# Patient Record
Sex: FEMALE | Race: ASIAN
[De-identification: names, ages, dates, MRNs, and addresses within clinical notes are randomized per-mention and may not be internally consistent; named-entity substitution may affect disease eponyms.]

---

## 2020-04-10 ENCOUNTER — HOSPITAL ENCOUNTER (INPATIENT)
Dept: HOSPITAL 72 - EMR | Age: 65
LOS: 13 days | Discharge: NURSING, REHAB OR LONG-TERM CARE | DRG: 871 | End: 2020-04-23
Payer: COMMERCIAL

## 2020-04-10 VITALS — DIASTOLIC BLOOD PRESSURE: 79 MMHG | SYSTOLIC BLOOD PRESSURE: 125 MMHG

## 2020-04-10 VITALS — DIASTOLIC BLOOD PRESSURE: 71 MMHG | SYSTOLIC BLOOD PRESSURE: 130 MMHG

## 2020-04-10 VITALS — SYSTOLIC BLOOD PRESSURE: 135 MMHG | DIASTOLIC BLOOD PRESSURE: 86 MMHG

## 2020-04-10 VITALS — SYSTOLIC BLOOD PRESSURE: 134 MMHG | DIASTOLIC BLOOD PRESSURE: 57 MMHG

## 2020-04-10 VITALS — WEIGHT: 131.56 LBS | BODY MASS INDEX: 22.46 KG/M2 | HEIGHT: 64 IN

## 2020-04-10 DIAGNOSIS — I62.00: ICD-10-CM

## 2020-04-10 DIAGNOSIS — A41.50: Primary | ICD-10-CM

## 2020-04-10 DIAGNOSIS — E05.80: ICD-10-CM

## 2020-04-10 DIAGNOSIS — R00.0: ICD-10-CM

## 2020-04-10 DIAGNOSIS — I21.3: ICD-10-CM

## 2020-04-10 DIAGNOSIS — I48.91: ICD-10-CM

## 2020-04-10 DIAGNOSIS — E87.6: ICD-10-CM

## 2020-04-10 DIAGNOSIS — E87.0: ICD-10-CM

## 2020-04-10 DIAGNOSIS — E11.10: ICD-10-CM

## 2020-04-10 DIAGNOSIS — E83.39: ICD-10-CM

## 2020-04-10 DIAGNOSIS — L89.156: ICD-10-CM

## 2020-04-10 DIAGNOSIS — A04.72: ICD-10-CM

## 2020-04-10 DIAGNOSIS — Z91.14: ICD-10-CM

## 2020-04-10 DIAGNOSIS — N39.0: ICD-10-CM

## 2020-04-10 DIAGNOSIS — B96.20: ICD-10-CM
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
## 2020-04-10 DIAGNOSIS — R53.1: ICD-10-CM

## 2020-04-10 DIAGNOSIS — R94.31: ICD-10-CM

## 2020-04-10 DIAGNOSIS — E03.9: ICD-10-CM

## 2020-04-10 DIAGNOSIS — I63.81: ICD-10-CM

## 2020-04-10 LAB
ADD MANUAL DIFF: NO
ALBUMIN SERPL-MCNC: 2.3 G/DL (ref 3.4–5)
ALBUMIN/GLOB SERPL: 0.5 {RATIO} (ref 1–2.7)
ALP SERPL-CCNC: 127 U/L (ref 46–116)
ALT SERPL-CCNC: 40 U/L (ref 12–78)
ANION GAP SERPL CALC-SCNC: 26 MMOL/L (ref 5–15)
APPEARANCE UR: (no result)
APTT PPP: (no result) S
AST SERPL-CCNC: 46 U/L (ref 15–37)
BASOPHILS NFR BLD AUTO: 0.8 % (ref 0–2)
BILIRUB SERPL-MCNC: 0.5 MG/DL (ref 0.2–1)
BUN SERPL-MCNC: 17 MG/DL (ref 7–18)
CALCIUM SERPL-MCNC: 9.7 MG/DL (ref 8.5–10.1)
CHLORIDE SERPL-SCNC: 117 MMOL/L (ref 98–107)
CO2 SERPL-SCNC: 11 MMOL/L (ref 21–32)
CREAT SERPL-MCNC: 0.7 MG/DL (ref 0.55–1.3)
EOSINOPHIL NFR BLD AUTO: 0 % (ref 0–3)
ERYTHROCYTE [DISTWIDTH] IN BLOOD BY AUTOMATED COUNT: 13.2 % (ref 11.6–14.8)
GLOBULIN SER-MCNC: 5.1 G/DL
GLUCOSE UR STRIP-MCNC: (no result) MG/DL
HCT VFR BLD CALC: 45.4 % (ref 37–47)
HGB BLD-MCNC: 14.9 G/DL (ref 12–16)
KETONES UR QL STRIP: (no result)
LEUKOCYTE ESTERASE UR QL STRIP: (no result)
LYMPHOCYTES NFR BLD AUTO: 7.3 % (ref 20–45)
MCV RBC AUTO: 94 FL (ref 80–99)
MONOCYTES NFR BLD AUTO: 5.3 % (ref 1–10)
NEUTROPHILS NFR BLD AUTO: 86.5 % (ref 45–75)
NITRITE UR QL STRIP: POSITIVE
PH UR STRIP: 5 [PH] (ref 4.5–8)
PLATELET # BLD: 85 K/UL (ref 150–450)
POTASSIUM SERPL-SCNC: 3.1 MMOL/L (ref 3.5–5.1)
PROT UR QL STRIP: (no result)
RBC # BLD AUTO: 4.85 M/UL (ref 4.2–5.4)
SODIUM SERPL-SCNC: 154 MMOL/L (ref 136–145)
SP GR UR STRIP: 1.01 (ref 1–1.03)
UROBILINOGEN UR-MCNC: NORMAL MG/DL (ref 0–1)
WBC # BLD AUTO: 9.5 K/UL (ref 4.8–10.8)

## 2020-04-10 PROCEDURE — 72141 MRI NECK SPINE W/O DYE: CPT

## 2020-04-10 PROCEDURE — 96366 THER/PROPH/DIAG IV INF ADDON: CPT

## 2020-04-10 PROCEDURE — 83615 LACTATE (LD) (LDH) ENZYME: CPT

## 2020-04-10 PROCEDURE — 76700 US EXAM ABDOM COMPLETE: CPT

## 2020-04-10 PROCEDURE — 83880 ASSAY OF NATRIURETIC PEPTIDE: CPT

## 2020-04-10 PROCEDURE — 83690 ASSAY OF LIPASE: CPT

## 2020-04-10 PROCEDURE — 93005 ELECTROCARDIOGRAM TRACING: CPT

## 2020-04-10 PROCEDURE — 85379 FIBRIN DEGRADATION QUANT: CPT

## 2020-04-10 PROCEDURE — 72148 MRI LUMBAR SPINE W/O DYE: CPT

## 2020-04-10 PROCEDURE — 78306 BONE IMAGING WHOLE BODY: CPT

## 2020-04-10 PROCEDURE — 87040 BLOOD CULTURE FOR BACTERIA: CPT

## 2020-04-10 PROCEDURE — 83735 ASSAY OF MAGNESIUM: CPT

## 2020-04-10 PROCEDURE — 82378 CARCINOEMBRYONIC ANTIGEN: CPT

## 2020-04-10 PROCEDURE — 82009 KETONE BODYS QUAL: CPT

## 2020-04-10 PROCEDURE — 80307 DRUG TEST PRSMV CHEM ANLYZR: CPT

## 2020-04-10 PROCEDURE — 86300 IMMUNOASSAY TUMOR CA 15-3: CPT

## 2020-04-10 PROCEDURE — 82140 ASSAY OF AMMONIA: CPT

## 2020-04-10 PROCEDURE — 96375 TX/PRO/DX INJ NEW DRUG ADDON: CPT

## 2020-04-10 PROCEDURE — 82784 ASSAY IGA/IGD/IGG/IGM EACH: CPT

## 2020-04-10 PROCEDURE — 87324 CLOSTRIDIUM AG IA: CPT

## 2020-04-10 PROCEDURE — 80048 BASIC METABOLIC PNL TOTAL CA: CPT

## 2020-04-10 PROCEDURE — 99291 CRITICAL CARE FIRST HOUR: CPT

## 2020-04-10 PROCEDURE — 36415 COLL VENOUS BLD VENIPUNCTURE: CPT

## 2020-04-10 PROCEDURE — 82607 VITAMIN B-12: CPT

## 2020-04-10 PROCEDURE — 96361 HYDRATE IV INFUSION ADD-ON: CPT

## 2020-04-10 PROCEDURE — 83036 HEMOGLOBIN GLYCOSYLATED A1C: CPT

## 2020-04-10 PROCEDURE — 82962 GLUCOSE BLOOD TEST: CPT

## 2020-04-10 PROCEDURE — 85025 COMPLETE CBC W/AUTO DIFF WBC: CPT

## 2020-04-10 PROCEDURE — 87086 URINE CULTURE/COLONY COUNT: CPT

## 2020-04-10 PROCEDURE — 84550 ASSAY OF BLOOD/URIC ACID: CPT

## 2020-04-10 PROCEDURE — 84100 ASSAY OF PHOSPHORUS: CPT

## 2020-04-10 PROCEDURE — 82977 ASSAY OF GGT: CPT

## 2020-04-10 PROCEDURE — 70553 MRI BRAIN STEM W/O & W/DYE: CPT

## 2020-04-10 PROCEDURE — 84484 ASSAY OF TROPONIN QUANT: CPT

## 2020-04-10 PROCEDURE — 71275 CT ANGIOGRAPHY CHEST: CPT

## 2020-04-10 PROCEDURE — 84165 PROTEIN E-PHORESIS SERUM: CPT

## 2020-04-10 PROCEDURE — 84300 ASSAY OF URINE SODIUM: CPT

## 2020-04-10 PROCEDURE — 84439 ASSAY OF FREE THYROXINE: CPT

## 2020-04-10 PROCEDURE — 94003 VENT MGMT INPAT SUBQ DAY: CPT

## 2020-04-10 PROCEDURE — 80202 ASSAY OF VANCOMYCIN: CPT

## 2020-04-10 PROCEDURE — 74177 CT ABD & PELVIS W/CONTRAST: CPT

## 2020-04-10 PROCEDURE — 94150 VITAL CAPACITY TEST: CPT

## 2020-04-10 PROCEDURE — 87181 SC STD AGAR DILUTION PER AGT: CPT

## 2020-04-10 PROCEDURE — 96365 THER/PROPH/DIAG IV INF INIT: CPT

## 2020-04-10 PROCEDURE — 80061 LIPID PANEL: CPT

## 2020-04-10 PROCEDURE — 72157 MRI CHEST SPINE W/O & W/DYE: CPT

## 2020-04-10 PROCEDURE — 86140 C-REACTIVE PROTEIN: CPT

## 2020-04-10 PROCEDURE — 82746 ASSAY OF FOLIC ACID SERUM: CPT

## 2020-04-10 PROCEDURE — 80299 QUANTITATIVE ASSAY DRUG: CPT

## 2020-04-10 PROCEDURE — 80053 COMPREHEN METABOLIC PANEL: CPT

## 2020-04-10 PROCEDURE — 71045 X-RAY EXAM CHEST 1 VIEW: CPT

## 2020-04-10 PROCEDURE — 71260 CT THORAX DX C+: CPT

## 2020-04-10 PROCEDURE — 81003 URINALYSIS AUTO W/O SCOPE: CPT

## 2020-04-10 PROCEDURE — 84443 ASSAY THYROID STIM HORMONE: CPT

## 2020-04-10 PROCEDURE — 85007 BL SMEAR W/DIFF WBC COUNT: CPT

## 2020-04-10 PROCEDURE — 70450 CT HEAD/BRAIN W/O DYE: CPT

## 2020-04-10 RX ADMIN — SODIUM CHLORIDE AND POTASSIUM CHLORIDE SCH MLS/HR: 4.5; 1.49 INJECTION, SOLUTION INTRAVENOUS at 23:10

## 2020-04-10 NOTE — NUR
ED Nurse Note:

IV est by SONU GARCIA aware since arrival that pt is hard stick and haven't est IV 
until 1725.

## 2020-04-10 NOTE — NUR
ED Nurse Note:



blood culture sent to lab; lab called for blood draw d/t 3 failed attempts per 
RNs.

## 2020-04-10 NOTE — EMERGENCY ROOM REPORT
History of Present Illness


General


Chief Complaint:  Abnormal Labs


Source:  Patient, Medical Record





Present Illness


HPI


Disclaimer: Please note that this report is being documented using [employer] 

technology. This can lead to erroneous entry secondary to incorrect 

interpretation by the dictating instrument.





HPI: 65-year-old female with history of diabetes on insulin presents for 

weakness and elevated blood sugar levels.  She arrives from home by EMS with 

glucose readings greater than 500 on fingerstick per EMS.  She has had 5 days 

of vomiting and diarrhea.  Unable tolerate food, water or her medications.  

Denies fever or chills.  Denies cough or chest pain.  Feels profoundly weak.  

Denies dysuria hematuria.


 


PMH: Insulin-dependent diabetes


 


PSH: Reviewed


 


Allergies: None reported


 


Social Hx: None reported


Allergies:  


Coded Allergies:  


     No Known Allergies (Unverified , 4/15/15)





COVID-19 Screening


Contact w/high risk pt:  No


Recent Travel to affected area:  No


Experienced COVID-19 symptoms?:  No





Nursing Documentation-PMH


Hx Cardiac Problems:  Yes


Hx Diabetes:  Yes





Review of Systems


All Other Systems:  negative except mentioned in HPI





Physical Exam





Vital Signs








  Date Time  Temp Pulse Resp B/P (MAP) Pulse Ox O2 Delivery O2 Flow Rate FiO2


 


4/10/20 15:28 99.1 104 20 136/78 (97) 98 Room Air  





 





General: Awake and somnolent, no acute distress, appears fatigued


HEENT: NC/AT. EOMI. dry mucous membranes


Cardiovascular: RRR.  S1 and S2 normal.  No murmur appreciated


Resp: Normal work of breathing. No cough, wheezing or crackles appreciated


Abdomen: Abdomen is soft, nondistended.  Nontender


Skin: Intact.  No abrasions, laceration or rash over the exposed skin


MSK: Normal tone and bulk. Moving all extremities.  No obvious deformity.


Neuro: Awake and alert.  Mentating appropriately.





Procedures


Critical Care Time


Critical Care Time


Total critical care time: Approximately 31 minutes





Due to a high probability of clinically significant, life threatening 

deterioration, the patient required the highest level of preparedness to 

intervene emergently and I personally spent this critical care time directly 

and personally managing the patient. This critical care time included obtaining 

a history, examining the patient, pulse oximetry, ordering and reviewing studies

, ordering treatments, evaluating response to treatment and updating management 

plan as needed, frequent reassessment and discussion with other providers as 

well as arranging for ultimate disposition.  This critical to care time was 

performed to assess and manage the high probability of life-threatening 

deterioration that could result in multiorgan failure.  This critical care time 

is separate from the separately billable procedures and treating other patients.





Medical Decision Making


Diagnostic Impression:  


 Primary Impression:  


 DKA (diabetic ketoacidoses)


 Additional Impressions:  


 Hypernatremia


 Prolonged Q-T interval on ECG


 UTI (urinary tract infection)


ER Course


65-year-old female with history of diabetes presents for evaluation of fatigue 

in the setting of recent vomiting and diarrheal illness.  Blood sugars found 

elevated prior to arrival.  Concern for DKA, HHS, electrolyte abnormality, 

dehydration, occult infection at this time.  Broad labs were sent and the 

patient was started on IV fluids and empirically treated with insulin.  She now 

shows a hypernatremia, hypokalemia and elevated anion gap acidosis with 

positive ketones consistent with diabetic ketoacidosis.  Will replete potassium 

and start IV insulin drip.  Patient require admission to intensive care.





Laboratory Tests








Test


  4/10/20


17:21 4/10/20


20:40


 


White Blood Count


  9.5 K/UL


(4.8-10.8) 


 


 


Red Blood Count


  4.85 M/UL


(4.20-5.40) 


 


 


Hemoglobin


  14.9 G/DL


(12.0-16.0) 


 


 


Hematocrit


  45.4 %


(37.0-47.0) 


 


 


Mean Corpuscular Volume 94 FL (80-99)   


 


Mean Corpuscular Hemoglobin


  30.7 PG


(27.0-31.0) 


 


 


Mean Corpuscular Hemoglobin


Concent 32.7 G/DL


(32.0-36.0) 


 


 


Red Cell Distribution Width


  13.2 %


(11.6-14.8) 


 


 


Platelet Count


  85 K/UL


(150-450)  L 


 


 


Mean Platelet Volume


  8.2 FL


(6.5-10.1) 


 


 


Neutrophils (%) (Auto)


  86.5 %


(45.0-75.0)  H 


 


 


Lymphocytes (%) (Auto)


  7.3 %


(20.0-45.0)  L 


 


 


Monocytes (%) (Auto)


  5.3 %


(1.0-10.0) 


 


 


Eosinophils (%) (Auto)


  0.0 %


(0.0-3.0) 


 


 


Basophils (%) (Auto)


  0.8 %


(0.0-2.0) 


 


 


Sodium Level


  154 MMOL/L


(136-145)  H 


 


 


Potassium Level


  3.1 MMOL/L


(3.5-5.1)  L 


 


 


Chloride Level


  117 MMOL/L


()  H 


 


 


Carbon Dioxide Level


  11 MMOL/L


(21-32)  L 


 


 


Anion Gap


  26 mmol/L


(5-15)  H 


 


 


Blood Urea Nitrogen


  17 mg/dL


(7-18) 


 


 


Creatinine


  0.7 MG/DL


(0.55-1.30) 


 


 


Estimated Glomerular


Filtration Rate > 60 mL/min


(>60) 


 


 


Glucose Level


  423 MG/DL


()  H 


 


 


Calcium Level


  9.7 MG/DL


(8.5-10.1) 


 


 


Magnesium Level


  2.5 MG/DL


(1.8-2.4)  H 


 


 


Total Bilirubin


  0.5 MG/DL


(0.2-1.0) 


 


 


Aspartate Amino Transferase


(AST) 46 U/L (15-37)


H 


 


 


Alanine Aminotransferase (ALT)


  40 U/L (12-78)


  


 


 


Alkaline Phosphatase


  127 U/L


()  H 


 


 


Total Protein


  7.4 G/DL


(6.4-8.2) 


 


 


Albumin


  2.3 G/DL


(3.4-5.0)  L 


 


 


Globulin 5.1 g/dL   


 


Albumin/Globulin Ratio


  0.5 (1.0-2.7)


L 


 


 


Lipase


  70 U/L


()  L 


 


 


Acetone Level


  Positive-moderate


(NEGATIVE) 


 


 


Urine Color  Pale yellow  


 


Urine Appearance


  


  Slightly


cloudy


 


Urine pH  5 (4.5-8.0)  


 


Urine Specific Gravity


  


  1.010


(1.005-1.035)


 


Urine Protein


  


  3+ (NEGATIVE)


H


 


Urine Glucose (UA)


  


  4+ (NEGATIVE)


H


 


Urine Ketones


  


  4+ (NEGATIVE)


H


 


Urine Blood


  


  4+ (NEGATIVE)


H


 


Urine Nitrite


  


  Positive


(NEGATIVE)  H


 


Urine Bilirubin


  


  Negative


(NEGATIVE)


 


Urine Urobilinogen


  


  Normal MG/DL


(0.0-1.0)


 


Urine Leukocyte Esterase


  


  2+ (NEGATIVE)


H


 


Urine RBC


  


  0-2 /HPF (0 -


2)


 


Urine WBC


  


  Tntc /HPF (0 -


2)  H


 


Urine Squamous Epithelial


Cells 


  None /LPF


(NONE/OCC)


 


Urine Bacteria


  


  Many /HPF


(NONE)  H








EKG Diagnostic Results


EKG Time:  16:39


Rate:  normal


Rhythm:  NSR


ST Segments:  no acute changes


Other Impression


Sinus rhythm, prolonged QTC at 679 ms, flattened T waves, anterior Q waves.  No 

ST segment changes





Rhythm Strip Diag. Results


Rhythm Strip Time:  16:39


EP Interpretation:  yes


Rate:  100


Rhythm:  NSR, no PVC's, no ectopy





CT/MRI/US Diagnostic Results


CT/MRI/US Diagnostic Results :  


   Impression


Final Report


EXAM:


CT Abdomen and Pelvis With Intravenous Contrast





CLINICAL HISTORY:


ABD DIST





TECHNIQUE:


Axial computed tomography images of the abdomen and pelvis with intravenous 

contrast. CTDI is 3.6 mGy and DLP is 193.3 mGy-cm. One or more of the following 

dose reduction techniques were used: automated exposure control, adjustment of 

the mA and/or kV according to patient size, use of iterative reconstruction 

technique.





COMPARISON:


No relevant prior studies available.





FINDINGS:


Artifacts: Motion.


Lung bases: No significant abnormality.





ABDOMEN:


Liver: No significant abnormality.


Gallbladder and bile ducts: No significant abnormality. No calcified stones.


Pancreas: No significant abnormality.


Spleen: No significant abnormality.


Adrenals: No significant abnormality.


Kidneys and ureters: Mild bilateral renal pelviectasis and prominence of 

bilateral ureters is likely related to distention of the urinary bladder. The 

lower pole of the left kidney is mildly heterogeneous.


Stomach and bowel: No significant abnormality. Bowel is nondilated.





PELVIS:


Appendix: No findings to suggest acute appendicitis.


Bladder: The distended urinary bladder appears thin-walled.


Reproductive: Unremarkable as visualized.





ABDOMEN and PELVIS:


Intraperitoneal space: No significant abnormality. No free air.


Bones/joints: No acute fracture or malalignment.


Soft tissues: No significant abnormality.


Vasculature: No significant abnormality. No abdominal aortic aneurysm.


Lymph nodes: No significant abnormality.








IMPRESSION:


1. Urinary bladder distention.


2. Mild heterogeneity of the lower pole of the left kidney may be artifactual. 

Infection is not excluded. Consider correlation with urinalysis, as clinically 

indicated. Mild bilateral renal pelviectasis and prominence of bilateral 

ureters is likely related to distention of the urinary bladder.





Radiologist: April Tony MD   Electronically Signed: 04/10/20 20:36   

Phone: [phone]


Study ready at 20:21 and initial results transmitted at 20:36


Reevaluation Time:  21:19





Last Vital Signs








  Date Time  Temp Pulse Resp B/P (MAP) Pulse Ox O2 Delivery O2 Flow Rate FiO2


 


4/10/20 15:28 99.1 104 20 136/78 (97) 98 Room Air  








Reevaluation Impression


Patient proving on insulin drip.  Urinalysis consistent with an acute urinary 

tract infection.  A CT of the abdomen was obtained showing some heterogeneity 

of the lower kidney possibly infectious etiology consistent with a 

pyelonephritis.  Will treat with ceftriaxone.  Patient will be admitted to 

panel physician group.  Admit to the stepdown unit.


Disposition:  ADMITTED AS INPATIENT


Condition:  Serious











Darryl Galloway MD Apr 10, 2020 15:39

## 2020-04-10 NOTE — DIAGNOSTIC IMAGING REPORT
EXAM:

  CT Abdomen and Pelvis With Intravenous Contrast

 

CLINICAL HISTORY:

  ABD DIST

 

TECHNIQUE:

  Axial computed tomography images of the abdomen and pelvis with 

intravenous contrast.  CTDI is 3.6 mGy and DLP is 193.3 mGy-cm.  One or 

more of the following dose reduction techniques were used: automated 

exposure control, adjustment of the mA and/or kV according to patient 

size, use of iterative reconstruction technique.

 

COMPARISON:

  No relevant prior studies available.

 

FINDINGS:

  Artifacts:  Motion.

  Lung bases:  No significant abnormality.

 

 ABDOMEN:

  Liver:  No significant abnormality.

  Gallbladder and bile ducts:  No significant abnormality.  No calcified 

stones.

  Pancreas:  No significant abnormality.

  Spleen:  No significant abnormality.

  Adrenals:  No significant abnormality.

  Kidneys and ureters:  Mild bilateral renal pelviectasis and prominence 

of bilateral ureters is likely related to distention of the urinary 

bladder. The lower pole of the left kidney is mildly heterogeneous.

  Stomach and bowel:  No significant abnormality.  Bowel is nondilated.

 

 PELVIS:

  Appendix:  No findings to suggest acute appendicitis.

  Bladder:  The distended urinary bladder appears thin-walled.

  Reproductive:  Unremarkable as visualized.

 

 ABDOMEN and PELVIS:

  Intraperitoneal space:  No significant abnormality.  No free air.

  Bones/joints:  No acute fracture or malalignment.

  Soft tissues:  No significant abnormality.

  Vasculature:  No significant abnormality.  No abdominal aortic aneurysm.

 

  Lymph nodes:  No significant abnormality.

  

 

IMPRESSION:     

1.  Urinary bladder distention.

2.  Mild heterogeneity of the lower pole of the left kidney may be 

artifactual.  Infection is not excluded.  Consider correlation with 

urinalysis, as clinically indicated.  Mild bilateral renal pelviectasis 

and prominence of bilateral ureters is likely related to distention of 

the urinary bladder.

## 2020-04-10 NOTE — NUR
ED Nurse Note:

Dr Galloway notified that KCl has to be through central line. Per MD, don't give 
KCL, but give Potassium phosp first and then novolog.

## 2020-04-10 NOTE — NUR
ED Nurse Note:



pt yelling out in pain, Pt states pain is RLQ and radiates to right flank. ERMD 
aware. ERMD at bedside

## 2020-04-11 VITALS — DIASTOLIC BLOOD PRESSURE: 62 MMHG | SYSTOLIC BLOOD PRESSURE: 104 MMHG

## 2020-04-11 VITALS — DIASTOLIC BLOOD PRESSURE: 67 MMHG | SYSTOLIC BLOOD PRESSURE: 135 MMHG

## 2020-04-11 VITALS — DIASTOLIC BLOOD PRESSURE: 65 MMHG | SYSTOLIC BLOOD PRESSURE: 134 MMHG

## 2020-04-11 LAB
ADD MANUAL DIFF: YES
ADD MANUAL DIFF: YES
ANION GAP SERPL CALC-SCNC: 17 MMOL/L (ref 5–15)
ANION GAP SERPL CALC-SCNC: 21 MMOL/L (ref 5–15)
ANION GAP SERPL CALC-SCNC: 21 MMOL/L (ref 5–15)
ANION GAP SERPL CALC-SCNC: 24 MMOL/L (ref 5–15)
BUN SERPL-MCNC: 13 MG/DL (ref 7–18)
BUN SERPL-MCNC: 14 MG/DL (ref 7–18)
CALCIUM SERPL-MCNC: 9 MG/DL (ref 8.5–10.1)
CALCIUM SERPL-MCNC: 9.1 MG/DL (ref 8.5–10.1)
CALCIUM SERPL-MCNC: 9.1 MG/DL (ref 8.5–10.1)
CALCIUM SERPL-MCNC: 9.3 MG/DL (ref 8.5–10.1)
CHLORIDE SERPL-SCNC: 121 MMOL/L (ref 98–107)
CHLORIDE SERPL-SCNC: 122 MMOL/L (ref 98–107)
CHLORIDE SERPL-SCNC: 123 MMOL/L (ref 98–107)
CHLORIDE SERPL-SCNC: 128 MMOL/L (ref 98–107)
CO2 SERPL-SCNC: 14 MMOL/L (ref 21–32)
CO2 SERPL-SCNC: 15 MMOL/L (ref 21–32)
CO2 SERPL-SCNC: 17 MMOL/L (ref 21–32)
CO2 SERPL-SCNC: 19 MMOL/L (ref 21–32)
CREAT SERPL-MCNC: 0.5 MG/DL (ref 0.55–1.3)
CREAT SERPL-MCNC: 0.6 MG/DL (ref 0.55–1.3)
CREAT SERPL-MCNC: 0.7 MG/DL (ref 0.55–1.3)
CREAT SERPL-MCNC: 0.7 MG/DL (ref 0.55–1.3)
ERYTHROCYTE [DISTWIDTH] IN BLOOD BY AUTOMATED COUNT: 11.7 % (ref 11.6–14.8)
ERYTHROCYTE [DISTWIDTH] IN BLOOD BY AUTOMATED COUNT: 12.1 % (ref 11.6–14.8)
HCT VFR BLD CALC: 42.2 % (ref 37–47)
HCT VFR BLD CALC: 46.6 % (ref 37–47)
HGB BLD-MCNC: 15 G/DL (ref 12–16)
HGB BLD-MCNC: 16.3 G/DL (ref 12–16)
MCV RBC AUTO: 88 FL (ref 80–99)
MCV RBC AUTO: 89 FL (ref 80–99)
PLATELET # BLD: 78 K/UL (ref 150–450)
PLATELET # BLD: 82 K/UL (ref 150–450)
POTASSIUM SERPL-SCNC: 2.1 MMOL/L (ref 3.5–5.1)
POTASSIUM SERPL-SCNC: 2.6 MMOL/L (ref 3.5–5.1)
POTASSIUM SERPL-SCNC: 3.1 MMOL/L (ref 3.5–5.1)
POTASSIUM SERPL-SCNC: 3.9 MMOL/L (ref 3.5–5.1)
RBC # BLD AUTO: 4.82 M/UL (ref 4.2–5.4)
RBC # BLD AUTO: 5.24 M/UL (ref 4.2–5.4)
SODIUM SERPL-SCNC: 160 MMOL/L (ref 136–145)
SODIUM SERPL-SCNC: 160 MMOL/L (ref 136–145)
SODIUM SERPL-SCNC: 161 MMOL/L (ref 136–145)
SODIUM SERPL-SCNC: 162 MMOL/L (ref 136–145)
WBC # BLD AUTO: 10.2 K/UL (ref 4.8–10.8)
WBC # BLD AUTO: 8.4 K/UL (ref 4.8–10.8)

## 2020-04-11 RX ADMIN — SODIUM CHLORIDE SCH MLS/HR: 900 INJECTION, SOLUTION INTRAVENOUS at 18:32

## 2020-04-11 RX ADMIN — INSULIN ASPART SCH UNITS: 100 INJECTION, SOLUTION INTRAVENOUS; SUBCUTANEOUS at 12:00

## 2020-04-11 RX ADMIN — PANTOPRAZOLE SODIUM SCH MG: 40 INJECTION, POWDER, FOR SOLUTION INTRAVENOUS at 12:52

## 2020-04-11 RX ADMIN — SODIUM CHLORIDE AND POTASSIUM CHLORIDE SCH MLS/HR: 4.5; 1.49 INJECTION, SOLUTION INTRAVENOUS at 05:15

## 2020-04-11 RX ADMIN — DIBASIC SODIUM PHOSPHATE, MONOBASIC POTASSIUM PHOSPHATE AND MONOBASIC SODIUM PHOSPHATE SCH MG: 852; 155; 130 TABLET ORAL at 12:51

## 2020-04-11 RX ADMIN — PANTOPRAZOLE SODIUM SCH MG: 40 INJECTION, POWDER, FOR SOLUTION INTRAVENOUS at 22:17

## 2020-04-11 RX ADMIN — SODIUM CITRATE AND CITRIC ACID MONOHYDRATE SCH ML: 500; 334 SOLUTION ORAL at 17:27

## 2020-04-11 RX ADMIN — DIBASIC SODIUM PHOSPHATE, MONOBASIC POTASSIUM PHOSPHATE AND MONOBASIC SODIUM PHOSPHATE SCH MG: 852; 155; 130 TABLET ORAL at 17:27

## 2020-04-11 RX ADMIN — METOPROLOL TARTRATE SCH MG: 25 TABLET, FILM COATED ORAL at 21:00

## 2020-04-11 RX ADMIN — LEVETIRACETAM SCH MLS/HR: 5 INJECTION INTRAVENOUS at 12:28

## 2020-04-11 RX ADMIN — INSULIN ASPART SCH UNITS: 100 INJECTION, SOLUTION INTRAVENOUS; SUBCUTANEOUS at 17:37

## 2020-04-11 RX ADMIN — LEVETIRACETAM SCH MLS/HR: 5 INJECTION INTRAVENOUS at 22:17

## 2020-04-11 RX ADMIN — INSULIN ASPART SCH UNITS: 100 INJECTION, SOLUTION INTRAVENOUS; SUBCUTANEOUS at 09:49

## 2020-04-11 RX ADMIN — SODIUM CITRATE AND CITRIC ACID MONOHYDRATE SCH ML: 500; 334 SOLUTION ORAL at 12:51

## 2020-04-11 RX ADMIN — SODIUM CHLORIDE SCH MLS/HR: 900 INJECTION, SOLUTION INTRAVENOUS at 11:44

## 2020-04-11 NOTE — DIAGNOSTIC IMAGING REPORT
EXAM:

  CT Head Without Intravenous Contrast

 

CLINICAL HISTORY:

  SUBDURAL

 

TECHNIQUE:

  Axial computed tomography images of the head/brain without intravenous 

contrast.  CTDI is 53.4 mGy and DLP is 992.1 mGy-cm.  One or more of the 

following dose reduction techniques were used: automated exposure control,

 adjustment of the mA and/or kV according to patient size, use of 

iterative reconstruction technique.

 

COMPARISON:

  CT head, 4/11/20 850

 

FINDINGS:

  Brain:  Stable small subdural hemorrhage along the left falx and left 

tentorium.  No new bleed.  Stable bilateral frontal extra-axial CSF 

density likely due to atrophy.  No edema.  No mass effect or midline 

shift.

  Ventricles:  Unremarkable.  No ventriculomegaly.

  Bones/joints:  Unremarkable.  No acute fracture.

  Soft tissues:  Unremarkable.

  Sinuses:  Unremarkable as visualized.  No acute sinusitis.

  Mastoid air cells:  Unremarkable as visualized.  No mastoid effusion.

 

IMPRESSION:     

  Stable small subdural hemorrhage along the left falx and left tentorium.

  No new bleed.  No significant interval change.

## 2020-04-11 NOTE — HISTORY AND PHYSICAL
History of Present Illness


General


Reason for Hospitalization:  Abnormal Labs





Present Illness


HPI


65-year-old female with PMH of medical noncompliance, DM type II who presents 

with b/l weakness, N/V, GLF, on admission pt found to be in DKA with 's.  

Patient is Nepali speaking, history obtained via  at bedside.  

Patient noted 5 days ago b/l LE weakness, stated she fell 3 days ago and hit 

her head. She did not go to the ER at the time and attempted to treat her HA 

with acupuncture.  Patient noted throughout this time she had multiple episodes 

of N/V x4 days, denies any F/C, changes in dietary habit, no constipation/

diarrhea. Patient states she was diagnosed with diabetes by her PCP however 

refused to take any medications as she did not believe the diagnosis was 

correct.  Patient is admitted for further treatment and evaluation.





PMH: Hypothyroidism, DMT2


FH: reviewed and not pertinent


SH: lives w/a roomate, no ETOH/tobacco


Sx: denies


Allergies:  


Coded Allergies:  


     No Known Allergies (Unverified , 4/15/15)





COVID-19 Screening


Contact w/high risk pt:  No


Recent Travel to affected area:  No


Experienced COVID-19 symptoms?:  No





Medication History


Scheduled


No Known Medications* (NKM - No Known Medications*), 0 ., (Reported)





Patient History


Healthcare decision maker





Resuscitation status


Full Code


Advanced Directive on File


No





Review of Systems


Constitutional:  Denies: no symptoms, see HPI, chills, sweats, fever, malaise, 

weakness, other


Eye:  Denies: no symptoms, see HPI, eye pain, blurred vision, tearing, double 

vision, nose pain, nose congestion, acuity changes, discharge, other


ENT:  Denies: no symptoms, see HPI, ear pain, ear discharge, nose pain, nose 

congestion, throat pain, throat swelling, mouth pain, hearing loss, nasal 

discharge, other


Respiratory:  Denies: no symptoms, see HPI, cough, orthopnea, shortness of 

breath, stridor, wheezing, HOFFMAN, sputum, other


Cardiovascular:  Denies: no symptoms, see HPI, chest pain, edema, palpitations, 

syncope, PND, other


Gastrointestinal:  Denies: no symptoms, see HPI, abdominal pain, constipation, 

diarrhea, nausea, vomiting, melena, hematemesis, other


Genitourinary:  Denies: no symptoms, see HPI, discharge, dysuria, frequency, 

hematuria, pain, retention, incontinence, urgency, vag bleed/dc, other


Musculoskeletal:  Denies: no symptoms, see HPI, back pain, gout, joint pain, 

joint swelling, muscle pain, muscle stiffness, other


Skin:  Denies: no symptoms, see HPI, rash, change in color, change in hair/nails

, dryness, lesions, other


Psychiatric:  Denies: no symptoms, see HPI, prior hx, anxiety, depressed 

feelings, emotional problems, SI, HI, hallucinations, other


Neurological:  Reports: focal weakness


Endocrine:  Denies: no symptoms, see HPI, excessive sweating, flushing, 

intolerance to temperature, increased thirst, increased urine, unexplained 

weight loss, other





Physical Exam


Physical Exam Narrative


General: NAD, A&O x 3


HEENT: NCAT, EOMi, PEERLA, mucous membranes moist


CV: RRR, no murmurs, rubs, or gallops


Pulm: CTAB, No wheezes, rhonchi, or rales, no accessory muscle usage or 

conversational dyspnea


GI: Soft, nontender, nondistended, bowel sounds present


Neuro: CN 2-12 grossly intact bilaterally, no focal signs. Sensation in tact b/l


Ext: No lower extremity edema bilaterally


Skin: no rashes lesions or ulcers


Msk: Joints symmetrical in upper extremity and lower extremity bilaterally, no 

joint swelling. MS 5/5 in UE b/l, 3/5 in LE b/l





Last 24 Hour Vital Signs








  Date Time  Temp Pulse Resp B/P (MAP) Pulse Ox O2 Delivery O2 Flow Rate FiO2


 


4/11/20 04:15  100      


 


4/11/20 04:00      Room Air  


 


4/11/20 03:40 98.6 102 26 134/65 97 Room Air  


 


4/11/20 03:20 98.6 102 26 134/65 97 Room Air  


 


4/11/20 01:30 98.6 108 26 135/67 97 Room Air  


 


4/10/20 23:57 98.6 102 16 134/57 97 Room Air  


 


4/10/20 23:06 99.1       


 


4/10/20 21:35 98.7 97 18 135/86 99 Room Air  


 


4/10/20 20:47 99.1       


 


4/10/20 19:30 99.1 85 18 125/79 99 Room Air  


 


4/10/20 17:50 99.1 90 18 130/71 99 Room Air  


 


4/10/20 15:28 99.1 104 20 136/78 (97) 98 Room Air  

















Intake and Output  


 


 4/10/20 4/11/20





 19:00 07:00


 


Intake Total 0 ml 100 ml


 


Output Total  550 ml


 


Balance 0 ml -450 ml


 


  


 


Intake Oral 0 ml 100 ml


 


Output Urine Total  550 ml











Laboratory Tests








Test


  4/10/20


17:21 4/10/20


20:40 4/11/20


01:45 4/11/20


06:10


 


White Blood Count


  9.5 K/UL


(4.8-10.8) 


  8.4 K/UL


(4.8-10.8) 


 


 


Red Blood Count


  4.85 M/UL


(4.20-5.40) 


  4.82 M/UL


(4.20-5.40) 


 


 


Hemoglobin


  14.9 G/DL


(12.0-16.0) 


  15.0 G/DL


(12.0-16.0) 


 


 


Hematocrit


  45.4 %


(37.0-47.0) 


  42.2 %


(37.0-47.0) 


 


 


Mean Corpuscular Volume 94 FL (80-99)    88 FL (80-99)   


 


Mean Corpuscular Hemoglobin


  30.7 PG


(27.0-31.0) 


  31.1 PG


(27.0-31.0)  H 


 


 


Mean Corpuscular Hemoglobin


Concent 32.7 G/DL


(32.0-36.0) 


  35.6 G/DL


(32.0-36.0) 


 


 


Red Cell Distribution Width


  13.2 %


(11.6-14.8) 


  11.7 %


(11.6-14.8) 


 


 


Platelet Count


  85 K/UL


(150-450)  L 


  82 K/UL


(150-450)  L 


 


 


Mean Platelet Volume


  8.2 FL


(6.5-10.1) 


  8.2 FL


(6.5-10.1) 


 


 


Neutrophils (%) (Auto)


  86.5 %


(45.0-75.0)  H 


  % (45.0-75.0)


  


 


 


Lymphocytes (%) (Auto)


  7.3 %


(20.0-45.0)  L 


  % (20.0-45.0)


  


 


 


Monocytes (%) (Auto)


  5.3 %


(1.0-10.0) 


   % (1.0-10.0)  


  


 


 


Eosinophils (%) (Auto)


  0.0 %


(0.0-3.0) 


   % (0.0-3.0)  


  


 


 


Basophils (%) (Auto)


  0.8 %


(0.0-2.0) 


   % (0.0-2.0)  


  


 


 


Sodium Level


  154 MMOL/L


(136-145)  H 


  160 MMOL/L


(136-145)  H 160 MMOL/L


(136-145)  H


 


Potassium Level


  3.1 MMOL/L


(3.5-5.1)  L 


  2.1 MMOL/L


(3.5-5.1)  *L 2.6 MMOL/L


(3.5-5.1)  *L


 


Chloride Level


  117 MMOL/L


()  H 


  121 MMOL/L


()  H 123 MMOL/L


()  H


 


Carbon Dioxide Level


  11 MMOL/L


(21-32)  L 


  19 MMOL/L


(21-32)  L 15 MMOL/L


(21-32)  L


 


Anion Gap


  26 mmol/L


(5-15)  H 


  21 mmol/L


(5-15)  H 21 mmol/L


(5-15)  H


 


Blood Urea Nitrogen


  17 mg/dL


(7-18) 


  14 mg/dL


(7-18) 14 mg/dL


(7-18)


 


Creatinine


  0.7 MG/DL


(0.55-1.30) 


  0.7 MG/DL


(0.55-1.30) 0.6 MG/DL


(0.55-1.30)


 


Estimat Glomerular Filtration


Rate > 60 mL/min


(>60) 


  > 60 mL/min


(>60) > 60 mL/min


(>60)


 


Glucose Level


  423 MG/DL


()  H 


  270 MG/DL


()  #H 315 MG/DL


()  H


 


Calcium Level


  9.7 MG/DL


(8.5-10.1) 


  9.1 MG/DL


(8.5-10.1) 9.0 MG/DL


(8.5-10.1)


 


Magnesium Level


  2.5 MG/DL


(1.8-2.4)  H 


  


  


 


 


Total Bilirubin


  0.5 MG/DL


(0.2-1.0) 


  


  


 


 


Aspartate Amino Transf


(AST/SGOT) 46 U/L (15-37)


H 


  


  


 


 


Alanine Aminotransferase


(ALT/SGPT) 40 U/L (12-78)


  


  


  


 


 


Alkaline Phosphatase


  127 U/L


()  H 


  


  


 


 


Total Protein


  7.4 G/DL


(6.4-8.2) 


  


  


 


 


Albumin


  2.3 G/DL


(3.4-5.0)  L 


  


  


 


 


Globulin 5.1 g/dL     


 


Albumin/Globulin Ratio


  0.5 (1.0-2.7)


L 


  


  


 


 


Lipase


  70 U/L


()  L 


  


  


 


 


Acetone Level


  Positive-moderate


(NEGATIVE) 


  


  


 


 


Urine Color  Pale yellow    


 


Urine Appearance


  


  Slightly


cloudy 


  


 


 


Urine pH  5 (4.5-8.0)    


 


Urine Specific Gravity


  


  1.010


(1.005-1.035) 


  


 


 


Urine Protein


  


  3+ (NEGATIVE)


H 


  


 


 


Urine Glucose (UA)


  


  4+ (NEGATIVE)


H 


  


 


 


Urine Ketones


  


  4+ (NEGATIVE)


H 


  


 


 


Urine Blood


  


  4+ (NEGATIVE)


H 


  


 


 


Urine Nitrite


  


  Positive


(NEGATIVE)  H 


  


 


 


Urine Bilirubin


  


  Negative


(NEGATIVE) 


  


 


 


Urine Urobilinogen


  


  Normal MG/DL


(0.0-1.0) 


  


 


 


Urine Leukocyte Esterase


  


  2+ (NEGATIVE)


H 


  


 


 


Urine RBC


  


  0-2 /HPF (0 -


2) 


  


 


 


Urine WBC


  


  Tntc /HPF (0 -


2)  H 


  


 


 


Urine Squamous Epithelial


Cells 


  None /LPF


(NONE/OCC) 


  


 


 


Urine Bacteria


  


  Many /HPF


(NONE)  H 


  


 


 


Differential Total Cells


Counted 


  


  100  


  


 


 


Neutrophils % (Manual)   82 % (45-75)  H 


 


Lymphocytes % (Manual)   5 % (20-45)  L 


 


Monocytes % (Manual)   3 % (1-10)   


 


Eosinophils % (Manual)   0 % (0-3)   


 


Basophils % (Manual)   0 % (0-2)   


 


Band Neutrophils   10 % (0-8)  H 


 


Platelet Estimate   Decreased  L 


 


Platelet Morphology   Normal   


 


Hemoglobin A1c


  


  


  10.4 %


(4.3-6.0)  H 


 


 


D-Dimer


  


  


  


  6.44 mg/L FEU


(0.00-0.49)  H


 


Phosphorus Level


  


  


  


  1.4 MG/DL


(2.5-4.9)  L








Height (Feet):  5


Height (Inches):  4.00


Weight (Pounds):  110


Medications





Current Medications








 Medications


  (Trade)  Dose


 Ordered  Sig/Meghan


 Route


 PRN Reason  Start Time


 Stop Time Status Last Admin


Dose Admin


 


 Ceftriaxone


 Sodium 1 gm/


 Sodium Chloride  55 ml @ 


 110 mls/hr  QHS


 IVPB


   4/11/20 21:00


 4/18/20 20:59   


 


 


 Dextrose


  (Dextrose 50%)  25 ml  Q30M  PRN


 IV


 Hypoglycemia  4/10/20 21:45


 7/9/20 21:44   


 


 


 Dextrose


  (Dextrose 50%)  50 ml  Q30M  PRN


 IV


 Hypoglycemia  4/10/20 21:45


 7/9/20 21:44   


 


 


 Insulin Aspart


  (NovoLOG)    Q6HR


 SUBQ


   4/11/20 09:30


 7/10/20 09:29   


 


 


 Iohexol


  (OMNIPAQUE-300


 100ml)  100 ml  NOW  PRN


 INJ


 Radiology Procedure  4/10/20 20:00


 4/12/20 19:54   


 


 


 Potassium Chloride


  (K-Dur)  40 meq  ONCE


 ORAL


   4/11/20 08:30


 4/11/20 10:00  4/11/20 09:07


 


 


 Sodium  1,000 ml @ 


 150 mls/hr  Q6H40M


 IV


   4/11/20 08:30


 5/11/20 08:29 UNV  


 











Assessment/Plan


Assessment/Plan:


65-year-old female with PMH of medical noncompliance, DM type II who presents 

with b/l weakness, N/V, GLF, on admission pt found to be in DKA with 's.





#DKA


#UTI


#Uncontrolled DM, Hgb A1C 10.4


-admit to SDU


-AG 21


-initiate Insulin ggt 


-check BMP q4 hrs


-accuchecks, ISS q6 hr


-cont. IVF hydration


-CTX given in ED


-CT abd reviewed


-BCx and UCx pending


-educated pt on diabetic compliance


-ID, Dr. Looney: CTX





#Right sided weakness


#SDH 3mm no midline shift


-found on CT head


-pt AOx3, pt noted fall 3 days ago


-keppra BID


-fall precautions


-PT treat and eval


-Neurology consulted, Dr. Cheney, recs aprpeciated





#Hypokalemia


#Hypophosphatemia


#Hypernatremia


-Hypernatremia likely 2/2 free water deficit


-replace electrolytes, cont. to monitor and replace PRN


-Nephro consulted, IVF per nephro





DVT - SCDs for now given SDH





I spent 70 minutes on this patient's case, and 50% was dedicated to counseling 

and/or care coordination witch included communication with RN, consulting MDs, 

case management 





I spent an additional 25 minutes on review of medical records including prior 

hospital records, consult notes, progress notes, procedures, imaging, labs, 

hemodynamics, and other clinical documentation.











Anitha Ochoa M.D. Apr 11, 2020 09:53

## 2020-04-11 NOTE — NUR
NURSE NOTES:

Dr. Ochoa(on call) paged for critical results of K 2.6 and some missing admission orders. New 
orders received.

## 2020-04-11 NOTE — NUR
NURSE NOTES:

Received patient from ED by alyssa . patient is awake oriented x3. No SOB with RA. Denies 
any pain or discomfort at this time. VSS. Afebrile. SR on cardiac monitor.skin body 
assessment done. intact. no open wound.given hospital orientation. call light in reach. bed 
in low position, locked and  bed alarm on. NPO now.safety measures initiated.will continue 
to monitor.

## 2020-04-11 NOTE — NEUROLOGY PROGRESS NOTE
Interim History


Repeat ct brain this am stable from prior.


cont keppra ppx


SBP < 150





Objective


Physical Exam





Last Vital Signs








  Date Time  Temp Pulse Resp B/P (MAP) Pulse Ox O2 Delivery O2 Flow Rate FiO2


 


4/11/20 16:00      Room Air  


 


4/11/20 14:00  104  113/54    


 


4/11/20 03:40 98.6  26  97   











Laboratory Tests








Test


  4/10/20


17:21 4/10/20


20:40 4/11/20


01:45 4/11/20


06:10


 


White Blood Count


  9.5 K/UL


(4.8-10.8) 


  8.4 K/UL


(4.8-10.8) 


 


 


Red Blood Count


  4.85 M/UL


(4.20-5.40) 


  4.82 M/UL


(4.20-5.40) 


 


 


Hemoglobin


  14.9 G/DL


(12.0-16.0) 


  15.0 G/DL


(12.0-16.0) 


 


 


Hematocrit


  45.4 %


(37.0-47.0) 


  42.2 %


(37.0-47.0) 


 


 


Mean Corpuscular Volume 94 FL (80-99)    88 FL (80-99)   


 


Mean Corpuscular Hemoglobin


  30.7 PG


(27.0-31.0) 


  31.1 PG


(27.0-31.0)  H 


 


 


Mean Corpuscular Hemoglobin


Concent 32.7 G/DL


(32.0-36.0) 


  35.6 G/DL


(32.0-36.0) 


 


 


Red Cell Distribution Width


  13.2 %


(11.6-14.8) 


  11.7 %


(11.6-14.8) 


 


 


Platelet Count


  85 K/UL


(150-450)  L 


  82 K/UL


(150-450)  L 


 


 


Mean Platelet Volume


  8.2 FL


(6.5-10.1) 


  8.2 FL


(6.5-10.1) 


 


 


Neutrophils (%) (Auto)


  86.5 %


(45.0-75.0)  H 


  % (45.0-75.0)


  


 


 


Lymphocytes (%) (Auto)


  7.3 %


(20.0-45.0)  L 


  % (20.0-45.0)


  


 


 


Monocytes (%) (Auto)


  5.3 %


(1.0-10.0) 


   % (1.0-10.0)  


  


 


 


Eosinophils (%) (Auto)


  0.0 %


(0.0-3.0) 


   % (0.0-3.0)  


  


 


 


Basophils (%) (Auto)


  0.8 %


(0.0-2.0) 


   % (0.0-2.0)  


  


 


 


Sodium Level


  154 MMOL/L


(136-145)  H 


  160 MMOL/L


(136-145)  H 160 MMOL/L


(136-145)  H


 


Potassium Level


  3.1 MMOL/L


(3.5-5.1)  L 


  2.1 MMOL/L


(3.5-5.1)  *L 2.6 MMOL/L


(3.5-5.1)  *L


 


Chloride Level


  117 MMOL/L


()  H 


  121 MMOL/L


()  H 123 MMOL/L


()  H


 


Carbon Dioxide Level


  11 MMOL/L


(21-32)  L 


  19 MMOL/L


(21-32)  L 15 MMOL/L


(21-32)  L


 


Anion Gap


  26 mmol/L


(5-15)  H 


  21 mmol/L


(5-15)  H 21 mmol/L


(5-15)  H


 


Blood Urea Nitrogen


  17 mg/dL


(7-18) 


  14 mg/dL


(7-18) 14 mg/dL


(7-18)


 


Creatinine


  0.7 MG/DL


(0.55-1.30) 


  0.7 MG/DL


(0.55-1.30) 0.6 MG/DL


(0.55-1.30)


 


Estimat Glomerular Filtration


Rate > 60 mL/min


(>60) 


  > 60 mL/min


(>60) > 60 mL/min


(>60)


 


Glucose Level


  423 MG/DL


()  H 


  270 MG/DL


()  #H 315 MG/DL


()  H


 


Calcium Level


  9.7 MG/DL


(8.5-10.1) 


  9.1 MG/DL


(8.5-10.1) 9.0 MG/DL


(8.5-10.1)


 


Magnesium Level


  2.5 MG/DL


(1.8-2.4)  H 


  


  


 


 


Total Bilirubin


  0.5 MG/DL


(0.2-1.0) 


  


  


 


 


Aspartate Amino Transf


(AST/SGOT) 46 U/L (15-37)


H 


  


  


 


 


Alanine Aminotransferase


(ALT/SGPT) 40 U/L (12-78)


  


  


  


 


 


Alkaline Phosphatase


  127 U/L


()  H 


  


  


 


 


Total Protein


  7.4 G/DL


(6.4-8.2) 


  


  


 


 


Albumin


  2.3 G/DL


(3.4-5.0)  L 


  


  


 


 


Globulin 5.1 g/dL     


 


Albumin/Globulin Ratio


  0.5 (1.0-2.7)


L 


  


  


 


 


Lipase


  70 U/L


()  L 


  


  


 


 


Acetone Level


  Positive-moderate


(NEGATIVE) 


  


  


 


 


Urine Color  Pale yellow    


 


Urine Appearance


  


  Slightly


cloudy 


  


 


 


Urine pH  5 (4.5-8.0)    


 


Urine Specific Gravity


  


  1.010


(1.005-1.035) 


  


 


 


Urine Protein


  


  3+ (NEGATIVE)


H 


  


 


 


Urine Glucose (UA)


  


  4+ (NEGATIVE)


H 


  


 


 


Urine Ketones


  


  4+ (NEGATIVE)


H 


  


 


 


Urine Blood


  


  4+ (NEGATIVE)


H 


  


 


 


Urine Nitrite


  


  Positive


(NEGATIVE)  H 


  


 


 


Urine Bilirubin


  


  Negative


(NEGATIVE) 


  


 


 


Urine Urobilinogen


  


  Normal MG/DL


(0.0-1.0) 


  


 


 


Urine Leukocyte Esterase


  


  2+ (NEGATIVE)


H 


  


 


 


Urine RBC


  


  0-2 /HPF (0 -


2) 


  


 


 


Urine WBC


  


  Tntc /HPF (0 -


2)  H 


  


 


 


Urine Squamous Epithelial


Cells 


  None /LPF


(NONE/OCC) 


  


 


 


Urine Bacteria


  


  Many /HPF


(NONE)  H 


  


 


 


Differential Total Cells


Counted 


  


  100  


  


 


 


Neutrophils % (Manual)   82 % (45-75)  H 


 


Lymphocytes % (Manual)   5 % (20-45)  L 


 


Monocytes % (Manual)   3 % (1-10)   


 


Eosinophils % (Manual)   0 % (0-3)   


 


Basophils % (Manual)   0 % (0-2)   


 


Band Neutrophils   10 % (0-8)  H 


 


Platelet Estimate   Decreased  L 


 


Platelet Morphology   Normal   


 


Hemoglobin A1c


  


  


  10.4 %


(4.3-6.0)  H 


 


 


D-Dimer


  


  


  


  6.44 mg/L FEU


(0.00-0.49)  H


 


Phosphorus Level


  


  


  


  1.4 MG/DL


(2.5-4.9)  L


 


Test


  4/11/20


10:26 4/11/20


13:10 4/11/20


15:30 


 


 


White Blood Count


  10.2 K/UL


(4.8-10.8) 


  


  


 


 


Red Blood Count


  5.24 M/UL


(4.20-5.40) 


  


  


 


 


Hemoglobin


  16.3 G/DL


(12.0-16.0)  H 


  


  


 


 


Hematocrit


  46.6 %


(37.0-47.0) 


  


  


 


 


Mean Corpuscular Volume 89 FL (80-99)     


 


Mean Corpuscular Hemoglobin


  31.2 PG


(27.0-31.0)  H 


  


  


 


 


Mean Corpuscular Hemoglobin


Concent 35.0 G/DL


(32.0-36.0) 


  


  


 


 


Red Cell Distribution Width


  12.1 %


(11.6-14.8) 


  


  


 


 


Platelet Count


  78 K/UL


(150-450)  L 


  


  


 


 


Mean Platelet Volume


  8.1 FL


(6.5-10.1) 


  


  


 


 


Neutrophils (%) (Auto)


  % (45.0-75.0)


  


  


  


 


 


Lymphocytes (%) (Auto)


  % (20.0-45.0)


  


  


  


 


 


Monocytes (%) (Auto)  % (1.0-10.0)     


 


Eosinophils (%) (Auto)  % (0.0-3.0)     


 


Basophils (%) (Auto)  % (0.0-2.0)     


 


Differential Total Cells


Counted 100  


  


  


  


 


 


Neutrophils % (Manual) 88 % (45-75)  H   


 


Lymphocytes % (Manual) 5 % (20-45)  L   


 


Monocytes % (Manual) 5 % (1-10)     


 


Eosinophils % (Manual) 1 % (0-3)     


 


Basophils % (Manual) 1 % (0-2)     


 


Band Neutrophils 0 % (0-8)     


 


Platelet Estimate Decreased  L   


 


Platelet Morphology Normal     


 


Red Blood Cell Morphology Normal     


 


Sodium Level


  161 MMOL/L


(136-145)  *H 


  162 MMOL/L


(136-145)  *H 


 


 


Potassium Level


  3.1 MMOL/L


(3.5-5.1)  L 


  3.9 MMOL/L


(3.5-5.1) 


 


 


Chloride Level


  122 MMOL/L


()  H 


  128 MMOL/L


()  H 


 


 


Carbon Dioxide Level


  14 MMOL/L


(21-32)  L 


  17 MMOL/L


(21-32)  L 


 


 


Anion Gap


  24 mmol/L


(5-15)  H 


  17 mmol/L


(5-15)  H 


 


 


Blood Urea Nitrogen


  14 mg/dL


(7-18) 


  13 mg/dL


(7-18) 


 


 


Creatinine


  0.7 MG/DL


(0.55-1.30) 


  0.5 MG/DL


(0.55-1.30)  L 


 


 


Estimat Glomerular Filtration


Rate > 60 mL/min


(>60) 


  > 60 mL/min


(>60) 


 


 


Glucose Level


  345 MG/DL


()  H 


  281 MG/DL


()  H 


 


 


Calcium Level


  9.3 MG/DL


(8.5-10.1) 


  9.1 MG/DL


(8.5-10.1) 


 


 


Phosphorus Level


  1.7 MG/DL


(2.5-4.9)  L 


  


  


 


 


Urine Random Sodium


  


  61 mmol/L


() 


  


 


 


Urine Opiates Screen


  


  Negative


(NEGATIVE) 


  


 


 


Urine Barbiturates Screen


  


  Negative


(NEGATIVE) 


  


 


 


Phencyclidine (PCP) Screen


  


  Negative


(NEGATIVE) 


  


 


 


Urine Amphetamines Screen


  


  Negative


(NEGATIVE) 


  


 


 


Urine Benzodiazepines Screen


  


  Negative


(NEGATIVE) 


  


 


 


Urine Cocaine Screen


  


  Negative


(NEGATIVE) 


  


 


 


Urine Marijuana (THC) Screen


  


  Negative


(NEGATIVE) 


  


 








Head:  normocophalic


Neck:  no rigidity


EENT:  benign





Neurologic Exam


Mental Status:  awake


Speech:  normal speech


Language:  normal language


Cranial Nerve VII:  no facial asymmetry


Objective


somnolent, wakes up.


Oriented  x 2


Non focal


cc 35 min





Impression/Recommendations


Problems:  


(1) STEMI (ST elevation myocardial infarction)


(2) Atrial fibrillation with rapid ventricular response


(3) Prolonged Q-T interval on ECG


(4) DKA (diabetic ketoacidoses)


(5) Hypernatremia


(6) UTI (urinary tract infection)


(7) Subdural hematoma


Diagnostic Impression


Repeat ct brain this am stable from prior.


cont keppra ppx 500 mg bid - if too sedating will lower to 250


SBP < 150


Speech eval pending


PT Osmin Stephens MD Apr 11, 2020 17:03

## 2020-04-11 NOTE — CONSULTATION
Consult Note


Consult Note


I was asked to evaluate the patient for hypernatremia


Patient in [place], Amharic speaker


History states that the patient has been noncompliant,


She has been utilizing alternative medicine to treat her diabetes as an 

outpatient





Emergency room note:





HPI: 65-year-old female with history of diabetes on insulin presents for 

weakness and elevated blood sugar levels.  She arrives from home by EMS with 

glucose readings greater than 500 on fingerstick per EMS.  She has had 5 days 

of vomiting and diarrhea.  Unable tolerate food, water or her medications.  

Denies fever or chills.  Denies cough or chest pain.  Feels profoundly weak.  

Denies dysuria hematuria.


PMH: Insulin-dependent diabetes


 


Allergies: None reported


 


Social Hx: None reported





COVID-19 Screening


Contact w/high risk pt:  No


Recent Travel to affected area:  No


Experienced COVID-19 symptoms?:  No





Hx Cardiac Problems:  Yes


Hx Diabetes:  Yes





Patient seen and examined


Has leg weakness


Has a Vincent catheter


Assessment/Plan





Hypernatremia most likely due to free water deficit


Hypokalemia, hypophosphatemia


Hyperglycemia and DKA


Evidence of UTI


Right-sided weakness with 3 mm subdural hematoma found in CT scan








Suggestion


Hypotonic IV solution


Potassium and phosphorus and magnesium supplement as needed


N.p.o. except medication


Low dose of Lopressor


Monitor renal parameters and electrolytes


Neuro eval


Keep the blood pressure and blood sugar in check


Urine studies


Per orders











Jonathan Jacobson MD Apr 11, 2020 12:16

## 2020-04-11 NOTE — NUR
ER DISCHARGE NOTE:



Patient is cleared to be discharged to SDU per ERMD, pt is aox3, on room air, 
with stable vital signs. pt was able to verbalize understanding. pt took all 
belongings. Report given to DEREK Jimenez. PT transported to unit on monitor with 
2 RNs.

## 2020-04-11 NOTE — NUR
NURSE NOTES:

Dr. Ochoa paged for change of condition with slurred speech and c/o pain in bilateral legs. 
Tylenol 650mg Q4 prn for pain/temp >100.5, Tramadol 25mg Q6 PRN for severe pain, 
Atorvastatin 40mg qhs ordered and orders read back

## 2020-04-11 NOTE — CONSULTATION
History of Present Illness


General


Date patient seen:  Apr 10, 2020


Chief Complaint:  Abnormal Labs


Reason for Consultation:  ams





Present Illness


HPI


65-year-old female with history of diabetes on insulin presents for weakness 

and elevated blood sugar levels.  She arrives from home by EMS with glucose 

readings greater than 500 on fingerstick per EMS.  She has had 5 days of 

vomiting and diarrhea.  Unable tolerate food, water or her medications.  Denies 

fever or chills.  Denies cough or chest pain.  Feels profoundly weak.  Denies 

dysuria hematuria.


 


ct brain with small SDH, incidental small subdural hemorrhage along the left 

falx and left tentorium.





PMH: Insulin-dependent diabetes


 


PSH: Reviewed


Allergies:  


Coded Allergies:  


     No Known Allergies (Unverified , 4/15/15)





Medication History


Scheduled


No Known Medications* (NKM - No Known Medications*), 0 ., (Reported)





Patient History


Healthcare decision maker





Resuscitation status


Full Code


Advanced Directive on File


No





Physical Exam


General Appearance:  lethargic


Lines, tubes and drains:  peripheral


HEENT:  normocephalic, atraumatic


Physical Exam Narrative


wakes up and follows


moves all 4


cc 35 min





Last 24 Hour Vital Signs








  Date Time  Temp Pulse Resp B/P (MAP) Pulse Ox O2 Delivery O2 Flow Rate FiO2


 


4/11/20 16:00      Room Air  


 


4/11/20 14:00  104  113/54    


 


4/11/20 12:00      Room Air  


 


4/11/20 12:00  95      


 


4/11/20 08:00      Room Air  


 


4/11/20 08:00  94      


 


4/11/20 04:15  100      


 


4/11/20 04:00      Room Air  


 


4/11/20 03:40 98.6 102 26 134/65 97 Room Air  


 


4/11/20 03:20 98.6 102 26 134/65 97 Room Air  


 


4/11/20 01:30 98.6 108 26 135/67 97 Room Air  


 


4/10/20 23:57 98.6 102 16 134/57 97 Room Air  


 


4/10/20 23:06 99.1       


 


4/10/20 21:35 98.7 97 18 135/86 99 Room Air  


 


4/10/20 20:47 99.1       


 


4/10/20 19:30 99.1 85 18 125/79 99 Room Air  


 


4/10/20 17:50 99.1 90 18 130/71 99 Room Air  

















Intake and Output  


 


 4/10/20 4/11/20





 19:00 07:00


 


Intake Total 0 ml 100 ml


 


Output Total  550 ml


 


Balance 0 ml -450 ml


 


  


 


Intake Oral 0 ml 100 ml


 


Output Urine Total  550 ml











Laboratory Tests








Test


  4/10/20


17:21 4/10/20


20:40 4/11/20


01:45 4/11/20


06:10


 


White Blood Count


  9.5 K/UL


(4.8-10.8) 


  8.4 K/UL


(4.8-10.8) 


 


 


Red Blood Count


  4.85 M/UL


(4.20-5.40) 


  4.82 M/UL


(4.20-5.40) 


 


 


Hemoglobin


  14.9 G/DL


(12.0-16.0) 


  15.0 G/DL


(12.0-16.0) 


 


 


Hematocrit


  45.4 %


(37.0-47.0) 


  42.2 %


(37.0-47.0) 


 


 


Mean Corpuscular Volume 94 FL (80-99)    88 FL (80-99)   


 


Mean Corpuscular Hemoglobin


  30.7 PG


(27.0-31.0) 


  31.1 PG


(27.0-31.0)  H 


 


 


Mean Corpuscular Hemoglobin


Concent 32.7 G/DL


(32.0-36.0) 


  35.6 G/DL


(32.0-36.0) 


 


 


Red Cell Distribution Width


  13.2 %


(11.6-14.8) 


  11.7 %


(11.6-14.8) 


 


 


Platelet Count


  85 K/UL


(150-450)  L 


  82 K/UL


(150-450)  L 


 


 


Mean Platelet Volume


  8.2 FL


(6.5-10.1) 


  8.2 FL


(6.5-10.1) 


 


 


Neutrophils (%) (Auto)


  86.5 %


(45.0-75.0)  H 


  % (45.0-75.0)


  


 


 


Lymphocytes (%) (Auto)


  7.3 %


(20.0-45.0)  L 


  % (20.0-45.0)


  


 


 


Monocytes (%) (Auto)


  5.3 %


(1.0-10.0) 


   % (1.0-10.0)  


  


 


 


Eosinophils (%) (Auto)


  0.0 %


(0.0-3.0) 


   % (0.0-3.0)  


  


 


 


Basophils (%) (Auto)


  0.8 %


(0.0-2.0) 


   % (0.0-2.0)  


  


 


 


Sodium Level


  154 MMOL/L


(136-145)  H 


  160 MMOL/L


(136-145)  H 160 MMOL/L


(136-145)  H


 


Potassium Level


  3.1 MMOL/L


(3.5-5.1)  L 


  2.1 MMOL/L


(3.5-5.1)  *L 2.6 MMOL/L


(3.5-5.1)  *L


 


Chloride Level


  117 MMOL/L


()  H 


  121 MMOL/L


()  H 123 MMOL/L


()  H


 


Carbon Dioxide Level


  11 MMOL/L


(21-32)  L 


  19 MMOL/L


(21-32)  L 15 MMOL/L


(21-32)  L


 


Anion Gap


  26 mmol/L


(5-15)  H 


  21 mmol/L


(5-15)  H 21 mmol/L


(5-15)  H


 


Blood Urea Nitrogen


  17 mg/dL


(7-18) 


  14 mg/dL


(7-18) 14 mg/dL


(7-18)


 


Creatinine


  0.7 MG/DL


(0.55-1.30) 


  0.7 MG/DL


(0.55-1.30) 0.6 MG/DL


(0.55-1.30)


 


Estimat Glomerular Filtration


Rate > 60 mL/min


(>60) 


  > 60 mL/min


(>60) > 60 mL/min


(>60)


 


Glucose Level


  423 MG/DL


()  H 


  270 MG/DL


()  #H 315 MG/DL


()  H


 


Calcium Level


  9.7 MG/DL


(8.5-10.1) 


  9.1 MG/DL


(8.5-10.1) 9.0 MG/DL


(8.5-10.1)


 


Magnesium Level


  2.5 MG/DL


(1.8-2.4)  H 


  


  


 


 


Total Bilirubin


  0.5 MG/DL


(0.2-1.0) 


  


  


 


 


Aspartate Amino Transf


(AST/SGOT) 46 U/L (15-37)


H 


  


  


 


 


Alanine Aminotransferase


(ALT/SGPT) 40 U/L (12-78)


  


  


  


 


 


Alkaline Phosphatase


  127 U/L


()  H 


  


  


 


 


Total Protein


  7.4 G/DL


(6.4-8.2) 


  


  


 


 


Albumin


  2.3 G/DL


(3.4-5.0)  L 


  


  


 


 


Globulin 5.1 g/dL     


 


Albumin/Globulin Ratio


  0.5 (1.0-2.7)


L 


  


  


 


 


Lipase


  70 U/L


()  L 


  


  


 


 


Acetone Level


  Positive-moderate


(NEGATIVE) 


  


  


 


 


Urine Color  Pale yellow    


 


Urine Appearance


  


  Slightly


cloudy 


  


 


 


Urine pH  5 (4.5-8.0)    


 


Urine Specific Gravity


  


  1.010


(1.005-1.035) 


  


 


 


Urine Protein


  


  3+ (NEGATIVE)


H 


  


 


 


Urine Glucose (UA)


  


  4+ (NEGATIVE)


H 


  


 


 


Urine Ketones


  


  4+ (NEGATIVE)


H 


  


 


 


Urine Blood


  


  4+ (NEGATIVE)


H 


  


 


 


Urine Nitrite


  


  Positive


(NEGATIVE)  H 


  


 


 


Urine Bilirubin


  


  Negative


(NEGATIVE) 


  


 


 


Urine Urobilinogen


  


  Normal MG/DL


(0.0-1.0) 


  


 


 


Urine Leukocyte Esterase


  


  2+ (NEGATIVE)


H 


  


 


 


Urine RBC


  


  0-2 /HPF (0 -


2) 


  


 


 


Urine WBC


  


  Tntc /HPF (0 -


2)  H 


  


 


 


Urine Squamous Epithelial


Cells 


  None /LPF


(NONE/OCC) 


  


 


 


Urine Bacteria


  


  Many /HPF


(NONE)  H 


  


 


 


Differential Total Cells


Counted 


  


  100  


  


 


 


Neutrophils % (Manual)   82 % (45-75)  H 


 


Lymphocytes % (Manual)   5 % (20-45)  L 


 


Monocytes % (Manual)   3 % (1-10)   


 


Eosinophils % (Manual)   0 % (0-3)   


 


Basophils % (Manual)   0 % (0-2)   


 


Band Neutrophils   10 % (0-8)  H 


 


Platelet Estimate   Decreased  L 


 


Platelet Morphology   Normal   


 


Hemoglobin A1c


  


  


  10.4 %


(4.3-6.0)  H 


 


 


D-Dimer


  


  


  


  6.44 mg/L FEU


(0.00-0.49)  H


 


Phosphorus Level


  


  


  


  1.4 MG/DL


(2.5-4.9)  L


 


Test


  4/11/20


10:26 4/11/20


13:10 4/11/20


15:30 


 


 


White Blood Count


  10.2 K/UL


(4.8-10.8) 


  


  


 


 


Red Blood Count


  5.24 M/UL


(4.20-5.40) 


  


  


 


 


Hemoglobin


  16.3 G/DL


(12.0-16.0)  H 


  


  


 


 


Hematocrit


  46.6 %


(37.0-47.0) 


  


  


 


 


Mean Corpuscular Volume 89 FL (80-99)     


 


Mean Corpuscular Hemoglobin


  31.2 PG


(27.0-31.0)  H 


  


  


 


 


Mean Corpuscular Hemoglobin


Concent 35.0 G/DL


(32.0-36.0) 


  


  


 


 


Red Cell Distribution Width


  12.1 %


(11.6-14.8) 


  


  


 


 


Platelet Count


  78 K/UL


(150-450)  L 


  


  


 


 


Mean Platelet Volume


  8.1 FL


(6.5-10.1) 


  


  


 


 


Neutrophils (%) (Auto)


  % (45.0-75.0)


  


  


  


 


 


Lymphocytes (%) (Auto)


  % (20.0-45.0)


  


  


  


 


 


Monocytes (%) (Auto)  % (1.0-10.0)     


 


Eosinophils (%) (Auto)  % (0.0-3.0)     


 


Basophils (%) (Auto)  % (0.0-2.0)     


 


Differential Total Cells


Counted 100  


  


  


  


 


 


Neutrophils % (Manual) 88 % (45-75)  H   


 


Lymphocytes % (Manual) 5 % (20-45)  L   


 


Monocytes % (Manual) 5 % (1-10)     


 


Eosinophils % (Manual) 1 % (0-3)     


 


Basophils % (Manual) 1 % (0-2)     


 


Band Neutrophils 0 % (0-8)     


 


Platelet Estimate Decreased  L   


 


Platelet Morphology Normal     


 


Red Blood Cell Morphology Normal     


 


Sodium Level


  161 MMOL/L


(136-145)  *H 


  162 MMOL/L


(136-145)  *H 


 


 


Potassium Level


  3.1 MMOL/L


(3.5-5.1)  L 


  3.9 MMOL/L


(3.5-5.1) 


 


 


Chloride Level


  122 MMOL/L


()  H 


  128 MMOL/L


()  H 


 


 


Carbon Dioxide Level


  14 MMOL/L


(21-32)  L 


  17 MMOL/L


(21-32)  L 


 


 


Anion Gap


  24 mmol/L


(5-15)  H 


  17 mmol/L


(5-15)  H 


 


 


Blood Urea Nitrogen


  14 mg/dL


(7-18) 


  13 mg/dL


(7-18) 


 


 


Creatinine


  0.7 MG/DL


(0.55-1.30) 


  0.5 MG/DL


(0.55-1.30)  L 


 


 


Estimat Glomerular Filtration


Rate > 60 mL/min


(>60) 


  > 60 mL/min


(>60) 


 


 


Glucose Level


  345 MG/DL


()  H 


  281 MG/DL


()  H 


 


 


Calcium Level


  9.3 MG/DL


(8.5-10.1) 


  9.1 MG/DL


(8.5-10.1) 


 


 


Phosphorus Level


  1.7 MG/DL


(2.5-4.9)  L 


  


  


 


 


Urine Random Sodium


  


  61 mmol/L


() 


  


 


 


Urine Opiates Screen


  


  Negative


(NEGATIVE) 


  


 


 


Urine Barbiturates Screen


  


  Negative


(NEGATIVE) 


  


 


 


Phencyclidine (PCP) Screen


  


  Negative


(NEGATIVE) 


  


 


 


Urine Amphetamines Screen


  


  Negative


(NEGATIVE) 


  


 


 


Urine Benzodiazepines Screen


  


  Negative


(NEGATIVE) 


  


 


 


Urine Cocaine Screen


  


  Negative


(NEGATIVE) 


  


 


 


Urine Marijuana (THC) Screen


  


  Negative


(NEGATIVE) 


  


 








Height (Feet):  5


Height (Inches):  4.00


Weight (Pounds):  110


Medications





Current Medications








 Medications


  (Trade)  Dose


 Ordered  Sig/Meghan


 Route


 PRN Reason  Start Time


 Stop Time Status Last Admin


Dose Admin


 


 Acetaminophen


  (Tylenol)  650 mg  Q4H  PRN


 ORAL


 For Pain  4/11/20 16:30


 5/11/20 16:29   


 


 


 Acetaminophen


  (Tylenol)  650 mg  Q4H  PRN


 ORAL


 Temp >100.5  4/11/20 16:30


 5/11/20 16:29   


 


 


 Atorvastatin


 Calcium


  (Lipitor)  40 mg  BEDTIME


 ORAL


   4/11/20 21:00


 7/10/20 20:59   


 


 


 Ceftriaxone


 Sodium 1 gm/


 Sodium Chloride  55 ml @ 


 110 mls/hr  QHS


 IVPB


   4/11/20 21:00


 4/18/20 20:59   


 


 


 Dextrose


  (Dextrose 50%)  25 ml  Q30M  PRN


 IV


 Hypoglycemia  4/10/20 21:45


 7/9/20 21:44   


 


 


 Dextrose


  (Dextrose 50%)  50 ml  Q30M  PRN


 IV


 Hypoglycemia  4/10/20 21:45


 7/9/20 21:44   


 


 


 Insulin Aspart


  (NovoLOG)    Q6HR


 SUBQ


   4/11/20 09:30


 7/10/20 09:29  4/11/20 12:00


 


 


 Iohexol


  (OMNIPAQUE-300


 100ml)  100 ml  NOW  PRN


 INJ


 Radiology Procedure  4/10/20 20:00


 4/12/20 19:54   


 


 


 Levetiracetam  100 ml @ 


 400 mls/hr  Q12HR


 IVPB


   4/11/20 11:00


 7/10/20 10:59  4/11/20 12:28


 


 


 Metoprolol


 Tartrate


  (Lopressor)  12.5 mg  Q12HR


 ORAL


   4/11/20 21:00


 7/10/20 20:59   


 


 


 Pantoprazole


  (Protonix)  40 mg  EVERY 12  HOURS


 IVP


   4/11/20 13:00


 5/11/20 12:59  4/11/20 12:52


 


 


 Phosphorus


  (Phospha 250


 Neutral)  250 mg  THREE TIMES A  DAY


 ORAL


   4/11/20 13:00


 5/11/20 12:59  4/11/20 12:51


 


 


 Potassium


 Chloride 40 meq/


 Sodium Chloride  1,020 ml @ 


 150 mls/hr  Q6H48M


 IV


   4/11/20 11:00


 5/11/20 10:59  4/11/20 11:44


 


 


 Potassium


 Phosphate 20 mm/


 Sodium Chloride  281.6667


 ml @ 


 46.944 m...  ONCE  ONCE


 IV


   4/11/20 11:00


 4/11/20 16:59  4/11/20 11:44


 


 


 Sodium Citrate


  (Bicitra)  30 ml  EVERY 6  HOURS


 ORAL


   4/11/20 12:30


 5/11/20 12:29  4/11/20 12:51


 


 


 Tramadol HCl


  (Ultram)  25 mg  Q6H  PRN


 ORAL


 Severe Breakthru Pain (>7)  4/11/20 16:30


 4/18/20 16:29   


 











Assessment/Plan


Problem List:  


(1) STEMI (ST elevation myocardial infarction)


ICD Codes:  I21.3 - ST elevation (STEMI) myocardial infarction of unspecified 

site


SNOMED:  630743521


(2) Atrial fibrillation with rapid ventricular response


ICD Codes:  I48.91 - Unspecified atrial fibrillation


SNOMED:  394049503474377


(3) Prolonged Q-T interval on ECG


ICD Codes:  R94.31 - Abnormal electrocardiogram [ECG] [EKG]


SNOMED:  855591196


(4) DKA (diabetic ketoacidoses)


ICD Codes:  E11.10 - Type 2 diabetes mellitus with ketoacidosis without coma


SNOMED:  944686456, 09783763


(5) Hypernatremia


ICD Codes:  E87.0 - Hyperosmolality and hypernatremia


SNOMED:  456325016, 44635635


(6) UTI (urinary tract infection)


ICD Codes:  N39.0 - Urinary tract infection, site not specified


SNOMED:  01416070


(7) Subdural hematoma


ICD Codes:  S06.5X9A - Traumatic subdural hemorrhage with loss of consciousness 

of unspecified duration, initial encounter


SNOMED:  745718277


Assessment/Plan:


Acute sdh, 





monitor q2h neuro checks


repeat ct brain in 6-8 hours


keppra 500 mg bid


no antiplatelets


SBP < 150











Osmin Cheney MD Apr 11, 2020 16:57

## 2020-04-11 NOTE — INFECTIOUS DISEASES PROG NOTE
Assessment/Plan


Assessment/Plan








Full consult dictated:





A)





1) uti


2) dka


3) sdh, right sided weakness





P)





1) ceftriaxone


2) check urine culture


3) f/u labs


4) thank you





Subjective


Allergies:  


Coded Allergies:  


     No Known Allergies (Unverified , 4/15/15)





Objective


Vital Signs





Last 24 Hour Vital Signs








  Date Time  Temp Pulse Resp B/P (MAP) Pulse Ox O2 Delivery O2 Flow Rate FiO2


 


4/11/20 12:00      Room Air  


 


4/11/20 08:00      Room Air  


 


4/11/20 08:00  94      


 


4/11/20 04:15  100      


 


4/11/20 04:00      Room Air  


 


4/11/20 03:40 98.6 102 26 134/65 97 Room Air  


 


4/11/20 03:20 98.6 102 26 134/65 97 Room Air  


 


4/11/20 01:30 98.6 108 26 135/67 97 Room Air  


 


4/10/20 23:57 98.6 102 16 134/57 97 Room Air  


 


4/10/20 23:06 99.1       


 


4/10/20 21:35 98.7 97 18 135/86 99 Room Air  


 


4/10/20 20:47 99.1       


 


4/10/20 19:30 99.1 85 18 125/79 99 Room Air  


 


4/10/20 17:50 99.1 90 18 130/71 99 Room Air  


 


4/10/20 15:28 99.1 104 20 136/78 (97) 98 Room Air  








Height (Feet):  5


Height (Inches):  4.00


Weight (Pounds):  110





Laboratory Tests








Test


  4/10/20


17:21 4/10/20


20:40 4/11/20


01:45 4/11/20


06:10


 


White Blood Count


  9.5 K/UL


(4.8-10.8) 


  8.4 K/UL


(4.8-10.8) 


 


 


Red Blood Count


  4.85 M/UL


(4.20-5.40) 


  4.82 M/UL


(4.20-5.40) 


 


 


Hemoglobin


  14.9 G/DL


(12.0-16.0) 


  15.0 G/DL


(12.0-16.0) 


 


 


Hematocrit


  45.4 %


(37.0-47.0) 


  42.2 %


(37.0-47.0) 


 


 


Mean Corpuscular Volume 94 FL (80-99)    88 FL (80-99)   


 


Mean Corpuscular Hemoglobin


  30.7 PG


(27.0-31.0) 


  31.1 PG


(27.0-31.0)  H 


 


 


Mean Corpuscular Hemoglobin


Concent 32.7 G/DL


(32.0-36.0) 


  35.6 G/DL


(32.0-36.0) 


 


 


Red Cell Distribution Width


  13.2 %


(11.6-14.8) 


  11.7 %


(11.6-14.8) 


 


 


Platelet Count


  85 K/UL


(150-450)  L 


  82 K/UL


(150-450)  L 


 


 


Mean Platelet Volume


  8.2 FL


(6.5-10.1) 


  8.2 FL


(6.5-10.1) 


 


 


Neutrophils (%) (Auto)


  86.5 %


(45.0-75.0)  H 


  % (45.0-75.0)


  


 


 


Lymphocytes (%) (Auto)


  7.3 %


(20.0-45.0)  L 


  % (20.0-45.0)


  


 


 


Monocytes (%) (Auto)


  5.3 %


(1.0-10.0) 


   % (1.0-10.0)  


  


 


 


Eosinophils (%) (Auto)


  0.0 %


(0.0-3.0) 


   % (0.0-3.0)  


  


 


 


Basophils (%) (Auto)


  0.8 %


(0.0-2.0) 


   % (0.0-2.0)  


  


 


 


Sodium Level


  154 MMOL/L


(136-145)  H 


  160 MMOL/L


(136-145)  H 160 MMOL/L


(136-145)  H


 


Potassium Level


  3.1 MMOL/L


(3.5-5.1)  L 


  2.1 MMOL/L


(3.5-5.1)  *L 2.6 MMOL/L


(3.5-5.1)  *L


 


Chloride Level


  117 MMOL/L


()  H 


  121 MMOL/L


()  H 123 MMOL/L


()  H


 


Carbon Dioxide Level


  11 MMOL/L


(21-32)  L 


  19 MMOL/L


(21-32)  L 15 MMOL/L


(21-32)  L


 


Anion Gap


  26 mmol/L


(5-15)  H 


  21 mmol/L


(5-15)  H 21 mmol/L


(5-15)  H


 


Blood Urea Nitrogen


  17 mg/dL


(7-18) 


  14 mg/dL


(7-18) 14 mg/dL


(7-18)


 


Creatinine


  0.7 MG/DL


(0.55-1.30) 


  0.7 MG/DL


(0.55-1.30) 0.6 MG/DL


(0.55-1.30)


 


Estimat Glomerular Filtration


Rate > 60 mL/min


(>60) 


  > 60 mL/min


(>60) > 60 mL/min


(>60)


 


Glucose Level


  423 MG/DL


()  H 


  270 MG/DL


()  #H 315 MG/DL


()  H


 


Calcium Level


  9.7 MG/DL


(8.5-10.1) 


  9.1 MG/DL


(8.5-10.1) 9.0 MG/DL


(8.5-10.1)


 


Magnesium Level


  2.5 MG/DL


(1.8-2.4)  H 


  


  


 


 


Total Bilirubin


  0.5 MG/DL


(0.2-1.0) 


  


  


 


 


Aspartate Amino Transf


(AST/SGOT) 46 U/L (15-37)


H 


  


  


 


 


Alanine Aminotransferase


(ALT/SGPT) 40 U/L (12-78)


  


  


  


 


 


Alkaline Phosphatase


  127 U/L


()  H 


  


  


 


 


Total Protein


  7.4 G/DL


(6.4-8.2) 


  


  


 


 


Albumin


  2.3 G/DL


(3.4-5.0)  L 


  


  


 


 


Globulin 5.1 g/dL     


 


Albumin/Globulin Ratio


  0.5 (1.0-2.7)


L 


  


  


 


 


Lipase


  70 U/L


()  L 


  


  


 


 


Acetone Level


  Positive-moderate


(NEGATIVE) 


  


  


 


 


Urine Color  Pale yellow    


 


Urine Appearance


  


  Slightly


cloudy 


  


 


 


Urine pH  5 (4.5-8.0)    


 


Urine Specific Gravity


  


  1.010


(1.005-1.035) 


  


 


 


Urine Protein


  


  3+ (NEGATIVE)


H 


  


 


 


Urine Glucose (UA)


  


  4+ (NEGATIVE)


H 


  


 


 


Urine Ketones


  


  4+ (NEGATIVE)


H 


  


 


 


Urine Blood


  


  4+ (NEGATIVE)


H 


  


 


 


Urine Nitrite


  


  Positive


(NEGATIVE)  H 


  


 


 


Urine Bilirubin


  


  Negative


(NEGATIVE) 


  


 


 


Urine Urobilinogen


  


  Normal MG/DL


(0.0-1.0) 


  


 


 


Urine Leukocyte Esterase


  


  2+ (NEGATIVE)


H 


  


 


 


Urine RBC


  


  0-2 /HPF (0 -


2) 


  


 


 


Urine WBC


  


  Tntc /HPF (0 -


2)  H 


  


 


 


Urine Squamous Epithelial


Cells 


  None /LPF


(NONE/OCC) 


  


 


 


Urine Bacteria


  


  Many /HPF


(NONE)  H 


  


 


 


Differential Total Cells


Counted 


  


  100  


  


 


 


Neutrophils % (Manual)   82 % (45-75)  H 


 


Lymphocytes % (Manual)   5 % (20-45)  L 


 


Monocytes % (Manual)   3 % (1-10)   


 


Eosinophils % (Manual)   0 % (0-3)   


 


Basophils % (Manual)   0 % (0-2)   


 


Band Neutrophils   10 % (0-8)  H 


 


Platelet Estimate   Decreased  L 


 


Platelet Morphology   Normal   


 


Hemoglobin A1c


  


  


  10.4 %


(4.3-6.0)  H 


 


 


D-Dimer


  


  


  


  6.44 mg/L FEU


(0.00-0.49)  H


 


Phosphorus Level


  


  


  


  1.4 MG/DL


(2.5-4.9)  L


 


Test


  4/11/20


10:26 


  


  


 


 


White Blood Count


  10.2 K/UL


(4.8-10.8) 


  


  


 


 


Red Blood Count


  5.24 M/UL


(4.20-5.40) 


  


  


 


 


Hemoglobin


  16.3 G/DL


(12.0-16.0)  H 


  


  


 


 


Hematocrit


  46.6 %


(37.0-47.0) 


  


  


 


 


Mean Corpuscular Volume 89 FL (80-99)     


 


Mean Corpuscular Hemoglobin


  31.2 PG


(27.0-31.0)  H 


  


  


 


 


Mean Corpuscular Hemoglobin


Concent 35.0 G/DL


(32.0-36.0) 


  


  


 


 


Red Cell Distribution Width


  12.1 %


(11.6-14.8) 


  


  


 


 


Platelet Count


  78 K/UL


(150-450)  L 


  


  


 


 


Mean Platelet Volume


  8.1 FL


(6.5-10.1) 


  


  


 


 


Neutrophils (%) (Auto)


  % (45.0-75.0)


  


  


  


 


 


Lymphocytes (%) (Auto)


  % (20.0-45.0)


  


  


  


 


 


Monocytes (%) (Auto)  % (1.0-10.0)     


 


Eosinophils (%) (Auto)  % (0.0-3.0)     


 


Basophils (%) (Auto)  % (0.0-2.0)     


 


Neutrophils % (Manual) Pending     


 


Lymphocytes % (Manual) Pending     


 


Platelet Estimate Pending     


 


Platelet Morphology Pending     


 


Sodium Level


  161 MMOL/L


(136-145)  *H 


  


  


 


 


Potassium Level


  3.1 MMOL/L


(3.5-5.1)  L 


  


  


 


 


Chloride Level


  122 MMOL/L


()  H 


  


  


 


 


Carbon Dioxide Level


  14 MMOL/L


(21-32)  L 


  


  


 


 


Anion Gap


  24 mmol/L


(5-15)  H 


  


  


 


 


Blood Urea Nitrogen


  14 mg/dL


(7-18) 


  


  


 


 


Creatinine


  0.7 MG/DL


(0.55-1.30) 


  


  


 


 


Estimat Glomerular Filtration


Rate > 60 mL/min


(>60) 


  


  


 


 


Glucose Level


  345 MG/DL


()  H 


  


  


 


 


Calcium Level


  9.3 MG/DL


(8.5-10.1) 


  


  


 


 


Phosphorus Level


  1.7 MG/DL


(2.5-4.9)  L 


  


  


 











Current Medications








 Medications


  (Trade)  Dose


 Ordered  Sig/Meghan


 Route


 PRN Reason  Start Time


 Stop Time Status Last Admin


Dose Admin


 


 Ceftriaxone


 Sodium 1 gm/


 Sodium Chloride  55 ml @ 


 110 mls/hr  QHS


 IVPB


   4/11/20 21:00


 4/18/20 20:59   


 


 


 Dextrose


  (Dextrose 50%)  25 ml  Q30M  PRN


 IV


 Hypoglycemia  4/10/20 21:45


 7/9/20 21:44   


 


 


 Dextrose


  (Dextrose 50%)  50 ml  Q30M  PRN


 IV


 Hypoglycemia  4/10/20 21:45


 7/9/20 21:44   


 


 


 Insulin Aspart


  (NovoLOG)    Q6HR


 SUBQ


   4/11/20 09:30


 7/10/20 09:29  4/11/20 09:49


 


 


 Iohexol


  (OMNIPAQUE-300


 100ml)  100 ml  NOW  PRN


 INJ


 Radiology Procedure  4/10/20 20:00


 4/12/20 19:54   


 


 


 Levetiracetam  100 ml @ 


 400 mls/hr  Q12HR


 IVPB


   4/11/20 11:00


 7/10/20 10:59  4/11/20 12:28


 


 


 Metoprolol


 Tartrate


  (Lopressor)  12.5 mg  ONCE


 ORAL


   4/11/20 13:00


 4/11/20 15:00   


 


 


 Metoprolol


 Tartrate


  (Lopressor)  12.5 mg  Q12HR


 ORAL


   4/11/20 21:00


 7/10/20 20:59   


 


 


 Pantoprazole


  (Protonix)  40 mg  EVERY 12  HOURS


 IVP


   4/11/20 13:00


 5/11/20 12:59   


 


 


 Phosphorus


  (Phospha 250


 Neutral)  250 mg  THREE TIMES A  DAY


 ORAL


   4/11/20 13:00


 5/11/20 12:59   


 


 


 Potassium


 Chloride 40 meq/


 Sodium Chloride  1,020 ml @ 


 150 mls/hr  Q6H48M


 IV


   4/11/20 11:00


 5/11/20 10:59  4/11/20 11:44


 


 


 Potassium


 Phosphate 20 mm/


 Sodium Chloride  281.6667


 ml @ 


 46.944 m...  ONCE  ONCE


 IV


   4/11/20 11:00


 4/11/20 16:59  4/11/20 11:44


 


 


 Sodium Citrate


  (Bicitra)  30 ml  EVERY 6  HOURS


 ORAL


   4/11/20 12:30


 5/11/20 12:29   


 

















Brijesh Looney MD Apr 11, 2020 12:32

## 2020-04-11 NOTE — NUR
NURSE NOTES:

Receive report and patient from DEREK ELVIN.Awake, oriented x3. no acute distress noted with 
RA. ST with HR of 102 VSS. Afebrile. Denies headache or N/V at this time. call light in 
reach. bed locked in low position. bed alarm on.safety measures continued.No neurological 
changes. will continue monitor patient.

## 2020-04-11 NOTE — NUR
ED Nurse Note:



Called [facility]; room still unavailable 

-------------------------------------------------------------------------------

Addendum: 04/11/20 at 0241 by STEVEN

-------------------------------------------------------------------------------

ED Nurse Note:



Called [facility]; room still unavailable

## 2020-04-11 NOTE — DIAGNOSTIC IMAGING REPORT
EXAM:

  CT Head Without Intravenous Contrast

 

CLINICAL HISTORY:

  WEAK

 

TECHNIQUE:

  Axial computed tomography images of the head/brain without intravenous 

contrast.  CTDI is 53 mGy and DLP is 1072 mGy-cm.  One or more of the 

following dose reduction techniques were used: automated exposure control,

 adjustment of the mA and/or kV according to patient size, use of 

iterative reconstruction technique.

 

COMPARISON:

  No relevant prior studies available.

 

FINDINGS:

  Brain:  There is an acute subdural hematoma along the left posterior 

falx measuring 3 mm in thickness which extends along the left tentorium 

measuring 2 mm in thickness.  No significant white matter disease.

  Ventricles:  Unremarkable.  No ventriculomegaly.

  Bones/joints:  Unremarkable.  No acute fracture.

  Soft tissues:  Unremarkable.

  Sinuses:  Unremarkable as visualized.  No acute sinusitis.

  Mastoid air cells:  Unremarkable as visualized.  No mastoid effusion.

 

IMPRESSION:     

  There is an acute subdural hematoma along the left posterior falx 

measuring 3 mm in thickness which extends along the left tentorium 

measuring 2 mm in thickness.  No mass-effect or midline shift is 

identified.

 

<MYCVCSECTION>

 

Communications:

 

04/11/20 09:52 Call Doctor Regarding Intracranial Hemorrhage, called RN 

Min on 04/11 09:52 (-07:00)

## 2020-04-11 NOTE — NUR
NURSE NOTES:

Received report from Barbara REED. Pt in bed awake and orientedx3, [place] speaking. Denied 
pain. On room air. Bed in lowest position and locked. Side railsx3 up for safety. IV site in 
right hand 22G TKO, RAC 22G running with 1/2NS W/KCL 20mEq @150ml/hr patent and 
asymptomatic. Call light within easy reach.  Will continue to plan of care.

## 2020-04-12 VITALS — SYSTOLIC BLOOD PRESSURE: 120 MMHG | DIASTOLIC BLOOD PRESSURE: 65 MMHG

## 2020-04-12 VITALS — DIASTOLIC BLOOD PRESSURE: 63 MMHG | SYSTOLIC BLOOD PRESSURE: 121 MMHG

## 2020-04-12 VITALS — DIASTOLIC BLOOD PRESSURE: 70 MMHG | SYSTOLIC BLOOD PRESSURE: 131 MMHG

## 2020-04-12 VITALS — DIASTOLIC BLOOD PRESSURE: 76 MMHG | SYSTOLIC BLOOD PRESSURE: 117 MMHG

## 2020-04-12 VITALS — DIASTOLIC BLOOD PRESSURE: 59 MMHG | SYSTOLIC BLOOD PRESSURE: 118 MMHG

## 2020-04-12 VITALS — DIASTOLIC BLOOD PRESSURE: 57 MMHG | SYSTOLIC BLOOD PRESSURE: 116 MMHG

## 2020-04-12 LAB
ADD MANUAL DIFF: YES
ALBUMIN SERPL-MCNC: 1.8 G/DL (ref 3.4–5)
ALBUMIN/GLOB SERPL: 0.4 {RATIO} (ref 1–2.7)
ALP SERPL-CCNC: 117 U/L (ref 46–116)
ALT SERPL-CCNC: 32 U/L (ref 12–78)
AMMONIA PLAS-SCNC: 33 UMOL/L (ref 11–32)
ANION GAP SERPL CALC-SCNC: 15 MMOL/L (ref 5–15)
AST SERPL-CCNC: 38 U/L (ref 15–37)
BILIRUB SERPL-MCNC: 0.4 MG/DL (ref 0.2–1)
BUN SERPL-MCNC: 14 MG/DL (ref 7–18)
CALCIUM SERPL-MCNC: 8.7 MG/DL (ref 8.5–10.1)
CHLORIDE SERPL-SCNC: 128 MMOL/L (ref 98–107)
CHOLEST SERPL-MCNC: 94 MG/DL (ref ?–200)
CO2 SERPL-SCNC: 23 MMOL/L (ref 21–32)
CREAT SERPL-MCNC: 0.5 MG/DL (ref 0.55–1.3)
ERYTHROCYTE [DISTWIDTH] IN BLOOD BY AUTOMATED COUNT: 11.9 % (ref 11.6–14.8)
GLOBULIN SER-MCNC: 4.5 G/DL
HCT VFR BLD CALC: 37.9 % (ref 37–47)
HDLC SERPL-MCNC: 19 MG/DL (ref 40–60)
HGB BLD-MCNC: 13.4 G/DL (ref 12–16)
MCV RBC AUTO: 88 FL (ref 80–99)
PHOSPHATE SERPL-MCNC: 1.5 MG/DL (ref 2.5–4.9)
PLATELET # BLD: 60 K/UL (ref 150–450)
POTASSIUM SERPL-SCNC: 3.7 MMOL/L (ref 3.5–5.1)
RBC # BLD AUTO: 4.33 M/UL (ref 4.2–5.4)
SODIUM SERPL-SCNC: 168 MMOL/L (ref 136–145)
TRIGL SERPL-MCNC: 122 MG/DL (ref 30–150)
WBC # BLD AUTO: 10.5 K/UL (ref 4.8–10.8)

## 2020-04-12 RX ADMIN — SODIUM CITRATE AND CITRIC ACID MONOHYDRATE SCH ML: 500; 334 SOLUTION ORAL at 00:37

## 2020-04-12 RX ADMIN — ATORVASTATIN CALCIUM SCH MG: 20 TABLET, FILM COATED ORAL at 20:56

## 2020-04-12 RX ADMIN — METOPROLOL TARTRATE SCH MG: 25 TABLET, FILM COATED ORAL at 08:22

## 2020-04-12 RX ADMIN — SODIUM CHLORIDE SCH MLS/HR: 900 INJECTION, SOLUTION INTRAVENOUS at 01:56

## 2020-04-12 RX ADMIN — DIBASIC SODIUM PHOSPHATE, MONOBASIC POTASSIUM PHOSPHATE AND MONOBASIC SODIUM PHOSPHATE SCH MG: 852; 155; 130 TABLET ORAL at 08:22

## 2020-04-12 RX ADMIN — INSULIN ASPART SCH UNITS: 100 INJECTION, SOLUTION INTRAVENOUS; SUBCUTANEOUS at 22:18

## 2020-04-12 RX ADMIN — PANTOPRAZOLE SODIUM SCH MG: 40 INJECTION, POWDER, FOR SOLUTION INTRAVENOUS at 08:22

## 2020-04-12 RX ADMIN — SODIUM CITRATE AND CITRIC ACID MONOHYDRATE SCH ML: 500; 334 SOLUTION ORAL at 17:19

## 2020-04-12 RX ADMIN — SODIUM CHLORIDE SCH MLS/HR: 900 INJECTION, SOLUTION INTRAVENOUS at 08:16

## 2020-04-12 RX ADMIN — DIBASIC SODIUM PHOSPHATE, MONOBASIC POTASSIUM PHOSPHATE AND MONOBASIC SODIUM PHOSPHATE SCH MG: 852; 155; 130 TABLET ORAL at 12:51

## 2020-04-12 RX ADMIN — INSULIN ASPART SCH UNITS: 100 INJECTION, SOLUTION INTRAVENOUS; SUBCUTANEOUS at 12:49

## 2020-04-12 RX ADMIN — INSULIN ASPART SCH UNITS: 100 INJECTION, SOLUTION INTRAVENOUS; SUBCUTANEOUS at 17:21

## 2020-04-12 RX ADMIN — DIBASIC SODIUM PHOSPHATE, MONOBASIC POTASSIUM PHOSPHATE AND MONOBASIC SODIUM PHOSPHATE SCH MG: 852; 155; 130 TABLET ORAL at 17:19

## 2020-04-12 RX ADMIN — SODIUM CITRATE AND CITRIC ACID MONOHYDRATE SCH ML: 500; 334 SOLUTION ORAL at 06:17

## 2020-04-12 RX ADMIN — LEVETIRACETAM SCH MLS/HR: 5 INJECTION INTRAVENOUS at 08:22

## 2020-04-12 RX ADMIN — SODIUM CITRATE AND CITRIC ACID MONOHYDRATE SCH ML: 500; 334 SOLUTION ORAL at 23:21

## 2020-04-12 RX ADMIN — LEVETIRACETAM SCH MLS/HR: 5 INJECTION INTRAVENOUS at 22:16

## 2020-04-12 RX ADMIN — PANTOPRAZOLE SODIUM SCH MG: 40 INJECTION, POWDER, FOR SOLUTION INTRAVENOUS at 20:55

## 2020-04-12 RX ADMIN — SODIUM CITRATE AND CITRIC ACID MONOHYDRATE SCH ML: 500; 334 SOLUTION ORAL at 12:51

## 2020-04-12 RX ADMIN — INSULIN ASPART SCH UNITS: 100 INJECTION, SOLUTION INTRAVENOUS; SUBCUTANEOUS at 00:39

## 2020-04-12 RX ADMIN — INSULIN ASPART SCH UNITS: 100 INJECTION, SOLUTION INTRAVENOUS; SUBCUTANEOUS at 06:17

## 2020-04-12 RX ADMIN — INSULIN ASPART SCH UNITS: 100 INJECTION, SOLUTION INTRAVENOUS; SUBCUTANEOUS at 17:22

## 2020-04-12 NOTE — CONSULTATION
DATE OF CONSULTATION:  04/12/2020

ENDOCRINOLOGY CONSULTATION



CONSULTING PHYSICIAN:  Kevin Mohr MD.



REFERRING PHYSICIAN:  Geoffrey Trevizo MD.



REASON FOR CONSULTATION:  Diabetes management, DKA.



HISTORY OF PRESENT ILLNESS:  The patient is a 65-year-old female with

history of noncompliance with medications.  She is diabetic, who presented

to the hospital with bilateral weakness.  The patient had glucose of over

500.  The patient was noted to be in DKA and was treated with IV fluid, IV

insulin, and admitted for further observation.  She has history of

hypothyroidism and type 2 diabetes.  The patient stated that she was

diagnosed with diabetes by primary care doctor, however, refused to take

any medication and did not believe the diagnosis was correct.  Also, she

has been on thyroid hormone, I am not sure what dose.  TSH is

suppressed.



PAST MEDICAL HISTORY:

1. Hypothyroidism.

2. Diabetes.



FAMILY HISTORY:  Noncontributory.



SOCIAL HISTORY:  Lives with a roommate.  No smoking, alcohol, or drug

use.



PAST SURGICAL HISTORY:  None.



ALLERGIES TO MEDICATIONS:  None.



REVIEW OF SYSTEMS:  A 12-point review of systems was performed.  Pertinent

positives and negatives are mentioned in history of present illness.



LABORATORY DATA:  Sodium 140, potassium 3.7, chloride 103, bicarb 22, BUN

14, creatinine 0.5, glucose of 241.  Low phosphorus of 1.5.  Vitamin B12

was more than 2000.  TSH of 0.02.



PHYSICAL EXAMINATION:

GENERAL:  The patient is awake.

VITAL SIGNS:  Blood pressure is 131/80, pulse of 108, temperature of 98.2,

respiratory rate of 12.

HEENT:  Pupils are equal and reactive to light.  Sclerae are anicteric.

NECK:  No JVD.

HEART:  Regular.

LUNGS:  Clear.

ABDOMEN:  Positive bowel sounds.  Soft.

EXTREMITIES:  No clubbing, cyanosis, or edema.



DIAGNOSES:

1. DKA.

2. Noncompliance with diabetic regimen.

3. Hypothyroidism.

4. Iatrogenic hyperthyroidism.



DISCUSSION:

1. Continue with Levemir 12 units nightly.

2. Start NovoLog 4 units before each meal.

3. NovoLog sliding scale before meals and at bedtime.

4. Repeat thyroid function, TSH and free T4.

5. Hold off on thyroid hormone replacement for now.



I will follow the patient closely during the hospital stay.



Thank you, Dr. Trevizo, for the courtesy of this consultation.









  ______________________________________________

  Kevin Mohr M.D.





DR:  RN/PSM

D:  04/12/2020 09:58

T:  04/12/2020 18:48

JOB#:  [id number]

CC:



VU

## 2020-04-12 NOTE — INFECTIOUS DISEASES PROG NOTE
Assessment/Plan


Assessment/Plan








Full consult dictated:





A)





1) gram neg uti/pyelonephritis, sepsis, gram neg bacteremia, 


2) dka


3) sdh, right sided weakness





P)





1) meropenem - cover esbl until ID/sensitivities back


2) check urine culture and blood cultures 


3) f/u labs


4) will f/u





Subjective


Allergies:  


Coded Allergies:  


     No Known Allergies (Unverified , 4/15/15)





Objective


Vital Signs





Last 24 Hour Vital Signs








  Date Time  Temp Pulse Resp B/P (MAP) Pulse Ox O2 Delivery O2 Flow Rate FiO2


 


4/12/20 16:00  105      


 


4/12/20 16:00      Room Air  


 


4/12/20 16:00 98.0 103 20 118/59 (78) 97   


 


4/12/20 12:24  98      


 


4/12/20 12:00      Room Air  


 


4/12/20 12:00 98.1 97 19 117/76 (90) 97   


 


4/12/20 08:22  108  131/70    


 


4/12/20 08:00  108      


 


4/12/20 08:00      Room Air  


 


4/12/20 08:00 98.2 108 19 131/70 (90) 96   


 


4/12/20 04:00 97.7 109 20 120/65 (83) 96   


 


4/12/20 04:00  110      


 


4/12/20 04:00      Room Air  


 


4/12/20 00:00      Room Air  


 


4/12/20 00:00  105      


 


4/12/20 00:00 98.4 106 20 121/63 (82) 96   


 


4/11/20 21:00  62  104/62    








Height (Feet):  5


Height (Inches):  4.00


Weight (Pounds):  110





Microbiology








 Date/Time


Source Procedure


Growth Status


 


 


 4/10/20 22:19


Blood Blood Culture - Preliminary Resulted


 


 4/10/20 22:00


Blood Blood Culture - Preliminary Resulted


 


 4/10/20 20:40


Urine,Clean Catch Urine Culture - Preliminary


Gram Negative Jaylen Resulted











Laboratory Tests








Test


  4/12/20


06:24 4/12/20


14:00


 


White Blood Count


  10.5 K/UL


(4.8-10.8) 


 


 


Red Blood Count


  4.33 M/UL


(4.20-5.40) 


 


 


Hemoglobin


  13.4 G/DL


(12.0-16.0) 


 


 


Hematocrit


  37.9 %


(37.0-47.0) 


 


 


Mean Corpuscular Volume 88 FL (80-99)   


 


Mean Corpuscular Hemoglobin


  31.0 PG


(27.0-31.0) 


 


 


Mean Corpuscular Hemoglobin


Concent 35.4 G/DL


(32.0-36.0) 


 


 


Red Cell Distribution Width


  11.9 %


(11.6-14.8) 


 


 


Platelet Count


  60 K/UL


(150-450)  L 


 


 


Mean Platelet Volume


  7.9 FL


(6.5-10.1) 


 


 


Neutrophils (%) (Auto)


  % (45.0-75.0)


  


 


 


Lymphocytes (%) (Auto)


  % (20.0-45.0)


  


 


 


Monocytes (%) (Auto)  % (1.0-10.0)   


 


Eosinophils (%) (Auto)  % (0.0-3.0)   


 


Basophils (%) (Auto)  % (0.0-2.0)   


 


Differential Total Cells


Counted 100  


  


 


 


Neutrophils % (Manual) 64 % (45-75)   


 


Lymphocytes % (Manual) 18 % (20-45)  L 


 


Monocytes % (Manual) 6 % (1-10)   


 


Eosinophils % (Manual) 0 % (0-3)   


 


Basophils % (Manual) 0 % (0-2)   


 


Band Neutrophils 12 % (0-8)  H 


 


Platelet Estimate Decreased  L 


 


Platelet Morphology Normal   


 


Red Blood Cell Morphology Normal   


 


Sodium Level


  168 MMOL/L


(136-145)  *H 


 


 


Potassium Level


  3.7 MMOL/L


(3.5-5.1) 


 


 


Chloride Level


  128 MMOL/L


()  H 


 


 


Carbon Dioxide Level


  23 MMOL/L


(21-32) 


 


 


Anion Gap


  15 mmol/L


(5-15) 


 


 


Blood Urea Nitrogen


  14 mg/dL


(7-18) 


 


 


Creatinine


  0.5 MG/DL


(0.55-1.30)  L 


 


 


Estimat Glomerular Filtration


Rate > 60 mL/min


(>60) 


 


 


Glucose Level


  241 MG/DL


()  H 


 


 


Uric Acid


  3.3 MG/DL


(2.6-7.2) 


 


 


Calcium Level


  8.7 MG/DL


(8.5-10.1) 


 


 


Phosphorus Level


  1.5 MG/DL


(2.5-4.9)  L 


 


 


Magnesium Level


  2.5 MG/DL


(1.8-2.4)  H 


 


 


Total Bilirubin


  0.4 MG/DL


(0.2-1.0) 


 


 


Aspartate Amino Transf


(AST/SGOT) 38 U/L (15-37)


H 


 


 


Alanine Aminotransferase


(ALT/SGPT) 32 U/L (12-78)


  


 


 


Alkaline Phosphatase


  117 U/L


()  H 


 


 


Ammonia


  33 umol/L


(11-32)  H 


 


 


Troponin I


  0.013 ng/mL


(0.000-0.056) 


 


 


C-Reactive Protein,


Quantitative 36.2 mg/dL


(0.00-0.90)  H 


 


 


Pro-B-Type Natriuretic Peptide


  210 pg/mL


(0-125)  H 


 


 


Total Protein


  6.3 G/DL


(6.4-8.2)  L 


 


 


Albumin


  1.8 G/DL


(3.4-5.0)  L 


 


 


Globulin 4.5 g/dL   


 


Albumin/Globulin Ratio


  0.4 (1.0-2.7)


L 


 


 


Triglycerides Level


  122 MG/DL


() 


 


 


Cholesterol Level


  94 MG/DL (<


200) 


 


 


LDL Cholesterol


  48 mg/dL


(<100) 


 


 


HDL Cholesterol


  19 MG/DL


(40-60)  L 


 


 


Cholesterol/HDL Ratio


  4.9 (3.3-4.4)


H 


 


 


Vitamin B12 Level


  > 2000 PG/ML


(193-986)  H 


 


 


Folate


  17.8 NG/ML


(8.6-58.9) 


 


 


Thyroid Stimulating Hormone


(TSH) 0.019 uiU/mL


(0.358-3.740) 


 


 


Free Thyroxine


  1.75 NG/DL


(0.76-1.46)  H 


 


 


Acetone, Qualitative  Positive  











Current Medications








 Medications


  (Trade)  Dose


 Ordered  Sig/Meghan


 Route


 PRN Reason  Start Time


 Stop Time Status Last Admin


Dose Admin


 


 Acetaminophen


  (Tylenol)  650 mg  Q4H  PRN


 ORAL


 Temp >100.5  4/12/20 13:15


 5/11/20 13:14   


 


 


 Acetaminophen


  (Tylenol)  650 mg  Q4H  PRN


 ORAL


 For Pain  4/12/20 14:00


 5/11/20 13:59   


 


 


 Atorvastatin


 Calcium


  (Lipitor)  40 mg  BEDTIME


 ORAL


   4/12/20 21:00


 7/10/20 20:59   


 


 


 Dextrose  1,000 ml @ 


 100 mls/hr  Q10H


 IV


   4/12/20 13:15


 5/12/20 09:59  4/12/20 13:46


 


 


 Dextrose


  (Dextrose 50%)  25 ml  Q30M  PRN


 IV


 Hypoglycemia  4/12/20 13:15


 7/9/20 21:44   


 


 


 Dextrose


  (Dextrose 50%)  50 ml  Q30M  PRN


 IV


 Hypoglycemia  4/12/20 13:15


 7/9/20 21:44   


 


 


 Insulin Aspart


  (NovoLOG)    AC+HS


 SUBQ


   4/12/20 16:30


 7/10/20 09:29  4/12/20 17:21


 


 


 Insulin Aspart


  (NovoLOG)  4 units  NOVOTIAC


 SUBQ


   4/12/20 16:50


 7/11/20 11:49  4/12/20 17:22


 


 


 Insulin Detemir


  (Levemir)  12 units  QHS


 SUBQ


   4/12/20 21:00


 7/11/20 20:59   


 


 


 Levetiracetam  100 ml @ 


 400 mls/hr  Q12HR


 IVPB


   4/12/20 21:00


 7/10/20 10:59   


 


 


 Meropenem 1 gm/


 Sodium Chloride  100 ml @ 


 200 mls/hr  Q12HR


 IVPB


   4/12/20 21:00


 4/17/20 20:59   


 


 


 Metoprolol


 Tartrate


  (Lopressor)  25 mg  Q12HR


 ORAL


   4/12/20 21:00


 7/10/20 20:59   


 


 


 Pantoprazole


  (Protonix)  40 mg  EVERY 12  HOURS


 IVP


   4/12/20 21:00


 5/11/20 12:59   


 


 


 Phosphorus


  (Phospha 250


 Neutral)  250 mg  THREE TIMES A  DAY


 ORAL


   4/12/20 18:00


 5/11/20 12:59  4/12/20 17:19


 


 


 Sodium Citrate


  (Bicitra)  30 ml  EVERY 6  HOURS


 ORAL


   4/12/20 18:00


 5/11/20 12:29  4/12/20 17:19


 


 


 Tramadol HCl


  (Ultram)  25 mg  Q6H  PRN


 ORAL


 Severe Breakthru Pain (>7)  4/12/20 14:00


 4/18/20 13:59   


 

















Brijesh Looney MD Apr 12, 2020 20:30

## 2020-04-12 NOTE — NUR
NURSE NOTES:

Patient received from Min RN from [facility]. Patient stable with RR even and unlabored on RA. No 
complaints and no s/sx of distress. IV fluids and potassium phospate infusing from separate 
peripheral IV lines. Vincent catheter draining well to gravity. Soft BM at this time, brown in 
color. Skin intact. Side rails up x2, call light within reach, bed low and locked. Will 
continue to monitor.

## 2020-04-12 NOTE — NUR
NURSE NOTES:

Received pt and report from DEREK López. Observed pt resting in bed with both eyes open. Pt is 
A/Ox2. Cardiac monitor is in placed; pt is ST ( bpm). IV site intact, asymptomatic, 
and patent; running D5W @100cc/hr. Bed is in the lowest position and locked. Call light and 
bedside table is within reach. No signs/symptoms of acute distress noted at this time. Will 
continue plan of care.

## 2020-04-12 NOTE — NEUROLOGY PROGRESS NOTE
Interim History


Interim History


ROS Limited/Unobtainable:  No


Interim History


still confused, NA lower





Objective


Physical Exam





Last Vital Signs








  Date Time  Temp Pulse Resp B/P (MAP) Pulse Ox O2 Delivery O2 Flow Rate FiO2


 


4/12/20 16:00  105      


 


4/12/20 16:00      Room Air  


 


4/12/20 16:00 98.0  20 118/59 (78) 97   











Laboratory Tests








Test


  4/12/20


06:24 4/12/20


14:00


 


White Blood Count


  10.5 K/UL


(4.8-10.8) 


 


 


Red Blood Count


  4.33 M/UL


(4.20-5.40) 


 


 


Hemoglobin


  13.4 G/DL


(12.0-16.0) 


 


 


Hematocrit


  37.9 %


(37.0-47.0) 


 


 


Mean Corpuscular Volume 88 FL (80-99)   


 


Mean Corpuscular Hemoglobin


  31.0 PG


(27.0-31.0) 


 


 


Mean Corpuscular Hemoglobin


Concent 35.4 G/DL


(32.0-36.0) 


 


 


Red Cell Distribution Width


  11.9 %


(11.6-14.8) 


 


 


Platelet Count


  60 K/UL


(150-450)  L 


 


 


Mean Platelet Volume


  7.9 FL


(6.5-10.1) 


 


 


Neutrophils (%) (Auto)


  % (45.0-75.0)


  


 


 


Lymphocytes (%) (Auto)


  % (20.0-45.0)


  


 


 


Monocytes (%) (Auto)  % (1.0-10.0)   


 


Eosinophils (%) (Auto)  % (0.0-3.0)   


 


Basophils (%) (Auto)  % (0.0-2.0)   


 


Differential Total Cells


Counted 100  


  


 


 


Neutrophils % (Manual) 64 % (45-75)   


 


Lymphocytes % (Manual) 18 % (20-45)  L 


 


Monocytes % (Manual) 6 % (1-10)   


 


Eosinophils % (Manual) 0 % (0-3)   


 


Basophils % (Manual) 0 % (0-2)   


 


Band Neutrophils 12 % (0-8)  H 


 


Platelet Estimate Decreased  L 


 


Platelet Morphology Normal   


 


Red Blood Cell Morphology Normal   


 


Sodium Level


  168 MMOL/L


(136-145)  *H 


 


 


Potassium Level


  3.7 MMOL/L


(3.5-5.1) 


 


 


Chloride Level


  128 MMOL/L


()  H 


 


 


Carbon Dioxide Level


  23 MMOL/L


(21-32) 


 


 


Anion Gap


  15 mmol/L


(5-15) 


 


 


Blood Urea Nitrogen


  14 mg/dL


(7-18) 


 


 


Creatinine


  0.5 MG/DL


(0.55-1.30)  L 


 


 


Estimat Glomerular Filtration


Rate > 60 mL/min


(>60) 


 


 


Glucose Level


  241 MG/DL


()  H 


 


 


Uric Acid


  3.3 MG/DL


(2.6-7.2) 


 


 


Calcium Level


  8.7 MG/DL


(8.5-10.1) 


 


 


Phosphorus Level


  1.5 MG/DL


(2.5-4.9)  L 


 


 


Magnesium Level


  2.5 MG/DL


(1.8-2.4)  H 


 


 


Total Bilirubin


  0.4 MG/DL


(0.2-1.0) 


 


 


Aspartate Amino Transf


(AST/SGOT) 38 U/L (15-37)


H 


 


 


Alanine Aminotransferase


(ALT/SGPT) 32 U/L (12-78)


  


 


 


Alkaline Phosphatase


  117 U/L


()  H 


 


 


Ammonia


  33 umol/L


(11-32)  H 


 


 


Troponin I


  0.013 ng/mL


(0.000-0.056) 


 


 


C-Reactive Protein,


Quantitative 36.2 mg/dL


(0.00-0.90)  H 


 


 


Pro-B-Type Natriuretic Peptide


  210 pg/mL


(0-125)  H 


 


 


Total Protein


  6.3 G/DL


(6.4-8.2)  L 


 


 


Albumin


  1.8 G/DL


(3.4-5.0)  L 


 


 


Globulin 4.5 g/dL   


 


Albumin/Globulin Ratio


  0.4 (1.0-2.7)


L 


 


 


Triglycerides Level


  122 MG/DL


() 


 


 


Cholesterol Level


  94 MG/DL (<


200) 


 


 


LDL Cholesterol


  48 mg/dL


(<100) 


 


 


HDL Cholesterol


  19 MG/DL


(40-60)  L 


 


 


Cholesterol/HDL Ratio


  4.9 (3.3-4.4)


H 


 


 


Vitamin B12 Level


  > 2000 PG/ML


(193-986)  H 


 


 


Folate


  17.8 NG/ML


(8.6-58.9) 


 


 


Thyroid Stimulating Hormone


(TSH) 0.019 uiU/mL


(0.358-3.740) 


 


 


Free Thyroxine


  1.75 NG/DL


(0.76-1.46)  H 


 


 


Acetone, Qualitative  Positive  








Head:  normocophalic


Neck:  no rigidity


EENT:  benign





Neurologic Exam


Mental Status:  awake


Speech:  normal speech


Language:  normal language


Cranial Nerve VII:  no facial asymmetry


Objective


somnolent, wakes up.


Oriented  x 2


Non focal


cc 35 min





Impression/Recommendations


Problems:  


(1) STEMI (ST elevation myocardial infarction)


(2) Atrial fibrillation with rapid ventricular response


(3) Prolonged Q-T interval on ECG


(4) DKA (diabetic ketoacidoses)


(5) Hypernatremia


(6) UTI (urinary tract infection)


(7) Subdural hematoma


Diagnostic Impression


Repeat ct brain this am stable from prior.


cont keppra ppx 500 mg bid - if too sedating will lower to 250


SBP < 150


Speech eval pending


PT Osmin Stephens MD Apr 12, 2020 20:36

## 2020-04-12 NOTE — NUR
NURSE NOTES:

Asleep but easily awakened to touch. has diarrhea x4. sponge bath provided. no N/V or 
headache. keep pt comfortable.

## 2020-04-12 NOTE — GENERAL PROGRESS NOTE
Assessment/Plan


Assessment/Plan:


65-year-old female with PMH of medical noncompliance, DM type II who presents 

with b/l weakness, N/V, GLF, on admission pt found to be in DKA with 's.





#DKA - improved


#UTI


#Uncontrolled DM, Hgb A1C 10.4


#GN bo bacteremia 2/2


-continue in-patient medical care


-AG 21-> 14


-CT abd reviewed


-BCx w/GNR 2/2


-UCx GNR


-accuchecks, ISS qAC/HS


-start levemir 12 U qHS


-educated pt on diabetic compliance today


-repeat BCx ordered


-ID, Dr. Looney: CTX


-Endo consulted





#Right sided weakness


#SDH 3mm no midline shift


-pt AOx3, pt noted fall 3 days ago


-found on CT head


-repeat CT head stable


-keppra BID


-fall precautions


-PT treat and eval


-Neurology consulted, Dr. Cheney, recs aprpeciated





#Elevated d-dimer


#Tachycardia


-can be multifactorial given infection, however concern for PE, PE ruled out


-EKG w/NSR


-CTA thorax negative for PE


-likely 2/2 dehydration, encourage PO intake





#Hypernatremia


#Hypokalemia


#Hypophosphatemia


-Hypernatremia likely 2/2 free water deficit


-replace electrolytes, cont. to monitor and replace PRN


-Nephro consulted, IVF per nephro





DVT - SCDs for now given SDH





I spent 35 minutes on this patient's case, and 50% was dedicated to counseling 

and/or care coordination witch included communication with RN, consulting MDs.





Subjective


Allergies:  


Coded Allergies:  


     No Known Allergies (Unverified , 4/15/15)


Subjective


F/u DKA, UTI, hypernatremia, SDH.


Na worsening, d-dimer elevated. Pt w/no SOB at this time.





Objective





Last 24 Hour Vital Signs








  Date Time  Temp Pulse Resp B/P (MAP) Pulse Ox O2 Delivery O2 Flow Rate FiO2


 


4/12/20 08:22  108  131/70    


 


4/12/20 08:00  108      


 


4/12/20 08:00 98.2 108 19 131/70 (90) 96   


 


4/12/20 04:00 97.7 109 20 120/65 (83) 96   


 


4/12/20 04:00  110      


 


4/12/20 04:00      Room Air  


 


4/12/20 00:00      Room Air  


 


4/12/20 00:00  105      


 


4/12/20 00:00 98.4 106 20 121/63 (82) 96   


 


4/11/20 21:00  62  104/62    


 


4/11/20 20:00 98.6 102 20 104/62 (76) 96   


 


4/11/20 20:00  101      


 


4/11/20 20:00      Room Air  


 


4/11/20 16:00  107      


 


4/11/20 16:00      Room Air  


 


4/11/20 14:00  104  113/54    


 


4/11/20 12:00      Room Air  


 


4/11/20 12:00  95      

















Intake and Output  


 


 4/11/20 4/12/20





 19:00 07:00


 


Intake Total 2101.664 ml 20 ml


 


Output Total 1700 ml 130 ml


 


Balance 401.664 ml -110 ml


 


  


 


Intake Oral 100 ml 20 ml


 


IV Total 2001.664 ml 


 


Output Urine Total 1700 ml 130 ml


 


# Bowel Movements  5








Laboratory Tests


4/11/20 10:26: 


White Blood Count 10.2, Red Blood Count 5.24, Hemoglobin 16.3H, Hematocrit 46.6

, Mean Corpuscular Volume 89, Mean Corpuscular Hemoglobin 31.2H, Mean 

Corpuscular Hemoglobin Concent 35.0, Red Cell Distribution Width 12.1, Platelet 

Count 78L, Mean Platelet Volume 8.1, Neutrophils (%) (Auto) , Lymphocytes (%) (

Auto) , Monocytes (%) (Auto) , Eosinophils (%) (Auto) , Basophils (%) (Auto) , 

Differential Total Cells Counted 100, Neutrophils % (Manual) 88H, Lymphocytes % 

(Manual) 5L, Monocytes % (Manual) 5, Eosinophils % (Manual) 1, Basophils % (

Manual) 1, Band Neutrophils 0, Platelet Estimate DecreasedL, Platelet 

Morphology Normal, Red Blood Cell Morphology Normal, Sodium Level 161*H, 

Potassium Level 3.1L, Chloride Level 122H, Carbon Dioxide Level 14L, Anion Gap 

24H, Blood Urea Nitrogen 14, Creatinine 0.7, Estimat Glomerular Filtration Rate 

> 60, Glucose Level 345H, Calcium Level 9.3, Phosphorus Level 1.7L


4/11/20 13:10: 


Urine Random Sodium 61, Urine Opiates Screen Negative, Urine Barbiturates 

Screen Negative, Phencyclidine (PCP) Screen Negative, Urine Amphetamines Screen 

Negative, Urine Benzodiazepines Screen Negative, Urine Cocaine Screen Negative, 

Urine Marijuana (THC) Screen Negative


4/11/20 15:30: 


Sodium Level 162*H, Potassium Level 3.9, Chloride Level 128H, Carbon Dioxide 

Level 17L, Anion Gap 17H, Blood Urea Nitrogen 13, Creatinine 0.5L, Estimat 

Glomerular Filtration Rate > 60, Glucose Level 281H, Calcium Level 9.1


4/12/20 06:24: 


White Blood Count 10.5, Red Blood Count 4.33, Hemoglobin 13.4, Hematocrit 37.9, 

Mean Corpuscular Volume 88, Mean Corpuscular Hemoglobin 31.0, Mean Corpuscular 

Hemoglobin Concent 35.4, Red Cell Distribution Width 11.9, Platelet Count 60L, 

Mean Platelet Volume 7.9, Neutrophils (%) (Auto) , Lymphocytes (%) (Auto) , 

Monocytes (%) (Auto) , Eosinophils (%) (Auto) , Basophils (%) (Auto) , 

Neutrophils % (Manual) [Pending], Lymphocytes % (Manual) [Pending], Platelet 

Estimate [Pending], Platelet Morphology [Pending], Sodium Level 168*H, 

Potassium Level 3.7, Chloride Level 128H, Carbon Dioxide Level 23, Anion Gap 15

, Blood Urea Nitrogen 14, Creatinine 0.5L, Estimat Glomerular Filtration Rate > 

60, Glucose Level 241H, Calcium Level 8.7, Phosphorus Level 1.5L, Uric Acid 3.3

, Magnesium Level 2.5H, Total Bilirubin 0.4, Aspartate Amino Transf (AST/SGOT) 

38H, Alanine Aminotransferase (ALT/SGPT) 32, Alkaline Phosphatase 117H, Ammonia 

33H, Troponin I 0.013, C-Reactive Protein, Quantitative 36.2H, Pro-B-Type 

Natriuretic Peptide 210H, Total Protein 6.3L, Albumin 1.8L, Globulin 4.5, 

Albumin/Globulin Ratio 0.4L, Triglycerides Level 122, Cholesterol Level 94, LDL 

Cholesterol 48, HDL Cholesterol 19L, Cholesterol/HDL Ratio 4.9H, Vitamin B12 

Level > 2000H, Folate 17.8, Thyroid Stimulating Hormone (TSH) 0.020L


Height (Feet):  5


Height (Inches):  4.00


Weight (Pounds):  110


Objective


General: NAD, laying in bed comfortably


HEENT: NCAT, EOMi, mucous membranes moist


CV: RRR, no murmurs, rubs, or gallops


Pulm: CTAB, No wheezes, rhonchi, or rales, no accessory muscle usage


GI: Soft, nontender, nondistended, bowel sounds present


Ext: No lower extremity edema bilaterally











Anitha Ochoa M.D. Apr 12, 2020 09:28

## 2020-04-12 NOTE — NEPHROLOGY PROGRESS NOTE
Assessment/Plan


Problem List:  


(1) Hypernatremia


(2) UTI (urinary tract infection)


(3) Subdural hematoma


(4) DKA (diabetic ketoacidoses)


Assessment





Hypernatremia most likely due to free water deficit


Hypokalemia, hypophosphatemia


Hyperglycemia and DKA


Evidence of UTI


Right-sided weakness with 3 mm subdural hematoma found in CT scan


Plan





Hypotonic IV solution, IV was changed to D5W


Potassium and phosphorus and magnesium supplement as needed


Diet started as the patient is more alert


Low dose of Lopressor


Monitor renal parameters and electrolytes


Neuro eval noted


Keep the blood pressure and blood sugar in check


Urine studies


Per orders





Subjective


ROS Limited/Unobtainable:  No


Constitutional:  Reports: malaise





Objective


Objective





Last 24 Hour Vital Signs








  Date Time  Temp Pulse Resp B/P (MAP) Pulse Ox O2 Delivery O2 Flow Rate FiO2


 


4/12/20 12:24  98      


 


4/12/20 08:22  108  131/70    


 


4/12/20 08:00  108      


 


4/12/20 08:00      Room Air  


 


4/12/20 08:00 98.2 108 19 131/70 (90) 96   


 


4/12/20 04:00 97.7 109 20 120/65 (83) 96   


 


4/12/20 04:00  110      


 


4/12/20 04:00      Room Air  


 


4/12/20 00:00      Room Air  


 


4/12/20 00:00  105      


 


4/12/20 00:00 98.4 106 20 121/63 (82) 96   


 


4/11/20 21:00  62  104/62    


 


4/11/20 20:00 98.6 102 20 104/62 (76) 96   


 


4/11/20 20:00  101      


 


4/11/20 20:00      Room Air  


 


4/11/20 16:00  107      


 


4/11/20 16:00      Room Air  


 


4/11/20 14:00  104  113/54    

















Intake and Output  


 


 4/11/20 4/12/20





 19:00 07:00


 


Intake Total 2101.664 ml 20 ml


 


Output Total 1700 ml 130 ml


 


Balance 401.664 ml -110 ml


 


  


 


Intake Oral 100 ml 20 ml


 


IV Total 2001.664 ml 


 


Output Urine Total 1700 ml 130 ml


 


# Bowel Movements  5








Laboratory Tests


4/11/20 13:10: 


Urine Random Sodium 61, Urine Opiates Screen Negative, Urine Barbiturates 

Screen Negative, Phencyclidine (PCP) Screen Negative, Urine Amphetamines Screen 

Negative, Urine Benzodiazepines Screen Negative, Urine Cocaine Screen Negative, 

Urine Marijuana (THC) Screen Negative


4/11/20 15:30: 


Sodium Level 162*H, Potassium Level 3.9, Chloride Level 128H, Carbon Dioxide 

Level 17L, Anion Gap 17H, Blood Urea Nitrogen 13, Creatinine 0.5L, Estimat 

Glomerular Filtration Rate > 60, Glucose Level 281H, Calcium Level 9.1


4/12/20 06:24: 


Sodium Level 168*H, Potassium Level 3.7, Chloride Level 128H, Carbon Dioxide 

Level 23, Anion Gap 15, Blood Urea Nitrogen 14, Creatinine 0.5L, Estimat 

Glomerular Filtration Rate > 60, Glucose Level 241H, Calcium Level 8.7, White 

Blood Count 10.5, Red Blood Count 4.33, Hemoglobin 13.4, Hematocrit 37.9, Mean 

Corpuscular Volume 88, Mean Corpuscular Hemoglobin 31.0, Mean Corpuscular 

Hemoglobin Concent 35.4, Red Cell Distribution Width 11.9, Platelet Count 60L, 

Mean Platelet Volume 7.9, Neutrophils (%) (Auto) , Lymphocytes (%) (Auto) , 

Monocytes (%) (Auto) , Eosinophils (%) (Auto) , Basophils (%) (Auto) , 

Differential Total Cells Counted 100, Neutrophils % (Manual) 64, Lymphocytes % (

Manual) 18L, Monocytes % (Manual) 6, Eosinophils % (Manual) 0, Basophils % (

Manual) 0, Band Neutrophils 12H, Platelet Estimate DecreasedL, Platelet 

Morphology Normal, Red Blood Cell Morphology Normal, Uric Acid 3.3, Phosphorus 

Level 1.5L, Magnesium Level 2.5H, Total Bilirubin 0.4, Aspartate Amino Transf (

AST/SGOT) 38H, Alanine Aminotransferase (ALT/SGPT) 32, Alkaline Phosphatase 117H

, Ammonia 33H, Troponin I 0.013, C-Reactive Protein, Quantitative 36.2H, Pro-B-

Type Natriuretic Peptide 210H, Total Protein 6.3L, Albumin 1.8L, Globulin 4.5, 

Albumin/Globulin Ratio 0.4L, Triglycerides Level 122, Cholesterol Level 94, LDL 

Cholesterol 48, HDL Cholesterol 19L, Cholesterol/HDL Ratio 4.9H, Vitamin B12 

Level > 2000H, Folate 17.8, Thyroid Stimulating Hormone (TSH) 0.019L, Free 

Thyroxine 1.75H


Height (Feet):  5


Height (Inches):  4.00


Weight (Pounds):  110


General Appearance:  no apparent distress


Cardiovascular:  tachycardia, gallop/S3


Objective


No change











Jonathan Jacobson MD Apr 12, 2020 12:51

## 2020-04-12 NOTE — NUR
NURSE NOTES:

Received report from Barbara REED. Pt in bed awake and orientedx3 and able to make needs known. 
Denied pain. Denied SOB on room air, Sating 98%. IV site in left hand 22G running with 1/2NS 
w/KCL 40mEq @150ml/hr, RAC 20G SL patent and asymptomatic. Side rail paddedx3 up for safety. 
Bed in lowest position and locked. Call light within easy reach. Vincent cath intact and 
patent. Sinus rhythm on  monitor. Reinforced NPO. Will continue plan of care,

## 2020-04-12 NOTE — DIAGNOSTIC IMAGING REPORT
EXAM:

  CT Angiography Chest With Intravenous Contrast

 

CLINICAL HISTORY:

  PE

 

TECHNIQUE:

  Axial computed tomographic angiography images of the chest with 

intravenous contrast.  CTDI is 29.6 mGy and DLP is 138.3 mGy-cm.  One or 

more of the following dose reduction techniques were used: automated 

exposure control, adjustment of the mA and/or kV according to patient 

size, use of iterative reconstruction technique.

  MIP reconstructed images were created and reviewed.

  Coronal and sagittal reformatted images were created and reviewed.

 

COMPARISON:

  Chest x-ray 4/15/15 CT abdomen and pelvis 4/10/20

 

FINDINGS:

  Pulmonary arteries:  No pulmonary embolus, somewhat limited at the 

bases due to motion.

  Aorta:  Atherosclerotic vascular disease.  No thoracic aortic aneurysm.

  Lungs:  Bibasilar lung atelectasis/airspace disease.  3 mm nodule along 

the right middle lobe fissure. 3.6 mm nodule left lower lobe along the 

fissure.

  Pleural space:  Unremarkable.  No significant effusion.  No 

pneumothorax.

  Heart:  Unremarkable.  No cardiomegaly.  No significant pericardial 

effusion.  No evidence of RV dysfunction.

  Bones/joints:  No acute fracture.  No dislocation.

  Soft tissues:  Unremarkable.

  Lymph nodes:  Unremarkable.  No enlarged lymph nodes.

  Liver:  Prominent left lobe of the liver is slightly nodular in 

appearance.

  Gallbladder and bile ducts:  Distended gallbladder with hyperdensity 

may be sludge or stones.

  Kidneys and ureters:  Mild bilateral perinephric stranding.

 

IMPRESSION:     

1.  No pulmonary embolus, somewhat limited at the bases due to motion.

2.  Bibasilar lung atelectasis/airspace disease.

3.  3 mm nodule along the right middle lobe fissure. 3.6 mm nodule left 

lower lobe along the fissure.  Fleischner Society Guidelines for low-risk 

patients, no follow-up is necessary.  For high-risk patients (smoking 

history or other known risk factors) an optional chest CT at 12 months 

could be performed.

4.  Mild bilateral perinephric stranding.  May be senescent, correlate 

for infection.

5.  Distended gallbladder with hyperdensity may be sludge or stones.

6.  Prominent left lobe of the liver is slightly nodular in appearance.

## 2020-04-13 VITALS — SYSTOLIC BLOOD PRESSURE: 114 MMHG | DIASTOLIC BLOOD PRESSURE: 57 MMHG

## 2020-04-13 VITALS — DIASTOLIC BLOOD PRESSURE: 72 MMHG | SYSTOLIC BLOOD PRESSURE: 119 MMHG

## 2020-04-13 VITALS — SYSTOLIC BLOOD PRESSURE: 131 MMHG | DIASTOLIC BLOOD PRESSURE: 63 MMHG

## 2020-04-13 VITALS — SYSTOLIC BLOOD PRESSURE: 125 MMHG | DIASTOLIC BLOOD PRESSURE: 56 MMHG

## 2020-04-13 VITALS — DIASTOLIC BLOOD PRESSURE: 58 MMHG | SYSTOLIC BLOOD PRESSURE: 112 MMHG

## 2020-04-13 VITALS — SYSTOLIC BLOOD PRESSURE: 110 MMHG | DIASTOLIC BLOOD PRESSURE: 62 MMHG

## 2020-04-13 LAB
ADD MANUAL DIFF: YES
ALBUMIN SERPL-MCNC: 1.6 G/DL (ref 3.4–5)
ALBUMIN/GLOB SERPL: 0.4 {RATIO} (ref 1–2.7)
ALP SERPL-CCNC: 109 U/L (ref 46–116)
ALT SERPL-CCNC: 26 U/L (ref 12–78)
ANION GAP SERPL CALC-SCNC: 8 MMOL/L (ref 5–15)
AST SERPL-CCNC: 33 U/L (ref 15–37)
BILIRUB SERPL-MCNC: 0.3 MG/DL (ref 0.2–1)
BUN SERPL-MCNC: 15 MG/DL (ref 7–18)
CALCIUM SERPL-MCNC: 7.9 MG/DL (ref 8.5–10.1)
CHLORIDE SERPL-SCNC: 120 MMOL/L (ref 98–107)
CO2 SERPL-SCNC: 34 MMOL/L (ref 21–32)
CREAT SERPL-MCNC: 0.6 MG/DL (ref 0.55–1.3)
ERYTHROCYTE [DISTWIDTH] IN BLOOD BY AUTOMATED COUNT: 12.1 % (ref 11.6–14.8)
GLOBULIN SER-MCNC: 4.3 G/DL
HCT VFR BLD CALC: 34.7 % (ref 37–47)
HGB BLD-MCNC: 12.5 G/DL (ref 12–16)
LDH SERPL L TO P-CCNC: 356 U/L (ref 81–234)
MCV RBC AUTO: 88 FL (ref 80–99)
PHOSPHATE SERPL-MCNC: 1.6 MG/DL (ref 2.5–4.9)
PLATELET # BLD: 56 K/UL (ref 150–450)
POTASSIUM SERPL-SCNC: 2.4 MMOL/L (ref 3.5–5.1)
RBC # BLD AUTO: 3.95 M/UL (ref 4.2–5.4)
SODIUM SERPL-SCNC: 161 MMOL/L (ref 136–145)
WBC # BLD AUTO: 8 K/UL (ref 4.8–10.8)

## 2020-04-13 RX ADMIN — INSULIN ASPART SCH UNITS: 100 INJECTION, SOLUTION INTRAVENOUS; SUBCUTANEOUS at 16:50

## 2020-04-13 RX ADMIN — INSULIN DETEMIR SCH UNITS: 100 INJECTION, SOLUTION SUBCUTANEOUS at 21:00

## 2020-04-13 RX ADMIN — INSULIN ASPART SCH UNITS: 100 INJECTION, SOLUTION INTRAVENOUS; SUBCUTANEOUS at 14:18

## 2020-04-13 RX ADMIN — INSULIN ASPART SCH UNITS: 100 INJECTION, SOLUTION INTRAVENOUS; SUBCUTANEOUS at 18:50

## 2020-04-13 RX ADMIN — INSULIN ASPART SCH UNITS: 100 INJECTION, SOLUTION INTRAVENOUS; SUBCUTANEOUS at 06:40

## 2020-04-13 RX ADMIN — DIBASIC SODIUM PHOSPHATE, MONOBASIC POTASSIUM PHOSPHATE AND MONOBASIC SODIUM PHOSPHATE SCH MG: 852; 155; 130 TABLET ORAL at 14:23

## 2020-04-13 RX ADMIN — ATORVASTATIN CALCIUM SCH MG: 20 TABLET, FILM COATED ORAL at 22:19

## 2020-04-13 RX ADMIN — INSULIN ASPART SCH UNITS: 100 INJECTION, SOLUTION INTRAVENOUS; SUBCUTANEOUS at 21:00

## 2020-04-13 RX ADMIN — LEVETIRACETAM SCH MLS/HR: 5 INJECTION INTRAVENOUS at 09:59

## 2020-04-13 RX ADMIN — DIBASIC SODIUM PHOSPHATE, MONOBASIC POTASSIUM PHOSPHATE AND MONOBASIC SODIUM PHOSPHATE SCH MG: 852; 155; 130 TABLET ORAL at 18:53

## 2020-04-13 RX ADMIN — INSULIN ASPART SCH UNITS: 100 INJECTION, SOLUTION INTRAVENOUS; SUBCUTANEOUS at 06:39

## 2020-04-13 RX ADMIN — Medication SCH MLS/HR: at 11:34

## 2020-04-13 RX ADMIN — Medication SCH MLS/HR: at 18:51

## 2020-04-13 RX ADMIN — DIBASIC SODIUM PHOSPHATE, MONOBASIC POTASSIUM PHOSPHATE AND MONOBASIC SODIUM PHOSPHATE SCH MG: 852; 155; 130 TABLET ORAL at 10:03

## 2020-04-13 RX ADMIN — SODIUM CITRATE AND CITRIC ACID MONOHYDRATE SCH ML: 500; 334 SOLUTION ORAL at 06:37

## 2020-04-13 RX ADMIN — LEVETIRACETAM SCH MLS/HR: 5 INJECTION INTRAVENOUS at 22:17

## 2020-04-13 RX ADMIN — PANTOPRAZOLE SODIUM SCH MG: 40 INJECTION, POWDER, FOR SOLUTION INTRAVENOUS at 10:03

## 2020-04-13 NOTE — GENERAL PROGRESS NOTE
Assessment/Plan


Assessment/Plan:


65-year-old female with PMH of medical noncompliance, DM type II who presents 

with b/l weakness, N/V, GLF, on admission pt found to be in DKA with 's.





#DKA - improved


#UTI


#Uncontrolled DM, Hgb A1C 10.4


#GN bo bacteremia 2/2


-continue in-patient medical care


-AG 21-> 14


-CT abd reviewed


-BCx w/GNR 2/2


-UCx GNR


-accuchecks, ISS qAC/HS


-levemir 16 U qHS, Novolog 8 U qAC


-repeat BCx ordered


-ID, Dr. Looney: CTX


-Endo consulted, recs appreciated





#Right sided weakness


#SDH 3mm no midline shift


-pt AOx3, pt noted fall 3 days ago


-found on CT head, repeat CT head stable


-keppra BID


-fall precautions


-PT treat and eval


-Neurology consulted, Dr. Cheney, recs aprpeciated


-CM for d/c planning





#Elevated d-dimer


#Tachycardia


-can be multifactorial given infection, however concern for PE, PE ruled out


-EKG w/NSR


-CTA thorax negative for PE


-likely 2/2 dehydration, encourage PO intake





#Hypernatremia


#Hypokalemia


#Hypophosphatemia


-Hypernatremia likely 2/2 free water deficit


-replace electrolytes, cont. to monitor and replace PRN


-d/w nephro, cont. bruner for accurate I's and O's


-Nephro consulted, IVF and electrolytes per nephro





DVT - SCDs for now given SDH





I spent 35 minutes on this patient's case, and 50% was dedicated to counseling 

and/or care coordination witch included communication with RN, consulting MDs.





Subjective


Constitutional:  Denies: no symptoms, chills, diaphoresis, fever, malaise, 

weakness, other


HEENT:  Denies: no symptoms, eye pain, blurred vision, tearing, double vision, 

ear pain, ear discharge, nose pain, nose congestion, throat pain, throat 

swelling, mouth pain, mouth swelling, other


Cardiovascular:  Denies: no symptoms, chest pain, edema, irregular heart rate, 

lightheadedness, palpitations, syncope, other


Respiratory:  Denies: no symptoms, cough, orthopnea, shortness of breath, SOB 

with excertion, SOB at rest, sputum, stridor, wheezing, other


Gastrointestinal/Abdominal:  Denies: no symptoms, abdomen distended, abdominal 

pain, black stools, tarry stools, blood in stool, constipated, diarrhea, 

difficulty swallowing, nausea, poor appetite, poor fluid intake, rectal bleeding

, vomiting, other


Genitourinary:  Denies: no symptoms, burning, discharge, frequency, flank pain, 

hematuria, incontinence, pain, urgency, other


Allergies:  


Coded Allergies:  


     No Known Allergies (Unverified , 4/15/15)


Subjective


F/u DKA, UTI, hypernatremia, SDH.


Na remains elevated, potassium low, replaced by nephro.


Pt awake, alert, speaks both Luxembourgish and Sammarinese, had Sammarinese interpretor at 

bedside.


Patient wants Bruner d/c, denies any HA, weakness, S OB, CP, palpitations at 

this time.





Objective





Last 24 Hour Vital Signs








  Date Time  Temp Pulse Resp B/P (MAP) Pulse Ox O2 Delivery O2 Flow Rate FiO2


 


4/13/20 04:00 96.8 101 19 125/56 (79) 98   


 


4/13/20 04:00  101      


 


4/13/20 00:00 97.7 101 19 112/58 (76) 98   


 


4/13/20 00:00  101      


 


4/12/20 20:57  107  116/57    


 


4/12/20 20:00  106      


 


4/12/20 20:00 97.0 107 20 116/57 (76) 98   


 


4/12/20 16:00  105      


 


4/12/20 16:00      Room Air  


 


4/12/20 16:00 98.0 103 20 118/59 (78) 97   


 


4/12/20 12:24  98      


 


4/12/20 12:00      Room Air  


 


4/12/20 12:00 98.1 97 19 117/76 (90) 97   

















Intake and Output  


 


 4/12/20 4/13/20





 19:00 07:00


 


Intake Total 2277.888 ml 


 


Output Total  1400 ml


 


Balance 2277.888 ml -1400 ml


 


  


 


Intake Oral 234 ml 


 


IV Total 2043.888 ml 


 


Output Urine Total  1400 ml


 


# Voids 1 


 


# Bowel Movements 2 








Laboratory Tests


4/12/20 14:00: Acetone, Qualitative Positive


4/13/20 07:05: 


White Blood Count 8.0, Red Blood Count 3.95L, Hemoglobin 12.5, Hematocrit 34.7L

, Mean Corpuscular Volume 88, Mean Corpuscular Hemoglobin 31.7H, Mean 

Corpuscular Hemoglobin Concent 36.1H, Red Cell Distribution Width 12.1, 

Platelet Count 56L, Mean Platelet Volume 8.1, Neutrophils (%) (Auto) , 

Lymphocytes (%) (Auto) , Monocytes (%) (Auto) , Eosinophils (%) (Auto) , 

Basophils (%) (Auto) , Neutrophils % (Manual) [Pending], Lymphocytes % (Manual) 

[Pending], Platelet Estimate [Pending], Platelet Morphology [Pending], D-Dimer [

Pending], Sodium Level 161*H, Potassium Level 2.4*L, Chloride Level 120H, 

Carbon Dioxide Level 34H, Anion Gap 8, Blood Urea Nitrogen 15, Creatinine 0.6, 

Estimat Glomerular Filtration Rate > 60, Glucose Level 275H, Uric Acid 1.9L, 

Calcium Level 7.9L, Phosphorus Level 1.6L, Magnesium Level 2.1, Total Bilirubin 

0.3, Aspartate Amino Transf (AST/SGOT) 33, Alanine Aminotransferase (ALT/SGPT) 

26, Alkaline Phosphatase 109, Lactate Dehydrogenase 356H, C-Reactive Protein, 

Quantitative [Pending], Total Protein 5.9L, Albumin 1.6L, Globulin 4.3, Albumin/

Globulin Ratio 0.4L


Height (Feet):  5


Height (Inches):  4.00


Weight (Pounds):  110


Objective


General: NAD, laying in bed comfortably, AAO x3


HEENT: NCAT, EOMi, mucous membranes moist


CV: RRR, no murmurs, rubs, or gallops


Pulm: CTAB, No wheezes, rhonchi, or rales, no accessory muscle usage


GI: Soft, nontender, nondistended, bowel sounds present


Ext: No lower extremity edema bilaterally


: Bruner in place, draining yellow urine


Neuro: CN II-XII grossly intact, sensation intact bilaterally, MS 5/5 in UE/LE B

/L











Anitha Ochoa M.D. Apr 13, 2020 09:11

## 2020-04-13 NOTE — NUR
NURSE NOTES:

pt in bed awake Chinese speaker.  pt on cardiac monitor no signs of cardiac or respiratory 
distress at this time.  Call light within reach, bed in lowest position and locked, side 
rails up x2, bed alarm on.  pt has bruner for retention.  Will continue to monitor pt.

## 2020-04-13 NOTE — CONSULTATION
DATE OF CONSULTATION:  04/12/2020

INFECTIOUS DISEASES CONSULTATION



CONSULTING PHYSICIAN:  Brijesh Looney MD.



ATTENDING PHYSICIAN:  Geoffrey Trevizo MD.



REFERRING PHYSICIAN:  Celine Ochoa MD.



REASON FOR CONSULTATION:  Gram-negative UTI and pyelonephritis with

gram-negative bacteremia and sepsis.



CHIEF COMPLAINT:  The patient's chief complaint coming in to the hospital

is DKA and hypernatremia.



HISTORY OF PRESENT ILLNESS:  This is a 65-year-old female who is not a very

good historian.  The patient presents to [facility] with

hypernatremia.  Her sodium was 168.  She also had hyperglycemia.  Glucose

was 423.  Workup shows that she has gram-negative UTI and pyelonephritis

with perinephric stranding on CT scan with gram-negative bacteremia and

gram-negative sepsis.  Infectious Disease consultation was requested.  The

patient is currently on Rocephin.  Because of the bacteremia and

prevalence of ESBL organisms, we will change to meropenem.



REVIEW OF SYSTEMS:  CONSTITUTIONAL:  The patient is responsive, but not a

very good historian.  She is limited because she speaks Occitan I believe.

In communication with nursing who translated, she has some weakness.  She

has no fevers currently on her vital signs.  HEAD AND NECK:  No head pain

or neck pain.  CARDIAC:  No chest pain.  No pressors.  PULMONARY:  No

significant shortness of breath, cough, or congestion.  GASTROINTESTINAL:

No nausea or vomiting.  She has no diarrhea.  GENITOURINARY:  She has some

diarrhea.  She has no Vincent.  NEUROLOGIC:  No seizures.  SKIN:  No rash.



PAST MEDICAL HISTORY:  The patient has past medical history of diabetes

type 2, DKA, weakness, nausea, vomiting, and ground-level fall.  She has

history of right-sided weakness and also subdural hematoma.  She has

hypernatremia, hyperglycemia.



ALLERGIES:  No known drug allergies.  No antibiotic allergies.



SOCIAL HISTORY:  Negative for smoking, alcohol, or drug abuse.



FAMILY HISTORY:  Noncontributory.  Negative for exposure to tuberculosis or

cancer.



MEDICATIONS:  Upon reviewing the MAR, she is on the following medications.

She is on _____ atorvastatin, insulin, metoprolol, pantoprazole,

meropenem.  I stopped Rocephin.  She is on sodium phosphate, insulin,

acetaminophen, and tramadol.  Outside medications noted and

reconciliated.



PHYSICAL EXAMINATION:

VITAL SIGNS:  Temperature is 98.0, pulse rate 103, respiratory rate 20,

blood pressure 118/59, saturation 97% on room air.

GENERAL:  Alert, responsive, in no acute distress.

HEAD AND NECK:  Oral exam, no thrush.  Eye exam, no icterus.

Normocephalic.  Neck is supple.

HEART:  Regular.  No gallop or murmur.

ABDOMEN:  Soft.  Positive bowel sounds.  Nontender.

LUNGS:  Clear bilaterally.  No rhonchi or rales.

SKIN:  No rash.

MUSCULOSKELETAL:  No effusion.  Legs are without cellulitis.

PERIPHERAL VASCULAR:  No cyanosis or gangrene.

GENITOURINARY:  No Vincent.

LINE SITES:  Without phlebitis.

NEUROLOGIC:  Generalized weakness.  Alert and responsive.



LABORATORY DATA:  Sodium 168 and creatinine 0.5.  White count 10.5 and

hemoglobin 13.4.  Urinalysis had 2+ leukocyte esterase, too many to count

white blood cells.  CT scan of the chest had no pulmonary embolism; it

showed atelectasis.  CT scan of the abdomen and pelvis showed bilateral

ureters, distention of urinary bladder, perinephric stranding was seen

also on CT scan.  Cultures, blood cultures, gram-negative rods, 4 out of 4

bottles, and urine culture with gram-negative rods.



ASSESSMENT AND PLAN:

1. The patient has gram-negative UTI and pyelonephritis with perinephric

stranding, sepsis, and gram-negative bacteremia.  The patient will be

continued on meropenem 1 g IV q.8h., because of the prevalence of ESBL

organisms in the community.  Continue meropenem for gram-negative UTI,

pyelonephritis, gram-negative bacteremia, and sepsis.  Check urine culture

and blood cultures.

2. DKA.

3. Hypernatremia.

4. Diabetes type 2.

5. Ground-level fall.

6. Nausea and vomiting.

7. Diarrhea.

8. We will check stool studies.

9. Noncompliance.

10. Right-sided weakness.

11. Subdural hematoma with midline shift.

12. No known drug allergies.

13. Social history negative.

14. Family history noncontributory.

15. MAR was noted.

16. Case discussed with RN.

17. Continue treatment per primary consultants.

18. Orders were noted and entered.









  ______________________________________________

  Brijesh Looney M.D.





DR:  Malcom

D:  04/12/2020 20:27

T:  04/12/2020 23:04

JOB#:  [id number]

CC:

## 2020-04-13 NOTE — NUR
CASE MANAGEMENT:REVIEW





65 YR OLD FEMALE BIBA FROM HOME



CC: HYPERGLYCEMIA. RLQ PAIN. BILATERAL WEAKNESS



SI: DKA. HYPERNATREMIA. UTI

99.1   104  20   136/78  98% ON RA

PLT-85  NA+154  K-3.2  GLUCOSE+500  A1C+10.4



IS: 1L NS BOLUS

IV INSULIN

INSULIN GTT

IV KCL

IV KPHOS

IV MORPHINE

CT ABD/PELVIS

**: ADMITTED TO SDU







4/11/20

SI: 98.6   102  26  134/65  97% ON RA

NA+161  K-2.1  GLUCOSE+345  PLT-82



IS: IV KEPPRA Q12

IVF+KCL@150/HR

PHOSPHORUS PO TID

IV PROTONIX Q12

IV ROCEPHIN Q24

LOPRESSOR PO Q12

SSI AC+HS

IV KCL X3

PO K DUR X2

**:: SDU STATUS

DCP: FROM HOME



PLAN:

CT HEAD





4/12/20

SI: DKA. GN BACTEREMIA. HYPERNATREMIA

97.7   110  20  120/65  96% ON RA

PLT-60   NA+168   GLUCOSE+241   PHOS-1.5   MAG+2.5   AMMONIA+33





IS: IVF D5W@100/HR

IV KEPPRA Q12

LIPITOR PO QHS

LOPRESSOR PO Q12

IV PROTONIX Q12

IV MEROPENEM Q12

PHOSPHORUS PO TID

BICITRA PO Q6HRS

IV ROCEPHIN Q24

INSULIN SQ TID AC

SSI SQ Q6HRS

**: SDU STATUS



PLAN:

DOWNGRADE TO TELEMETRY 





4/13/20

SI: DKA. GN BACTEREMIA. HYPERNATREMIA

96.8   101  19  124/56  98% ON RA

PLT-  NA+161   K+2.4  CO2+34   GLUCOSE+275  PHOS-1.6  ALB-1.6





IS: IVF D5W@100/HR

IV KEPPRA Q12

LIPITOR PO QHS

LOPRESSOR PO Q12

IV PROTONIX Q12

IV MEROPENEM Q12

PHOSPHORUS PO TID

BICITRA PO Q6HRS

IV ROCEPHIN Q24

INSULIN SQ TID AC

SSI SQ Q6HRS

**: NOW ON TELEMETRY 

DCP: FROM HOME


-------------------------------------------------------------------------------

Addendum: 04/13/20 at 1557 by LELO OWENS LVN LVN

-------------------------------------------------------------------------------

**INTERQUAL CRITERIA MET

## 2020-04-13 NOTE — NUR
*-*DISCHARGE PLANNING*-*



PATIENT HAS BEEN REFERRED TO:



[facility]

 P: [phone]



[facility] [place]

 P: [phone]

  

[facility]

 P: [phone]

-------------------------------------------------------------------------------

Addendum: 04/14/20 at 1231 by ELKE DANIELS CM

-------------------------------------------------------------------------------

*-*DISCHARGE PLANNING*-*



PATIENT HAS BEEN REFERRED TO:



[facility]

 P: [phone]

S/[facility], NOT ACCEPTING ANY PATIENT UNTIL THE END OF THE WEEK



[facility] [place]

 P: [phone]

S/[facility] TONIE, STATED UNABLE TO ACCEPT PATIENT, NOT CONTACTED W/ [facility] [employer] [facility] 

  

[facility]

 P: [phone]

S/W MONE, NO BEDS AVAILABLE

## 2020-04-13 NOTE — NUR
NURSE NOTES:



Received patient from DEREK Solis. Pt is Lao and Hungarian speaking. Pt is lying supine in 
bed, no signs of cardiac or respiratory distress at this time. Alert/oriented x2. Call light 
within reach, bed in lowest position and locked, side rails up x2, bed alarm on.  pt has 
bruner catheter for urinary for retention.  Will continue to monitor pt.

## 2020-04-13 NOTE — NUR
NURSE NOTES:

Pt noted to have potentially unstageable sore on the sacrum, one area has partial thickness 
loss, other area dark and nonblanchable all posterior medial sacral. Optifoam applied. Also 
noted right dorsum of foot has nonblanchable darkness

## 2020-04-13 NOTE — NUR
BEDSIDE SWALLOW EVALUATION 

REFERRED FOR SWALLOW EVALUATION BY DR MARQUEZ (SEE FULL REPORT IN ST [facility] 

ACTIVITY SECTION)



DYSPHAGIA RISK FACTORS FOR THIS 65 Y.O.FEMALE:

ACUTE: SUBDURAL HEMATOMA W/ RIGHT SIDED WEAKNESS, AFIB, UTI, STEMI

POLST: PT IS FULL CODE 

RESPIRATORY: PT IS ON ROOM AIR. 



INITIAL IMPRESSIONS:

MILD TO MODERATE OROPHARYNGEAL DYSPHAGIA WITH INCREASED ORAL PREP AND 

OROPHARYNGEAL TRANSIT TIMES. PT OBSERVED W/ ADEQUATE LIP CLOSURE, SLIGHTLY 

SLOW TONGUE ROM. HAS INTACT DENTITION. REFLEXIVE AND VOLUNTARY COUGH IS 

WEAK.



-GIVEN THIN LIQUIDS VIA STRAW SEQUENTIAL SIPS, (WEAK) COUGHED ON THE LAST 

SWALLOW AFTER FINISHING 3 0Z OF WATER ([place] SWALLOW PROTOCOL). 

-GIVEN NECTAR THICK LIQUIDS VIA STRAW SEQUENTIAL SIP, NO OVERT ASPIRATION.

-GIVEN PUREED, NO ORAL RESIDUE NOR OVERT ASPIRATION.

-GIVEN CHEWABLE SOLIDS, PROLONGED MASTICATION TIMING, MILD ORAL RESIDUE 

REMAINING IN ORAL CAVITY S/P SWALLOW, PT W/ WEAK COUGH AND OVERT S/S OF 

ASPIRATION



PT PRESENTING AS A HIGH ASPIRATION RISK DUE TO ACUTE SUBDURAL HEMATOMA W/ 

RIGHT SIDED WEAKNESS



RECOMMENDATIONS:

-CONTINUE PUREE SOLIDS WITH NECTAR THICK LIQUIDS DIET.

-CONSIDER MOD BARIUM SWALLOW STUDY AS IP (OR OP IF D/C) TO FURTHER ASSESS 

SWALLOW, DETERMINE SILENT ASPIRATION RISK/ETIOLOGY, AND ATTEMPT TRIAL TX 

TECHNIQUES.

-SKILLED DYSPHAGIA MANAGEMENT AND TX AND COG-COM EVAL/TX FOR COMMUNICATION 

TIPS.

-EDUCATED/TRAINED STAFF IN POSTED PRECAUTIONS

## 2020-04-13 NOTE — NEUROLOGY PROGRESS NOTE
Interim History


Interim History


ROS Limited/Unobtainable:  No


Interim History


less somnolent, more interactive today





Review of Systems


All Systems:  reviewed and negative except above





Objective


Physical Exam





Last Vital Signs








  Date Time  Temp Pulse Resp B/P (MAP) Pulse Ox O2 Delivery O2 Flow Rate FiO2


 


4/13/20 21:00  90  108/62    


 


4/13/20 20:00 101.1  28  93   


 


4/13/20 09:00      Room Air  











Laboratory Tests








Test


  4/13/20


07:05


 


White Blood Count


  8.0 K/UL


(4.8-10.8)


 


Red Blood Count


  3.95 M/UL


(4.20-5.40)  L


 


Hemoglobin


  12.5 G/DL


(12.0-16.0)


 


Hematocrit


  34.7 %


(37.0-47.0)  L


 


Mean Corpuscular Volume 88 FL (80-99)  


 


Mean Corpuscular Hemoglobin


  31.7 PG


(27.0-31.0)  H


 


Mean Corpuscular Hemoglobin


Concent 36.1 G/DL


(32.0-36.0)  H


 


Red Cell Distribution Width


  12.1 %


(11.6-14.8)


 


Platelet Count


  56 K/UL


(150-450)  L


 


Mean Platelet Volume


  8.1 FL


(6.5-10.1)


 


Neutrophils (%) (Auto)


  % (45.0-75.0)


 


 


Lymphocytes (%) (Auto)


  % (20.0-45.0)


 


 


Monocytes (%) (Auto)  % (1.0-10.0)  


 


Eosinophils (%) (Auto)  % (0.0-3.0)  


 


Basophils (%) (Auto)  % (0.0-2.0)  


 


Differential Total Cells


Counted 100  


 


 


Neutrophils % (Manual) 82 % (45-75)  H


 


Lymphocytes % (Manual) 15 % (20-45)  L


 


Monocytes % (Manual) 2 % (1-10)  


 


Eosinophils % (Manual) 1 % (0-3)  


 


Basophils % (Manual) 0 % (0-2)  


 


Band Neutrophils 0 % (0-8)  


 


Platelet Estimate Decreased  L


 


Platelet Morphology Normal  


 


Red Blood Cell Morphology Normal  


 


D-Dimer


  2.86 mg/L FEU


(0.00-0.49)  H


 


Sodium Level


  161 MMOL/L


(136-145)  *H


 


Potassium Level


  2.4 MMOL/L


(3.5-5.1)  *L


 


Chloride Level


  120 MMOL/L


()  H


 


Carbon Dioxide Level


  34 MMOL/L


(21-32)  H


 


Anion Gap


  8 mmol/L


(5-15)


 


Blood Urea Nitrogen


  15 mg/dL


(7-18)


 


Creatinine


  0.6 MG/DL


(0.55-1.30)


 


Estimat Glomerular Filtration


Rate > 60 mL/min


(>60)


 


Glucose Level


  275 MG/DL


()  H


 


Uric Acid


  1.9 MG/DL


(2.6-7.2)  L


 


Calcium Level


  7.9 MG/DL


(8.5-10.1)  L


 


Phosphorus Level


  1.6 MG/DL


(2.5-4.9)  L


 


Magnesium Level


  2.1 MG/DL


(1.8-2.4)


 


Total Bilirubin


  0.3 MG/DL


(0.2-1.0)


 


Aspartate Amino Transf


(AST/SGOT) 33 U/L (15-37)


 


 


Alanine Aminotransferase


(ALT/SGPT) 26 U/L (12-78)


 


 


Alkaline Phosphatase


  109 U/L


()


 


Lactate Dehydrogenase


  356 U/L


()  H


 


C-Reactive Protein,


Quantitative 24.1 mg/dL


(0.00-0.90)  H


 


Total Protein


  5.9 G/DL


(6.4-8.2)  L


 


Albumin


  1.6 G/DL


(3.4-5.0)  L


 


Globulin 4.3 g/dL  


 


Albumin/Globulin Ratio


  0.4 (1.0-2.7)


L








Head:  normocophalic


Neck:  no rigidity


EENT:  benign





Neurologic Exam


Mental Status:  awake


Speech:  normal speech


Language:  normal language


Cranial Nerve VII:  no facial asymmetry


Objective


somnolent, wakes up.


Oriented  x 2


Non focal


cc 35 min





Impression/Recommendations


Problems:  


(1) STEMI (ST elevation myocardial infarction)


(2) Atrial fibrillation with rapid ventricular response


(3) Prolonged Q-T interval on ECG


(4) DKA (diabetic ketoacidoses)


(5) Hypernatremia


(6) UTI (urinary tract infection)


(7) Subdural hematoma


Diagnostic Impression


Repeat ct brain this am stable from prior.


cont keppra ppx 500 mg bid - if too sedating will lower to 250


SBP < 150


Speech eval pending


PT Osmin Stephens MD Apr 13, 2020 22:26

## 2020-04-13 NOTE — NUR
****DISCHARGE PLANNING

DISCHARGE PLANNING ORDER NOTED

WILL REFER TO JAQUELINE LAO,[facility]

PATIENT WILL NEED PHYSICAL THERAPY EVAL AND AUTHORIZATION/APPROVAL FROM [facility] 
PLACEMENT

## 2020-04-13 NOTE — GENERAL PROGRESS NOTE
Assessment/Plan


Problem List:  


(1) Abnormal thyroid blood test


ICD Codes:  R79.89 - Other specified abnormal findings of blood chemistry


SNOMED:  942402811, 810444839916202


(2) Diabetes mellitus out of control


ICD Codes:  E11.65 - Type 2 diabetes mellitus with hyperglycemia


SNOMED:  64017065, 321621477


(3) DKA (diabetic ketoacidoses)


ICD Codes:  E11.10 - Type 2 diabetes mellitus with ketoacidosis without coma


SNOMED:  428255317, 23081548


(4) STEMI (ST elevation myocardial infarction)


ICD Codes:  I21.3 - ST elevation (STEMI) myocardial infarction of unspecified 

site


SNOMED:  669467155


(5) Prolonged Q-T interval on ECG


ICD Codes:  R94.31 - Abnormal electrocardiogram [ECG] [EKG]


SNOMED:  137850037


(6) Atrial fibrillation with rapid ventricular response


ICD Codes:  I48.91 - Unspecified atrial fibrillation


SNOMED:  299954452041646


(7) Hypernatremia


ICD Codes:  E87.0 - Hyperosmolality and hypernatremia


SNOMED:  304728003, 24763266


Assessment/Plan:


Levemir 18 units x one now 


increase Levemir to 16 units qhs 


increase Novolog to 8 units ac tid 


continue NISS ac / hs 





continue to hold Levothyroxine





Subjective


Allergies:  


Coded Allergies:  


     No Known Allergies (Unverified , 4/15/15)


All Systems:  reviewed and negative except above


Subjective


events noted 


interval notes reviewed 


DKA resolved 


Glucose elevated while on dextrose infusion 











Item Value  Date Time


 


Bedside Blood Glucose 257 mg/dl H 4/13/20 0640


 


Bedside Blood Glucose 295 mg/dl H 4/12/20 2219


 


Bedside Blood Glucose 390 mg/dl H 4/12/20 1722


 


Bedside Blood Glucose 287 mg/dl H 4/12/20 1249











Objective





Last 24 Hour Vital Signs








  Date Time  Temp Pulse Resp B/P (MAP) Pulse Ox O2 Delivery O2 Flow Rate FiO2


 


4/13/20 10:03  107  131/63    


 


4/13/20 08:00 99.7 107 20 131/63 (85) 97   


 


4/13/20 04:00 96.8 101 19 125/56 (79) 98   


 


4/13/20 04:00  101      


 


4/13/20 00:00 97.7 101 19 112/58 (76) 98   


 


4/13/20 00:00  101      


 


4/12/20 20:57  107  116/57    


 


4/12/20 20:00  106      


 


4/12/20 20:00 97.0 107 20 116/57 (76) 98   


 


4/12/20 16:00  105      


 


4/12/20 16:00      Room Air  


 


4/12/20 16:00 98.0 103 20 118/59 (78) 97   


 


4/12/20 12:24  98      


 


4/12/20 12:00      Room Air  


 


4/12/20 12:00 98.1 97 19 117/76 (90) 97   

















Intake and Output  


 


 4/12/20 4/13/20





 19:00 07:00


 


Intake Total 2277.888 ml 


 


Output Total  1400 ml


 


Balance 2277.888 ml -1400 ml


 


  


 


Intake Oral 234 ml 


 


IV Total 2043.888 ml 


 


Output Urine Total  1400 ml


 


# Voids 1 


 


# Bowel Movements 2 








Laboratory Tests


4/12/20 14:00: Acetone, Qualitative Positive


4/13/20 07:05: 


White Blood Count 8.0, Red Blood Count 3.95L, Hemoglobin 12.5, Hematocrit 34.7L

, Mean Corpuscular Volume 88, Mean Corpuscular Hemoglobin 31.7H, Mean 

Corpuscular Hemoglobin Concent 36.1H, Red Cell Distribution Width 12.1, 

Platelet Count 56L, Mean Platelet Volume 8.1, Neutrophils (%) (Auto) , 

Lymphocytes (%) (Auto) , Monocytes (%) (Auto) , Eosinophils (%) (Auto) , 

Basophils (%) (Auto) , Differential Total Cells Counted 100, Neutrophils % (

Manual) 82H, Lymphocytes % (Manual) 15L, Monocytes % (Manual) 2, Eosinophils % (

Manual) 1, Basophils % (Manual) 0, Band Neutrophils 0, Platelet Estimate 

DecreasedL, Platelet Morphology Normal, Red Blood Cell Morphology Normal, D-

Dimer 2.86H, Sodium Level 161*H, Potassium Level 2.4*L, Chloride Level 120H, 

Carbon Dioxide Level 34H, Anion Gap 8, Blood Urea Nitrogen 15, Creatinine 0.6, 

Estimat Glomerular Filtration Rate > 60, Glucose Level 275H, Uric Acid 1.9L, 

Calcium Level 7.9L, Phosphorus Level 1.6L, Magnesium Level 2.1, Total Bilirubin 

0.3, Aspartate Amino Transf (AST/SGOT) 33, Alanine Aminotransferase (ALT/SGPT) 

26, Alkaline Phosphatase 109, Lactate Dehydrogenase 356H, C-Reactive Protein, 

Quantitative 24.1H, Total Protein 5.9L, Albumin 1.6L, Globulin 4.3, Albumin/

Globulin Ratio 0.4L


Height (Feet):  5


Height (Inches):  4.00


Weight (Pounds):  110


General Appearance:  no apparent distress


Neck:  normal alignment


Cardiovascular:  normal rate


Respiratory/Chest:  lungs clear


Abdomen:  normal bowel sounds


Pelvis:  normal external exam


Objective





Current Medications








 Medications


  (Trade)  Dose


 Ordered  Sig/Meghan


 Route


 PRN Reason  Start Time


 Stop Time Status Last Admin


Dose Admin


 


 Acetaminophen


  (Tylenol)  650 mg  Q4H  PRN


 ORAL


 Temp >100.5  4/12/20 13:15


 5/11/20 13:14   


 


 


 Acetaminophen


  (Tylenol)  650 mg  Q4H  PRN


 ORAL


 For Pain  4/12/20 14:00


 5/11/20 13:59   


 


 


 Atorvastatin


 Calcium


  (Lipitor)  40 mg  BEDTIME


 ORAL


   4/12/20 21:00


 7/10/20 20:59  4/12/20 20:56


 


 


 Dextrose  1,000 ml @ 


 100 mls/hr  Q10H


 IV


   4/12/20 13:15


 5/12/20 09:59  4/13/20 09:15


 


 


 Dextrose


  (Dextrose 50%)  25 ml  Q30M  PRN


 IV


 Hypoglycemia  4/12/20 13:15


 7/9/20 21:44   


 


 


 Dextrose


  (Dextrose 50%)  50 ml  Q30M  PRN


 IV


 Hypoglycemia  4/12/20 13:15


 7/9/20 21:44   


 


 


 Insulin Aspart


  (NovoLOG)    AC+HS


 SUBQ


   4/12/20 16:30


 7/10/20 09:29  4/13/20 06:40


 


 


 Insulin Aspart


  (NovoLOG)  4 units  NOVOTIAC


 SUBQ


   4/12/20 16:50


 7/11/20 11:49  4/13/20 06:39


 


 


 Insulin Detemir


  (Levemir)  12 units  QHS


 SUBQ


   4/12/20 21:00


 7/11/20 20:59  4/12/20 22:19


 


 


 Levetiracetam  100 ml @ 


 400 mls/hr  Q12HR


 IVPB


   4/12/20 21:00


 7/10/20 10:59  4/13/20 09:59


 


 


 Meropenem 1 gm/


 Sodium Chloride  100 ml @ 


 200 mls/hr  Q12HR


 IVPB


   4/12/20 21:00


 4/17/20 20:59  4/13/20 10:02


 


 


 Metoprolol


 Tartrate


  (Lopressor)  25 mg  Q12HR


 ORAL


   4/12/20 21:00


 7/10/20 20:59  4/13/20 10:03


 


 


 Pantoprazole


  (Protonix)  40 mg  EVERY 12  HOURS


 IVP


   4/12/20 21:00


 5/11/20 12:59  4/13/20 10:03


 


 


 Phosphorus


  (Phospha 250


 Neutral)  250 mg  THREE TIMES A  DAY


 ORAL


   4/12/20 18:00


 5/11/20 12:59  4/13/20 10:03


 


 


 Potassium


 Phosphate  250 ml @ 


 62.5 mls/hr  Q4H


 IVPB


   4/13/20 11:00


 4/13/20 18:59  4/13/20 11:34


 


 


 Potassium Chloride


  (K-Dur)  40 meq  TID


 ORAL


   4/13/20 09:15


 7/12/20 09:14  4/13/20 10:03


 


 


 Sodium Citrate


  (Bicitra)  30 ml  EVERY 6  HOURS


 ORAL


   4/12/20 18:00


 5/11/20 12:29  4/13/20 06:37


 


 


 Tramadol HCl


  (Ultram)  25 mg  Q6H  PRN


 ORAL


 Severe Breakthru Pain (>7)  4/12/20 14:00


 4/18/20 13:59   


 

















Kevin Mohr MD Apr 13, 2020 11:55

## 2020-04-13 NOTE — NEPHROLOGY PROGRESS NOTE
Assessment/Plan


Problem List:  


(1) Hypernatremia


Assessment:  Improving





(2) UTI (urinary tract infection)


(3) Subdural hematoma


(4) DKA (diabetic ketoacidoses)


(5) Electrolyte imbalance


Assessment





Hypernatremia most likely due to free water deficit


Hypokalemia, hypophosphatemia


Hyperglycemia and DKA


Evidence of UTI


Right-sided weakness with 3 mm subdural hematoma found in CT scan


Plan


Change Protonix to p.o.


Hypotonic IV solution, IV was changed to D5W


Potassium and phosphorus and magnesium supplement as needed


Diet started as the patient is more alert


Low dose of Lopressor


Monitor renal parameters and electrolytes


Neuro eval noted


Keep the blood pressure and blood sugar in check


Urine studies


Per orders





Subjective


ROS Limited/Unobtainable:  No


Constitutional:  Reports: malaise, weakness





Objective


Objective





Last 24 Hour Vital Signs








  Date Time  Temp Pulse Resp B/P (MAP) Pulse Ox O2 Delivery O2 Flow Rate FiO2


 


4/13/20 10:03  107  131/63    


 


4/13/20 08:00 99.7 107 20 131/63 (85) 97   


 


4/13/20 08:00  106      


 


4/13/20 04:00 96.8 101 19 125/56 (79) 98   


 


4/13/20 04:00  101      


 


4/13/20 00:00 97.7 101 19 112/58 (76) 98   


 


4/13/20 00:00  101      


 


4/12/20 20:57  107  116/57    


 


4/12/20 20:00  106      


 


4/12/20 20:00 97.0 107 20 116/57 (76) 98   


 


4/12/20 16:00  105      


 


4/12/20 16:00      Room Air  


 


4/12/20 16:00 98.0 103 20 118/59 (78) 97   

















Intake and Output  


 


 4/12/20 4/13/20





 19:00 07:00


 


Intake Total 2277.888 ml 


 


Output Total  1400 ml


 


Balance 2277.888 ml -1400 ml


 


  


 


Intake Oral 234 ml 


 


IV Total 2043.888 ml 


 


Output Urine Total  1400 ml


 


# Voids 1 


 


# Bowel Movements 2 








Laboratory Tests


4/12/20 14:00: Acetone, Qualitative Positive


4/13/20 07:05: 


White Blood Count 8.0, Red Blood Count 3.95L, Hemoglobin 12.5, Hematocrit 34.7L

, Mean Corpuscular Volume 88, Mean Corpuscular Hemoglobin 31.7H, Mean 

Corpuscular Hemoglobin Concent 36.1H, Red Cell Distribution Width 12.1, 

Platelet Count 56L, Mean Platelet Volume 8.1, Neutrophils (%) (Auto) , 

Lymphocytes (%) (Auto) , Monocytes (%) (Auto) , Eosinophils (%) (Auto) , 

Basophils (%) (Auto) , Differential Total Cells Counted 100, Neutrophils % (

Manual) 82H, Lymphocytes % (Manual) 15L, Monocytes % (Manual) 2, Eosinophils % (

Manual) 1, Basophils % (Manual) 0, Band Neutrophils 0, Platelet Estimate 

DecreasedL, Platelet Morphology Normal, Red Blood Cell Morphology Normal, D-

Dimer 2.86H, Sodium Level 161*H, Potassium Level 2.4*L, Chloride Level 120H, 

Carbon Dioxide Level 34H, Anion Gap 8, Blood Urea Nitrogen 15, Creatinine 0.6, 

Estimat Glomerular Filtration Rate > 60, Glucose Level 275H, Uric Acid 1.9L, 

Calcium Level 7.9L, Phosphorus Level 1.6L, Magnesium Level 2.1, Total Bilirubin 

0.3, Aspartate Amino Transf (AST/SGOT) 33, Alanine Aminotransferase (ALT/SGPT) 

26, Alkaline Phosphatase 109, Lactate Dehydrogenase 356H, C-Reactive Protein, 

Quantitative 24.1H, Total Protein 5.9L, Albumin 1.6L, Globulin 4.3, Albumin/

Globulin Ratio 0.4L


Height (Feet):  5


Height (Inches):  4.00


Weight (Pounds):  110


General Appearance:  no apparent distress, lethargic


Cardiovascular:  tachycardia


Objective


No change











Jonathan Jacobson MD Apr 13, 2020 13:31

## 2020-04-14 VITALS — DIASTOLIC BLOOD PRESSURE: 71 MMHG | SYSTOLIC BLOOD PRESSURE: 125 MMHG

## 2020-04-14 VITALS — SYSTOLIC BLOOD PRESSURE: 118 MMHG | DIASTOLIC BLOOD PRESSURE: 70 MMHG

## 2020-04-14 VITALS — DIASTOLIC BLOOD PRESSURE: 66 MMHG | SYSTOLIC BLOOD PRESSURE: 119 MMHG

## 2020-04-14 VITALS — DIASTOLIC BLOOD PRESSURE: 60 MMHG | SYSTOLIC BLOOD PRESSURE: 112 MMHG

## 2020-04-14 VITALS — DIASTOLIC BLOOD PRESSURE: 67 MMHG | SYSTOLIC BLOOD PRESSURE: 117 MMHG

## 2020-04-14 LAB
ADD MANUAL DIFF: YES
ALBUMIN SERPL-MCNC: 1.4 G/DL (ref 3.4–5)
ALBUMIN/GLOB SERPL: 0.3 {RATIO} (ref 1–2.7)
ALP SERPL-CCNC: 98 U/L (ref 46–116)
ALT SERPL-CCNC: 29 U/L (ref 12–78)
AST SERPL-CCNC: 46 U/L (ref 15–37)
BILIRUB SERPL-MCNC: 0.2 MG/DL (ref 0.2–1)
BUN SERPL-MCNC: 12 MG/DL (ref 7–18)
CALCIUM SERPL-MCNC: 7.2 MG/DL (ref 8.5–10.1)
CHLORIDE SERPL-SCNC: 122 MMOL/L (ref 98–107)
CO2 SERPL-SCNC: 27 MMOL/L (ref 21–32)
CREAT SERPL-MCNC: 0.5 MG/DL (ref 0.55–1.3)
ERYTHROCYTE [DISTWIDTH] IN BLOOD BY AUTOMATED COUNT: 12.3 % (ref 11.6–14.8)
GLOBULIN SER-MCNC: 4.2 G/DL
HCT VFR BLD CALC: 32.2 % (ref 37–47)
HGB BLD-MCNC: 11 G/DL (ref 12–16)
MCV RBC AUTO: 90 FL (ref 80–99)
PHOSPHATE SERPL-MCNC: 2.3 MG/DL (ref 2.5–4.9)
PLATELET # BLD: 52 K/UL (ref 150–450)
POTASSIUM SERPL-SCNC: 3 MMOL/L (ref 3.5–5.1)
RBC # BLD AUTO: 3.57 M/UL (ref 4.2–5.4)
SODIUM SERPL-SCNC: 159 MMOL/L (ref 136–145)
WBC # BLD AUTO: 6.3 K/UL (ref 4.8–10.8)

## 2020-04-14 RX ADMIN — INSULIN ASPART SCH UNITS: 100 INJECTION, SOLUTION INTRAVENOUS; SUBCUTANEOUS at 13:50

## 2020-04-14 RX ADMIN — INSULIN ASPART SCH UNITS: 100 INJECTION, SOLUTION INTRAVENOUS; SUBCUTANEOUS at 06:28

## 2020-04-14 RX ADMIN — INSULIN ASPART SCH UNITS: 100 INJECTION, SOLUTION INTRAVENOUS; SUBCUTANEOUS at 13:45

## 2020-04-14 RX ADMIN — INSULIN ASPART SCH UNITS: 100 INJECTION, SOLUTION INTRAVENOUS; SUBCUTANEOUS at 18:10

## 2020-04-14 RX ADMIN — VANCOMYCIN HYDROCHLORIDE SCH MG: 500 INJECTION, POWDER, LYOPHILIZED, FOR SOLUTION INTRAVENOUS at 23:00

## 2020-04-14 RX ADMIN — INSULIN DETEMIR SCH UNITS: 100 INJECTION, SOLUTION SUBCUTANEOUS at 23:45

## 2020-04-14 RX ADMIN — INSULIN ASPART SCH UNITS: 100 INJECTION, SOLUTION INTRAVENOUS; SUBCUTANEOUS at 06:27

## 2020-04-14 RX ADMIN — SODIUM CHLORIDE SCH MLS/HR: 0.9 INJECTION INTRAVENOUS at 21:00

## 2020-04-14 RX ADMIN — LEVETIRACETAM SCH MLS/HR: 5 INJECTION INTRAVENOUS at 09:56

## 2020-04-14 RX ADMIN — DIBASIC SODIUM PHOSPHATE, MONOBASIC POTASSIUM PHOSPHATE AND MONOBASIC SODIUM PHOSPHATE SCH MG: 852; 155; 130 TABLET ORAL at 09:57

## 2020-04-14 RX ADMIN — DIBASIC SODIUM PHOSPHATE, MONOBASIC POTASSIUM PHOSPHATE AND MONOBASIC SODIUM PHOSPHATE SCH MG: 852; 155; 130 TABLET ORAL at 18:06

## 2020-04-14 RX ADMIN — INSULIN ASPART SCH UNITS: 100 INJECTION, SOLUTION INTRAVENOUS; SUBCUTANEOUS at 13:53

## 2020-04-14 RX ADMIN — DIBASIC SODIUM PHOSPHATE, MONOBASIC POTASSIUM PHOSPHATE AND MONOBASIC SODIUM PHOSPHATE SCH MG: 852; 155; 130 TABLET ORAL at 13:23

## 2020-04-14 NOTE — NUR
CASE MANAGEMENT:REVIEW



4/14/20

SI: SEPSIS D/T E COLI BACTEREMIA AND UTI. DKA

99.5   97  26  125/71   98% ON RA

H/H-11.0/32.2    PLT-52



IS: IV MEROPENEM Q12

IVF@100/HR

LOPRESSOR PO Q12

PROTONIX PO QD

LEVEMIR SQ QHS

INSULIN SQ TID AC

K-DUR PO TID

IV KEPPRA Q12

LIPITOR PO QHS

**: TELEMETRY STATUS

DCP: FROM HOME



PLAN:

REFERRED TO

[facility]

[facility]

## 2020-04-14 NOTE — NUR
RD ASSESSMENT & RECOMMENDATIONS

SEE CARE ACTIVITY FOR COMPLETE ASSESSMENT



DAILY ESTIMATED NEEDS:

Needs based on DM 61.5kg  

25-30  kcals/kg 

3414-0785  total kcals

1.25-1.5  g protein/kg

77-92  g total protein 

25-30  mL/kg

4085-6038  total fluid mLs



NUTRITION DIAGNOSIS:

Altered nutrition related lab values r/t DKA, clinical status as evidenced

by A1C 10.4,  on adm, (+) acetone, now w/ critically elev Na(161*)

and low K(2.4*).



CURRENT DIET: CCHO LOW puree + NTL     



PO DIET RECOMMENDATIONS:

CCHO LOW diet/ texture per SLP  



ADDITIONAL RECOMMENDATIONS:

1) W/ variable po intake add Glucerna 1 tetra per day

    Add high pro/1 carb snacks in b/w meals   

2) On D5 for hydration, monitor BG  

3) Wound care: Add JONATHAN BID + Vit C 250mg daily (f/up w/ WC eval) 

4) Obtain a calibrated bed scale wts 

    EMR wt: 110#      vs        Bed scale wt: 135#

## 2020-04-14 NOTE — NEUROLOGY PROGRESS NOTE
Interim History


Interim History


ROS Limited/Unobtainable:  No





Review of Systems


Neuro Review of Systems


weaker in AM, got head ct brain stat, no changes


reduced keppra due to somnolence





Objective


Physical Exam





Last Vital Signs








  Date Time  Temp Pulse Resp B/P (MAP) Pulse Ox O2 Delivery O2 Flow Rate FiO2


 


4/14/20 16:00  87      


 


4/14/20 15:00 98.1  18 119/66 (83) 95   


 


4/14/20 09:00      Room Air  











Laboratory Tests








Test


  4/14/20


07:40


 


White Blood Count


  6.3 K/UL


(4.8-10.8)


 


Red Blood Count


  3.57 M/UL


(4.20-5.40)  L


 


Hemoglobin


  11.0 G/DL


(12.0-16.0)  L


 


Hematocrit


  32.2 %


(37.0-47.0)  L


 


Mean Corpuscular Volume 90 FL (80-99)  


 


Mean Corpuscular Hemoglobin


  30.9 PG


(27.0-31.0)


 


Mean Corpuscular Hemoglobin


Concent 34.2 G/DL


(32.0-36.0)


 


Red Cell Distribution Width


  12.3 %


(11.6-14.8)


 


Platelet Count


  52 K/UL


(150-450)  L


 


Mean Platelet Volume


  8.1 FL


(6.5-10.1)


 


Neutrophils (%) (Auto)


  % (45.0-75.0)


 


 


Lymphocytes (%) (Auto)


  % (20.0-45.0)


 


 


Monocytes (%) (Auto)  % (1.0-10.0)  


 


Eosinophils (%) (Auto)  % (0.0-3.0)  


 


Basophils (%) (Auto)  % (0.0-2.0)  


 


Differential Total Cells


Counted 100  


 


 


Neutrophils % (Manual) 80 % (45-75)  H


 


Lymphocytes % (Manual) 16 % (20-45)  L


 


Monocytes % (Manual) 1 % (1-10)  


 


Eosinophils % (Manual) 1 % (0-3)  


 


Basophils % (Manual) 0 % (0-2)  


 


Band Neutrophils 2 % (0-8)  


 


Platelet Estimate Decreased  L


 


Platelet Morphology Normal  


 


Red Blood Cell Morphology Normal  


 


Sodium Level


  159 MMOL/L


(136-145)  H


 


Potassium Level


  3.0 MMOL/L


(3.5-5.1)  L


 


Chloride Level


  122 MMOL/L


()  H


 


Carbon Dioxide Level


  27 MMOL/L


(21-32)


 


Blood Urea Nitrogen


  12 mg/dL


(7-18)


 


Creatinine


  0.5 MG/DL


(0.55-1.30)  L


 


Estimat Glomerular Filtration


Rate > 60 mL/min


(>60)


 


Glucose Level


  137 MG/DL


()  #H


 


Uric Acid


  1.6 MG/DL


(2.6-7.2)  L


 


Calcium Level


  7.2 MG/DL


(8.5-10.1)  L


 


Phosphorus Level


  2.3 MG/DL


(2.5-4.9)  L


 


Magnesium Level


  1.9 MG/DL


(1.8-2.4)


 


Total Bilirubin


  0.2 MG/DL


(0.2-1.0)


 


Aspartate Amino Transf


(AST/SGOT) 46 U/L (15-37)


H


 


Alanine Aminotransferase


(ALT/SGPT) 29 U/L (12-78)


 


 


Alkaline Phosphatase


  98 U/L


()


 


Troponin I


  0.045 ng/mL


(0.000-0.056)


 


C-Reactive Protein,


Quantitative 14.1 mg/dL


(0.00-0.90)  H


 


Pro-B-Type Natriuretic Peptide


  436 pg/mL


(0-125)  H


 


Total Protein


  5.6 G/DL


(6.4-8.2)  L


 


Albumin


  1.4 G/DL


(3.4-5.0)  L


 


Globulin 4.2 g/dL  


 


Albumin/Globulin Ratio


  0.3 (1.0-2.7)


L








Head:  normocophalic


Neck:  no rigidity


EENT:  benign





Neurologic Exam


Mental Status:  awake


Speech:  normal speech


Language:  normal language


Cranial Nerve VII:  no facial asymmetry


Objective


somnolent, wakes up.


Oriented  x 2


Non focal


cc 35 min





Impression/Recommendations


Problems:  


(1) STEMI (ST elevation myocardial infarction)


(2) Atrial fibrillation with rapid ventricular response


(3) Prolonged Q-T interval on ECG


(4) DKA (diabetic ketoacidoses)


(5) Hypernatremia


(6) UTI (urinary tract infection)


(7) Subdural hematoma


Diagnostic Impression


repeat stat ct stable


lowered keppra to 250 mg bid


SBP < 150


PT OT


consider Osmin Chao MD Apr 14, 2020 20:37

## 2020-04-14 NOTE — GENERAL PROGRESS NOTE
Assessment/Plan


Assessment/Plan:


65-year-old female with PMH of medical noncompliance, DM type II who presents 

with b/l weakness, N/V, GLF, on admission pt found to be in DKA with 's.





#DKA - improved


#E. coli UTI


#E. coli bacteremia


#Uncontrolled DM, Hgb A1C 10.4


-continue in-patient medical care


-s/p DKA protocol with improved AG, AG 21-> 14


-CT abd reviewed


-4/10: UCx e. coli


-4/10: BCx w/E. coli 2/2


-4/12: BCx NGTD


-cont. accuchecks, ISS qAC/HS


-levemir 16 U qHS, Novolog 8 U qAC


-ID, Dr. Looney: merrem


-Endo consulted, recs appreciated





#Right sided weakness


#Generalized Weakness


#SDH 3mm no midline shift


-pt AOx3, pt noted fall 3 days ago


-found on CT head, repeat CT head stable


-STAT CT head obtained given worsening weakness, stable compared to previous


-d/w Neuro, no need for transfer at this time, will decrease keppra


-cont. fall precautions


-PT treat and eval


-Neurology consulted, Dr. Cheney, recs appreciated


-CM for d/c planning





#Elevated d-dimer


#Tachycardia - improved


-can be multifactorial given infection, however concern for PE, PE ruled out


-EKG w/NSR


-CTA thorax negative for PE


-likely 2/2 dehydration, encourage PO intake





#Hypernatremia


#Hypokalemia


#Hypophosphatemia


-Hypernatremia likely 2/2 free water deficit


-replace electrolytes, cont. to monitor and replace PRN


-d/w nephro, cont. bruner for accurate I's and O's


-Nephro consulted, IVF and electrolytes per nephro


-will give K Phos today





DVT - SCDs for now given SDH





I spent 35 minutes on this patient's case, and 50% was dedicated to counseling 

and/or care coordination witch included communication with RN, consulting MDs.





Subjective


Allergies:  


Coded Allergies:  


     No Known Allergies (Unverified , 4/15/15)


Subjective


F/u DKA, UTI, hypernatremia, SDH.


Na remains elevated, downtrending.


Pt notes b/l weakness, denies SOB, CP. 


Exam done w/ as pt speaks both Wolof, Thai and 

understands some English.





Objective





Last 24 Hour Vital Signs








  Date Time  Temp Pulse Resp B/P (MAP) Pulse Ox O2 Delivery O2 Flow Rate FiO2


 


4/14/20 08:00 99.1 95 18 117/67 (84) 93   


 


4/14/20 04:34 99.5 97 26 125/71 (89) 98   


 


4/14/20 03:39  95      


 


4/14/20 00:00 98.9 90 24 112/60 (77) 96   


 


4/13/20 23:29  87      


 


4/13/20 21:00  90  108/62    


 


4/13/20 21:00      Room Air  


 


4/13/20 20:00  95      


 


4/13/20 20:00 101.1 95 28 110/62 (78) 93   


 


4/13/20 16:00 98.4 99 20 119/72 (88) 94   


 


4/13/20 16:00  93      


 


4/13/20 12:01 99.0 90 20 114/57 (76) 100   


 


4/13/20 12:00  93      


 


4/13/20 10:03  107  131/63    

















Intake and Output  


 


 4/13/20 4/14/20





 19:00 07:00


 


  


 


# Voids 3 


 


# Bowel Movements 1 2








Laboratory Tests


4/14/20 07:40: 


White Blood Count 6.3, Red Blood Count 3.57L, Hemoglobin 11.0L, Hematocrit 32.2L

, Mean Corpuscular Volume 90, Mean Corpuscular Hemoglobin 30.9, Mean 

Corpuscular Hemoglobin Concent 34.2, Red Cell Distribution Width 12.3, Platelet 

Count 52L, Mean Platelet Volume 8.1, Neutrophils (%) (Auto) , Lymphocytes (%) (

Auto) , Monocytes (%) (Auto) , Eosinophils (%) (Auto) , Basophils (%) (Auto) , 

Neutrophils % (Manual) [Pending], Lymphocytes % (Manual) [Pending], Platelet 

Estimate [Pending], Platelet Morphology [Pending], Sodium Level [Pending], 

Potassium Level [Pending], Chloride Level [Pending], Carbon Dioxide Level [

Pending], Blood Urea Nitrogen [Pending], Creatinine [Pending], Estimat 

Glomerular Filtration Rate [Pending], Glucose Level [Pending], Uric Acid [

Pending], Calcium Level [Pending], Phosphorus Level [Pending], Magnesium Level [

Pending], Total Bilirubin [Pending], Aspartate Amino Transf (AST/SGOT) [Pending]

, Alanine Aminotransferase (ALT/SGPT) [Pending], Alkaline Phosphatase [Pending]

, Troponin I [Pending], C-Reactive Protein, Quantitative [Pending], Pro-B-Type 

Natriuretic Peptide [Pending], Total Protein [Pending], Albumin [Pending], 

Globulin [Pending]


Height (Feet):  5


Height (Inches):  4.00


Weight (Pounds):  110


Objective


General: NAD, laying in bed comfortably, AAO x3


HEENT: NCAT, EOMi, mucous membranes moist


CV: RRR, no murmurs, rubs, or gallops


Pulm: CTAB, No wheezes, rhonchi, or rales, no accessory muscle usage


GI: Soft, nontender, nondistended, bowel sounds present


Ext: No lower extremity edema bilaterally


: Bruner in place, draining yellow urine


Neuro: CN II-XII grossly intact, sensation intact bilaterally, MS 3/5 in UE, 0/

5 LE B/L











Anitha Ochoa M.D. Apr 14, 2020 09:17

## 2020-04-14 NOTE — NUR
*-*DISCHARGE PLANNING*-*



PATIENT HAS BEEN REFERRED TO:



[facility]

 P: [phone]

 F: [phone]



[facility]

 P: [phone]

 F: [phone]



[facility]

 P: [phone]

 F: [phone]



[facility]

 P: [phone]

 F: [phone]

-------------------------------------------------------------------------------

Addendum: 04/14/20 at 1539 by ELKE DANIELS CM

-------------------------------------------------------------------------------

PATIENT HAS BEEN REFERRED TO



[facility]

 P: [phone]

~~~~~~~PHYSICAL THERAPY NOTES ARE PENDING~~~~~~~~~~~~~~


-------------------------------------------------------------------------------

Addendum: 04/15/20 at 1208 by ELKE DANIELS CM

-------------------------------------------------------------------------------

PATIENT HAS BEEN REFERRED TO:



[facility]

 P: [phone]

 S/W LAUREANO, UNABLE  TO ACCEPT PATIENT 



[facility]

 P: [phone]

S/W ONELIA, NO AVAILABLE BEDS AT THIS MOMENT.



[facility]

 P: [phone]

 S/W ASYA, NOT ACCEPTING PATIENTS AT THE MOMENT 



[facility]

 P: [phone]

 S/W ASHVIN,  NOT ACCEPTING ANY BEDS AT THIS TIME

## 2020-04-14 NOTE — INFECTIOUS DISEASES PROG NOTE
Assessment/Plan


Assessment/Plan





ASSESSMENT AND PLAN:





1. e.coli uti/pyelonephritis with bacteremia, sepsis, fevers, ? c.diff., 

diarrhea, gram neg sepsis 





   - change to ceftriaxone - day # 4 abx


   - po vancomycin 


   - f/u on c.diff. testing


   - monitor labs and temperatures 


   - surveillance blood cultures - negative 


   - communicated with Dr. Ochoa 





2. DKA.


3. Hypernatremia.


4. Diabetes type 2.


5. Ground-level fall.


6. Nausea and vomiting.


7. Diarrhea.


8. We will check stool studies.


9. Noncompliance.


10. Right-sided weakness.


11. Subdural hematoma with midline shift.


12. No known drug allergies.


13. Social history negative.


14. Family history noncontributory.


15. MAR was noted.


16. Case discussed with RN.


17. Continue treatment per primary consultants.


18. Orders were noted and entered.





Subjective


Constitutional:  Reports: fever


HEENT:  Denies: congestion


Respiratory:  Denies: shortness of breath


Cardiovascular:  Denies: chest pain


Gastrointestinal/Abdominal:  Denies: nausea, vomiting, diarrhea


Genitourinary:  Reports: other - + bruner


Neurologic:  Denies: headache


Psychiatric:  Reports: other - NA


Skin:  Denies: rash


Hematologic:  Denies: bleeding


Musculoskeletal:  Denies: pain


Allergies:  


Coded Allergies:  


     No Known Allergies (Unverified , 4/15/15)





Objective


Vital Signs





Last 24 Hour Vital Signs








  Date Time  Temp Pulse Resp B/P (MAP) Pulse Ox O2 Delivery O2 Flow Rate FiO2


 


4/14/20 16:00  87      


 


4/14/20 15:00 98.1 91 18 119/66 (83) 95   


 


4/14/20 12:00  84      


 


4/14/20 09:57  95  117/67    


 


4/14/20 09:00      Room Air  


 


4/14/20 08:00 99.1 95 18 117/67 (84) 93   


 


4/14/20 08:00  94      


 


4/14/20 04:34 99.5 97 26 125/71 (89) 98   


 


4/14/20 03:39  95      


 


4/14/20 00:00 98.9 90 24 112/60 (77) 96   


 


4/13/20 23:29  87      


 


4/13/20 21:00  90  108/62    


 


4/13/20 21:00      Room Air  








Height (Feet):  5


Height (Inches):  4.00


Weight (Pounds):  110


General Appearance:  no acute distress


HEENT:  normocephalic, atraumatic, anicteric, mucous membranes moist


Respiratory/Chest:  lungs clear, normal breath sounds, no respiratory distress, 

no accessory muscle use


Cardiovascular:  normal rate, regular rhythm, no gallop/murmur, no JVD


Abdomen:  normal bowel sounds, soft, non tender, no organomegaly, non distended


Genitourinary:  other


Extremities:  no cyanosis


Skin:  no rash


Neurologic/Psychiatric:  CNs II-XII grossly normal, alert, responsive


Lymphatic:  no neck adenopathy


Musculoskeletal:  no effusion


Objective





CT chest - 





IMPRESSION:     


1.  No pulmonary embolus, somewhat limited at the bases due to motion.


2.  Bibasilar lung atelectasis/airspace disease.


3.  3 mm nodule along the right middle lobe fissure. 3.6 mm nodule left 


lower lobe along the fissure.  Fleischner Society Guidelines for low-risk 


patients, no follow-up is necessary.  For high-risk patients (smoking 


history or other known risk factors) an optional chest CT at 12 months 


could be performed.


4.  Mild bilateral perinephric stranding.  May be senescent, correlate 


for infection.


5.  Distended gallbladder with hyperdensity may be sludge or stones.


6.  Prominent left lobe of the liver is slightly nodular in appearance.





 





CT abdomen and pelvis:





IMPRESSION:     


1.  Urinary bladder distention.


2.  Mild heterogeneity of the lower pole of the left kidney may be 


artifactual.  Infection is not excluded.  Consider correlation with 


urinalysis, as clinically indicated.  Mild bilateral renal pelviectasis 


and prominence of bilateral ureters is likely related to distention of 


the urinary bladder.





Microbiology








 Date/Time


Source Procedure


Growth Status


 


 


 4/12/20 15:15


Blood Blood Culture - Preliminary


NO GROWTH AFTER 24 HOURS Resulted


 


 4/10/20 20:40


Urine,Clean Catch Urine Culture - Final


Escherichia Coli Complete








Microbiology








 Date/Time


Source Procedure


Growth Status


 


 


 4/12/20 15:15


Blood Blood Culture - Preliminary


NO GROWTH AFTER 24 HOURS Resulted


 


 4/12/20 15:00


Blood Blood Culture - Preliminary


NO GROWTH AFTER 24 HOURS Resulted





blood cultures - e.coli





Laboratory Tests








Test


  4/14/20


07:40


 


White Blood Count


  6.3 K/UL


(4.8-10.8)


 


Red Blood Count


  3.57 M/UL


(4.20-5.40)  L


 


Hemoglobin


  11.0 G/DL


(12.0-16.0)  L


 


Hematocrit


  32.2 %


(37.0-47.0)  L


 


Mean Corpuscular Volume 90 FL (80-99)  


 


Mean Corpuscular Hemoglobin


  30.9 PG


(27.0-31.0)


 


Mean Corpuscular Hemoglobin


Concent 34.2 G/DL


(32.0-36.0)


 


Red Cell Distribution Width


  12.3 %


(11.6-14.8)


 


Platelet Count


  52 K/UL


(150-450)  L


 


Mean Platelet Volume


  8.1 FL


(6.5-10.1)


 


Neutrophils (%) (Auto)


  % (45.0-75.0)


 


 


Lymphocytes (%) (Auto)


  % (20.0-45.0)


 


 


Monocytes (%) (Auto)  % (1.0-10.0)  


 


Eosinophils (%) (Auto)  % (0.0-3.0)  


 


Basophils (%) (Auto)  % (0.0-2.0)  


 


Differential Total Cells


Counted 100  


 


 


Neutrophils % (Manual) 80 % (45-75)  H


 


Lymphocytes % (Manual) 16 % (20-45)  L


 


Monocytes % (Manual) 1 % (1-10)  


 


Eosinophils % (Manual) 1 % (0-3)  


 


Basophils % (Manual) 0 % (0-2)  


 


Band Neutrophils 2 % (0-8)  


 


Platelet Estimate Decreased  L


 


Platelet Morphology Normal  


 


Red Blood Cell Morphology Normal  


 


Sodium Level


  159 MMOL/L


(136-145)  H


 


Potassium Level


  3.0 MMOL/L


(3.5-5.1)  L


 


Chloride Level


  122 MMOL/L


()  H


 


Carbon Dioxide Level


  27 MMOL/L


(21-32)


 


Blood Urea Nitrogen


  12 mg/dL


(7-18)


 


Creatinine


  0.5 MG/DL


(0.55-1.30)  L


 


Estimat Glomerular Filtration


Rate > 60 mL/min


(>60)


 


Glucose Level


  137 MG/DL


()  #H


 


Uric Acid


  1.6 MG/DL


(2.6-7.2)  L


 


Calcium Level


  7.2 MG/DL


(8.5-10.1)  L


 


Phosphorus Level


  2.3 MG/DL


(2.5-4.9)  L


 


Magnesium Level


  1.9 MG/DL


(1.8-2.4)


 


Total Bilirubin


  0.2 MG/DL


(0.2-1.0)


 


Aspartate Amino Transf


(AST/SGOT) 46 U/L (15-37)


H


 


Alanine Aminotransferase


(ALT/SGPT) 29 U/L (12-78)


 


 


Alkaline Phosphatase


  98 U/L


()


 


Troponin I


  0.045 ng/mL


(0.000-0.056)


 


C-Reactive Protein,


Quantitative 14.1 mg/dL


(0.00-0.90)  H


 


Pro-B-Type Natriuretic Peptide


  436 pg/mL


(0-125)  H


 


Total Protein


  5.6 G/DL


(6.4-8.2)  L


 


Albumin


  1.4 G/DL


(3.4-5.0)  L


 


Globulin 4.2 g/dL  


 


Albumin/Globulin Ratio


  0.3 (1.0-2.7)


L











Current Medications








 Medications


  (Trade)  Dose


 Ordered  Sig/Meghan


 Route


 PRN Reason  Start Time


 Stop Time Status Last Admin


Dose Admin


 


 Acetaminophen


  (Tylenol)  650 mg  Q4H  PRN


 ORAL


 Temp >100.5  4/12/20 13:15


 5/11/20 13:14   


 


 


 Acetaminophen


  (Tylenol)  650 mg  Q4H  PRN


 ORAL


 For Pain  4/12/20 14:00


 5/11/20 13:59   


 


 


 Atorvastatin


 Calcium


  (Lipitor)  10 mg  BEDTIME


 ORAL


   4/14/20 21:00


 7/10/20 20:59   


 


 


 Dextrose  1,000 ml @ 


 100 mls/hr  Q10H


 IV


   4/12/20 13:15


 5/12/20 09:59  4/14/20 05:58


 


 


 Dextrose


  (Dextrose 50%)  25 ml  Q30M  PRN


 IV


 Hypoglycemia  4/12/20 13:15


 7/9/20 21:44   


 


 


 Dextrose


  (Dextrose 50%)  50 ml  Q30M  PRN


 IV


 Hypoglycemia  4/12/20 13:15


 7/9/20 21:44   


 


 


 Insulin Aspart


  (NovoLOG)    AC+HS


 SUBQ


   4/12/20 16:30


 7/10/20 09:29  4/14/20 18:10


 


 


 Insulin Aspart


  (NovoLOG)  8 units  NOVOTIAC


 SUBQ


   4/13/20 12:00


 7/11/20 11:49  4/14/20 06:27


 


 


 Insulin Detemir


  (Levemir)  16 units  QHS


 SUBQ


   4/13/20 21:00


 7/11/20 20:59  4/13/20 21:00


 


 


 Levetiracetam


  (Keppra)  250 mg  Q12HR


 ORAL


   4/14/20 21:00


 5/29/20 20:59   


 


 


 Meropenem 1 gm/


 Sodium Chloride  100 ml @ 


 200 mls/hr  Q12HR


 IVPB


   4/12/20 21:00


 4/17/20 20:59  4/14/20 09:56


 


 


 Metoprolol


 Tartrate


  (Lopressor)  25 mg  Q12HR


 ORAL


   4/12/20 21:00


 7/10/20 20:59  4/14/20 09:57


 


 


 Pantoprazole


  (Protonix)  40 mg  DAILY


 ORAL


   4/14/20 09:00


 5/14/20 08:59  4/14/20 09:57


 


 


 Potassium Chloride


  (K-Dur)  40 meq  TID


 ORAL


   4/13/20 09:15


 7/12/20 09:14  4/14/20 18:06


 


 


 Tramadol HCl


  (Ultram)  25 mg  Q6H  PRN


 ORAL


 Severe Breakthru Pain (>7)  4/12/20 14:00


 4/18/20 13:59   


 


 


 Vancomycin HCl


  (Firvanq)  125 mg  FOUR TIMES A  DAY


 ORAL


   4/14/20 21:00


 4/24/20 20:59   


 

















Brijesh Looney MD Apr 14, 2020 20:51

## 2020-04-14 NOTE — DIAGNOSTIC IMAGING REPORT
Indications: Right-sided weakness

 

Technique: Spiral acquisitions obtained through the brain. Angled axial and coronal 5

x 5 mm slices were reconstructed. Total dose length product 1072 mGycm.  CTDI vol(s)

53 mGy. Dose reduction achieved using automated exposure control

 

Comparison: 4/11/2020

 

Findings: Small subdural hematoma is again demonstrated along the left side of the

posterior falx, and extending along the left right of the tentorium and to a slight

extent along the right tentorium this is unchanged and does not result in any

significant mass effect.

 

No new intracranial hemorrhage. No edema. No mass effect nor midline shift. There is

cortical volume loss, predominantly frontal and parietal, as well as mild central

volume loss. Gray-white differentiation is normal.

 

There is minimal mastoid disease. The calvarium is intact. There is evidence of prior

bilateral cataract surgery. The visualized sinuses are clear.

 

Impression: Stable, since prior exam of 4/11/2020, small left parafalcine and

bilateral left greater than right peritentorial subdural hematoma, not exhibiting any

significant mass effect

 

Negative for new intracranial bleed. No mass effect

 

Age-related volume loss

 

 

 

The CT scanner at [facility] is accredited by the [employer] College of

Radiology and the scans are performed using protocols designed to limit radiation

exposure to as low as reasonably achievable to attain images of sufficient resolution

adequate for diagnostic evaluation.

## 2020-04-14 NOTE — NUR
PT Note

PT samantha completed, treatment initiated. Patient's LE's are flaccid. RN is aware. Patient can 
benefit from PT services to increase her muscle strength and balance to improve her 
functional mobility and gait. 

-------------------------------------------------------------------------------

Addendum: 04/14/20 at 1654 by MASON LEA PT

-------------------------------------------------------------------------------

Amended: Links added.

## 2020-04-14 NOTE — GENERAL PROGRESS NOTE
Assessment/Plan


Problem List:  


(1) Abnormal thyroid blood test


ICD Codes:  R79.89 - Other specified abnormal findings of blood chemistry


SNOMED:  190064801, 095683245765689


(2) Diabetes mellitus out of control


ICD Codes:  E11.65 - Type 2 diabetes mellitus with hyperglycemia


SNOMED:  65108465, 422900743


(3) DKA (diabetic ketoacidoses)


ICD Codes:  E11.10 - Type 2 diabetes mellitus with ketoacidosis without coma


SNOMED:  342603198, 52432018


(4) STEMI (ST elevation myocardial infarction)


ICD Codes:  I21.3 - ST elevation (STEMI) myocardial infarction of unspecified 

site


SNOMED:  090633493


(5) Prolonged Q-T interval on ECG


ICD Codes:  R94.31 - Abnormal electrocardiogram [ECG] [EKG]


SNOMED:  068508335


(6) Atrial fibrillation with rapid ventricular response


ICD Codes:  I48.91 - Unspecified atrial fibrillation


SNOMED:  376918182124099


(7) Hypernatremia


ICD Codes:  E87.0 - Hyperosmolality and hypernatremia


SNOMED:  009226431, 46070087


Assessment/Plan:


continue Levemir 16 units qhs 


continue Novolog 8 units ac tid 


continue NISS ac / hs 





continue to hold Levothyroxine





Subjective


Allergies:  


Coded Allergies:  


     No Known Allergies (Unverified , 4/15/15)


All Systems:  reviewed and negative except above


Subjective


events noted 


interval notes reviewed 











Item Value  Date Time


 


Bedside Blood Glucose 233 mg/dl H 4/14/20 1350


 


Bedside Blood Glucose 158 mg/dl H 4/14/20 0628


 


Bedside Blood Glucose 192 mg/dl H 4/13/20 2100


 


Bedside Blood Glucose 308 mg/dl H 4/13/20 1850


 


Bedside Blood Glucose 288 mg/dl H 4/13/20 1418











Objective





Last 24 Hour Vital Signs








  Date Time  Temp Pulse Resp B/P (MAP) Pulse Ox O2 Delivery O2 Flow Rate FiO2


 


4/14/20 15:00 98.1 91 18 119/66 (83) 95   


 


4/14/20 09:57  95  117/67    


 


4/14/20 09:00      Room Air  


 


4/14/20 08:00 99.1 95 18 117/67 (84) 93   


 


4/14/20 04:34 99.5 97 26 125/71 (89) 98   


 


4/14/20 03:39  95      


 


4/14/20 00:00 98.9 90 24 112/60 (77) 96   


 


4/13/20 23:29  87      


 


4/13/20 21:00  90  108/62    


 


4/13/20 21:00      Room Air  


 


4/13/20 20:00  95      


 


4/13/20 20:00 101.1 95 28 110/62 (78) 93   


 


4/13/20 16:00 98.4 99 20 119/72 (88) 94   


 


4/13/20 16:00  93      

















Intake and Output  


 


 4/13/20 4/14/20





 19:00 07:00


 


  


 


# Voids 3 


 


# Bowel Movements 1 2








Laboratory Tests


4/14/20 07:40: 


White Blood Count 6.3, Red Blood Count 3.57L, Hemoglobin 11.0L, Hematocrit 32.2L

, Mean Corpuscular Volume 90, Mean Corpuscular Hemoglobin 30.9, Mean 

Corpuscular Hemoglobin Concent 34.2, Red Cell Distribution Width 12.3, Platelet 

Count 52L, Mean Platelet Volume 8.1, Neutrophils (%) (Auto) , Lymphocytes (%) (

Auto) , Monocytes (%) (Auto) , Eosinophils (%) (Auto) , Basophils (%) (Auto) , 

Differential Total Cells Counted 100, Neutrophils % (Manual) 80H, Lymphocytes % 

(Manual) 16L, Monocytes % (Manual) 1, Eosinophils % (Manual) 1, Basophils % (

Manual) 0, Band Neutrophils 2, Platelet Estimate DecreasedL, Platelet 

Morphology Normal, Red Blood Cell Morphology Normal, Sodium Level 159H, 

Potassium Level 3.0L, Chloride Level 122H, Carbon Dioxide Level 27, Blood Urea 

Nitrogen 12, Creatinine 0.5L, Estimat Glomerular Filtration Rate > 60, Glucose 

Level 137#H, Uric Acid 1.6L, Calcium Level 7.2L, Phosphorus Level 2.3L, 

Magnesium Level 1.9, Total Bilirubin 0.2, Aspartate Amino Transf (AST/SGOT) 46H

, Alanine Aminotransferase (ALT/SGPT) 29, Alkaline Phosphatase 98, Troponin I 

0.045, C-Reactive Protein, Quantitative 14.1H, Pro-B-Type Natriuretic Peptide 

436H, Total Protein 5.6L, Albumin 1.4L, Globulin 4.2, Albumin/Globulin Ratio 

0.3L


Height (Feet):  5


Height (Inches):  4.00


Weight (Pounds):  110


General Appearance:  no apparent distress


Neck:  normal alignment


Cardiovascular:  normal rate


Respiratory/Chest:  lungs clear


Abdomen:  normal bowel sounds


Objective





Current Medications








 Medications


  (Trade)  Dose


 Ordered  Sig/Meghan


 Route


 PRN Reason  Start Time


 Stop Time Status Last Admin


Dose Admin


 


 Acetaminophen


  (Tylenol)  650 mg  Q4H  PRN


 ORAL


 Temp >100.5  4/12/20 13:15


 5/11/20 13:14   


 


 


 Acetaminophen


  (Tylenol)  650 mg  Q4H  PRN


 ORAL


 For Pain  4/12/20 14:00


 5/11/20 13:59   


 


 


 Atorvastatin


 Calcium


  (Lipitor)  40 mg  BEDTIME


 ORAL


   4/12/20 21:00


 7/10/20 20:59  4/13/20 22:19


 


 


 Dextrose  1,000 ml @ 


 100 mls/hr  Q10H


 IV


   4/12/20 13:15


 5/12/20 09:59  4/14/20 05:58


 


 


 Dextrose


  (Dextrose 50%)  25 ml  Q30M  PRN


 IV


 Hypoglycemia  4/12/20 13:15


 7/9/20 21:44   


 


 


 Dextrose


  (Dextrose 50%)  50 ml  Q30M  PRN


 IV


 Hypoglycemia  4/12/20 13:15


 7/9/20 21:44   


 


 


 Insulin Aspart


  (NovoLOG)    AC+HS


 SUBQ


   4/12/20 16:30


 7/10/20 09:29  4/14/20 13:50


 


 


 Insulin Aspart


  (NovoLOG)  8 units  NOVOTIAC


 SUBQ


   4/13/20 12:00


 7/11/20 11:49  4/14/20 06:27


 


 


 Insulin Detemir


  (Levemir)  16 units  QHS


 SUBQ


   4/13/20 21:00


 7/11/20 20:59  4/13/20 21:00


 


 


 Levetiracetam


  (Keppra)  250 mg  Q12HR


 ORAL


   4/14/20 21:00


 5/29/20 20:59   


 


 


 Meropenem 1 gm/


 Sodium Chloride  100 ml @ 


 200 mls/hr  Q12HR


 IVPB


   4/12/20 21:00


 4/17/20 20:59  4/14/20 09:56


 


 


 Metoprolol


 Tartrate


  (Lopressor)  25 mg  Q12HR


 ORAL


   4/12/20 21:00


 7/10/20 20:59  4/14/20 09:57


 


 


 Pantoprazole


  (Protonix)  40 mg  DAILY


 ORAL


   4/14/20 09:00


 5/14/20 08:59  4/14/20 09:57


 


 


 Phosphorus


  (Phospha 250


 Neutral)  250 mg  THREE TIMES A  DAY


 ORAL


   4/12/20 18:00


 5/11/20 12:59  4/14/20 13:23


 


 


 Potassium


 Phosphate 30 mm/


 Sodium Chloride  285 ml @ 


 46.944 mls/


 hr  ONCE  ONCE


 IV


   4/14/20 11:00


 4/14/20 17:04  4/14/20 13:25


 


 


 Potassium Chloride


  (K-Dur)  40 meq  TID


 ORAL


   4/13/20 09:15


 7/12/20 09:14  4/14/20 13:23


 


 


 Tramadol HCl


  (Ultram)  25 mg  Q6H  PRN


 ORAL


 Severe Breakthru Pain (>7)  4/12/20 14:00


 4/18/20 13:59   


 

















Kevin Mohr MD Apr 14, 2020 15:16

## 2020-04-14 NOTE — NUR
NURSE NOTES:WOUND CARE NOTES:PT noted to have rapid onset Sacral DTPI. Per Primary Nurse pt 
noted on previous shift to have non-blanching erythema and preventive measures were taken to 
reposition pt and off-load pressure.  Despite repositioning and applying Moisture Barrier 
Paste pt noted to have further skin decline. Sacral DTPI is irregular shaped, Purple and 
indurated in colour over sacrococcygeal with surrounding non-blanching erythema along 
borders.(L)7.1cm x (W)6.5cm. Nutritional intake varies 30-45% per Primary nurse. 
Non-Blanching erythema without fluctuance noted to R and L heels. Both heels observed 
floated with pillows Off mattress.



Tx.Plan: Apply Moisture Barrier Paste to Sacrum. Cover with Optifoam drsg. Change every 3 
days and prn.

            Apply Cavilon Skin Barrier to both heels. Cover each heel with Optifoam drsg. 
Change every 7 days and prn.

            Reposition at least every 2hours or as tolerated.

            Off-load heels with pillow.

            APM/VIK Mattress overlay.

## 2020-04-14 NOTE — NEPHROLOGY PROGRESS NOTE
Assessment/Plan


Problem List:  


(1) Hypernatremia


Assessment:  Improving





(2) UTI (urinary tract infection)


(3) Subdural hematoma


(4) DKA (diabetic ketoacidoses)


(5) Electrolyte imbalance


Assessment





Hypernatremia most likely due to free water deficit


Hypokalemia, hypophosphatemia


Hyperglycemia and DKA


Evidence of UTI


Right-sided weakness with 3 mm subdural hematoma found in CT scan


Plan


Change Protonix to p.o.


Stop Neutra-Phos due to diarrhea


Hypotonic IV solution, IV was changed to D5W


Potassium and phosphorus and magnesium supplement as needed


Diet started as the patient is more alert


Low dose of Lopressor


Monitor renal parameters and electrolytes


Neuro eval noted


Keep the blood pressure and blood sugar in check


Urine studies


Per orders





Subjective


ROS Limited/Unobtainable:  No


Constitutional:  Reports: malaise





Objective


Objective





Last 24 Hour Vital Signs








  Date Time  Temp Pulse Resp B/P (MAP) Pulse Ox O2 Delivery O2 Flow Rate FiO2


 


4/14/20 15:00 98.1 91 18 119/66 (83) 95   


 


4/14/20 09:57  95  117/67    


 


4/14/20 09:00      Room Air  


 


4/14/20 08:00 99.1 95 18 117/67 (84) 93   


 


4/14/20 04:34 99.5 97 26 125/71 (89) 98   


 


4/14/20 03:39  95      


 


4/14/20 00:00 98.9 90 24 112/60 (77) 96   


 


4/13/20 23:29  87      


 


4/13/20 21:00  90  108/62    


 


4/13/20 21:00      Room Air  


 


4/13/20 20:00  95      


 


4/13/20 20:00 101.1 95 28 110/62 (78) 93   

















Intake and Output  


 


 4/13/20 4/14/20





 19:00 07:00


 


  


 


# Voids 3 


 


# Bowel Movements 1 2











Current Medications








 Medications


  (Trade)  Dose


 Ordered  Sig/Meghan


 Route


 PRN Reason  Start Time


 Stop Time Status Last Admin


Dose Admin


 


 Acetaminophen


  (Tylenol)  650 mg  Q4H  PRN


 ORAL


 Temp >100.5  4/12/20 13:15


 5/11/20 13:14   


 


 


 Acetaminophen


  (Tylenol)  650 mg  Q4H  PRN


 ORAL


 For Pain  4/12/20 14:00


 5/11/20 13:59   


 


 


 Atorvastatin


 Calcium


  (Lipitor)  10 mg  BEDTIME


 ORAL


   4/14/20 21:00


 7/10/20 20:59 UNV  


 


 


 Dextrose  1,000 ml @ 


 100 mls/hr  Q10H


 IV


   4/12/20 13:15


 5/12/20 09:59  4/14/20 05:58


 


 


 Dextrose


  (Dextrose 50%)  25 ml  Q30M  PRN


 IV


 Hypoglycemia  4/12/20 13:15


 7/9/20 21:44   


 


 


 Dextrose


  (Dextrose 50%)  50 ml  Q30M  PRN


 IV


 Hypoglycemia  4/12/20 13:15


 7/9/20 21:44   


 


 


 Insulin Aspart


  (NovoLOG)    AC+HS


 SUBQ


   4/12/20 16:30


 7/10/20 09:29  4/14/20 18:10


 


 


 Insulin Aspart


  (NovoLOG)  8 units  NOVOTIAC


 SUBQ


   4/13/20 12:00


 7/11/20 11:49  4/14/20 06:27


 


 


 Insulin Detemir


  (Levemir)  16 units  QHS


 SUBQ


   4/13/20 21:00


 7/11/20 20:59  4/13/20 21:00


 


 


 Levetiracetam


  (Keppra)  250 mg  Q12HR


 ORAL


   4/14/20 21:00


 5/29/20 20:59   


 


 


 Meropenem 1 gm/


 Sodium Chloride  100 ml @ 


 200 mls/hr  Q12HR


 IVPB


   4/12/20 21:00


 4/17/20 20:59  4/14/20 09:56


 


 


 Metoprolol


 Tartrate


  (Lopressor)  25 mg  Q12HR


 ORAL


   4/12/20 21:00


 7/10/20 20:59  4/14/20 09:57


 


 


 Pantoprazole


  (Protonix)  40 mg  DAILY


 ORAL


   4/14/20 09:00


 5/14/20 08:59  4/14/20 09:57


 


 


 Potassium Chloride


  (K-Dur)  40 meq  TID


 ORAL


   4/13/20 09:15


 7/12/20 09:14  4/14/20 18:06


 


 


 Tramadol HCl


  (Ultram)  25 mg  Q6H  PRN


 ORAL


 Severe Breakthru Pain (>7)  4/12/20 14:00


 4/18/20 13:59   


 


 


 Vancomycin HCl


  (Firvanq)  125 mg  FOUR TIMES A  DAY


 ORAL


   4/14/20 21:00


 4/24/20 20:59   


 





Laboratory Tests


4/14/20 07:40: 


White Blood Count 6.3, Red Blood Count 3.57L, Hemoglobin 11.0L, Hematocrit 32.2L

, Mean Corpuscular Volume 90, Mean Corpuscular Hemoglobin 30.9, Mean 

Corpuscular Hemoglobin Concent 34.2, Red Cell Distribution Width 12.3, Platelet 

Count 52L, Mean Platelet Volume 8.1, Neutrophils (%) (Auto) , Lymphocytes (%) (

Auto) , Monocytes (%) (Auto) , Eosinophils (%) (Auto) , Basophils (%) (Auto) , 

Differential Total Cells Counted 100, Neutrophils % (Manual) 80H, Lymphocytes % 

(Manual) 16L, Monocytes % (Manual) 1, Eosinophils % (Manual) 1, Basophils % (

Manual) 0, Band Neutrophils 2, Platelet Estimate DecreasedL, Platelet 

Morphology Normal, Red Blood Cell Morphology Normal, Sodium Level 159H, 

Potassium Level 3.0L, Chloride Level 122H, Carbon Dioxide Level 27, Blood Urea 

Nitrogen 12, Creatinine 0.5L, Estimat Glomerular Filtration Rate > 60, Glucose 

Level 137#H, Uric Acid 1.6L, Calcium Level 7.2L, Phosphorus Level 2.3L, 

Magnesium Level 1.9, Total Bilirubin 0.2, Aspartate Amino Transf (AST/SGOT) 46H

, Alanine Aminotransferase (ALT/SGPT) 29, Alkaline Phosphatase 98, Troponin I 

0.045, C-Reactive Protein, Quantitative 14.1H, Pro-B-Type Natriuretic Peptide 

436H, Total Protein 5.6L, Albumin 1.4L, Globulin 4.2, Albumin/Globulin Ratio 

0.3L


Height (Feet):  5


Height (Inches):  4.00


Weight (Pounds):  110


General Appearance:  no apparent distress


Objective


No change











Jonathan Jacobson MD Apr 14, 2020 18:43

## 2020-04-14 NOTE — NUR
NURSE NOTES:

report given to Trang/RN pt in stable condition needs to be turned and changed every 2hrs.

## 2020-04-15 VITALS — SYSTOLIC BLOOD PRESSURE: 107 MMHG | DIASTOLIC BLOOD PRESSURE: 71 MMHG

## 2020-04-15 VITALS — SYSTOLIC BLOOD PRESSURE: 126 MMHG | DIASTOLIC BLOOD PRESSURE: 67 MMHG

## 2020-04-15 VITALS — DIASTOLIC BLOOD PRESSURE: 63 MMHG | SYSTOLIC BLOOD PRESSURE: 95 MMHG

## 2020-04-15 VITALS — SYSTOLIC BLOOD PRESSURE: 124 MMHG | DIASTOLIC BLOOD PRESSURE: 66 MMHG

## 2020-04-15 VITALS — DIASTOLIC BLOOD PRESSURE: 73 MMHG | SYSTOLIC BLOOD PRESSURE: 147 MMHG

## 2020-04-15 VITALS — SYSTOLIC BLOOD PRESSURE: 136 MMHG | DIASTOLIC BLOOD PRESSURE: 68 MMHG

## 2020-04-15 VITALS — SYSTOLIC BLOOD PRESSURE: 121 MMHG | DIASTOLIC BLOOD PRESSURE: 61 MMHG

## 2020-04-15 LAB
ADD MANUAL DIFF: YES
ALBUMIN SERPL-MCNC: 1.6 G/DL (ref 3.4–5)
ALBUMIN/GLOB SERPL: 0.4 {RATIO} (ref 1–2.7)
ALP SERPL-CCNC: 122 U/L (ref 46–116)
ALT SERPL-CCNC: 36 U/L (ref 12–78)
ANION GAP SERPL CALC-SCNC: 9 MMOL/L (ref 5–15)
AST SERPL-CCNC: 45 U/L (ref 15–37)
BILIRUB SERPL-MCNC: 0.3 MG/DL (ref 0.2–1)
BUN SERPL-MCNC: 9 MG/DL (ref 7–18)
CALCIUM SERPL-MCNC: 7.9 MG/DL (ref 8.5–10.1)
CHLORIDE SERPL-SCNC: 115 MMOL/L (ref 98–107)
CO2 SERPL-SCNC: 27 MMOL/L (ref 21–32)
CREAT SERPL-MCNC: 0.5 MG/DL (ref 0.55–1.3)
ERYTHROCYTE [DISTWIDTH] IN BLOOD BY AUTOMATED COUNT: 12 % (ref 11.6–14.8)
GLOBULIN SER-MCNC: 4.1 G/DL
HCT VFR BLD CALC: 32.8 % (ref 37–47)
HGB BLD-MCNC: 11.7 G/DL (ref 12–16)
MCV RBC AUTO: 90 FL (ref 80–99)
PHOSPHATE SERPL-MCNC: 2.2 MG/DL (ref 2.5–4.9)
PLATELET # BLD: 50 K/UL (ref 150–450)
POTASSIUM SERPL-SCNC: 3.9 MMOL/L (ref 3.5–5.1)
RBC # BLD AUTO: 3.66 M/UL (ref 4.2–5.4)
SODIUM SERPL-SCNC: 151 MMOL/L (ref 136–145)
WBC # BLD AUTO: 6 K/UL (ref 4.8–10.8)

## 2020-04-15 RX ADMIN — VANCOMYCIN HYDROCHLORIDE SCH MG: 500 INJECTION, POWDER, LYOPHILIZED, FOR SOLUTION INTRAVENOUS at 11:36

## 2020-04-15 RX ADMIN — INSULIN ASPART SCH UNITS: 100 INJECTION, SOLUTION INTRAVENOUS; SUBCUTANEOUS at 00:03

## 2020-04-15 RX ADMIN — VANCOMYCIN HYDROCHLORIDE SCH MG: 500 INJECTION, POWDER, LYOPHILIZED, FOR SOLUTION INTRAVENOUS at 13:00

## 2020-04-15 RX ADMIN — INSULIN ASPART SCH UNITS: 100 INJECTION, SOLUTION INTRAVENOUS; SUBCUTANEOUS at 17:15

## 2020-04-15 RX ADMIN — VANCOMYCIN HYDROCHLORIDE SCH MG: 500 INJECTION, POWDER, LYOPHILIZED, FOR SOLUTION INTRAVENOUS at 21:00

## 2020-04-15 RX ADMIN — INSULIN ASPART SCH UNITS: 100 INJECTION, SOLUTION INTRAVENOUS; SUBCUTANEOUS at 06:36

## 2020-04-15 RX ADMIN — SODIUM CHLORIDE SCH MLS/HR: 0.9 INJECTION INTRAVENOUS at 21:00

## 2020-04-15 RX ADMIN — INSULIN ASPART SCH UNITS: 100 INJECTION, SOLUTION INTRAVENOUS; SUBCUTANEOUS at 11:50

## 2020-04-15 RX ADMIN — VANCOMYCIN HYDROCHLORIDE SCH MG: 500 INJECTION, POWDER, LYOPHILIZED, FOR SOLUTION INTRAVENOUS at 18:20

## 2020-04-15 RX ADMIN — INSULIN ASPART SCH UNITS: 100 INJECTION, SOLUTION INTRAVENOUS; SUBCUTANEOUS at 11:30

## 2020-04-15 RX ADMIN — INSULIN ASPART SCH UNITS: 100 INJECTION, SOLUTION INTRAVENOUS; SUBCUTANEOUS at 17:12

## 2020-04-15 RX ADMIN — Medication SCH MG: at 18:20

## 2020-04-15 RX ADMIN — INSULIN ASPART SCH UNITS: 100 INJECTION, SOLUTION INTRAVENOUS; SUBCUTANEOUS at 06:27

## 2020-04-15 NOTE — NUR
*-* INSURANCE *-*



ALL CLINICALS AND REVIEWS HAVE BEEN FAXED TO:



[facility]: ROBERT WAITE

T: [phone] (LEAVE A MESSAGE)

F: [phone]

## 2020-04-15 NOTE — GENERAL PROGRESS NOTE
Assessment/Plan


Problem List:  


(1) Abnormal thyroid blood test


ICD Codes:  R79.89 - Other specified abnormal findings of blood chemistry


SNOMED:  411926989, 747353494188722


(2) Diabetes mellitus out of control


ICD Codes:  E11.65 - Type 2 diabetes mellitus with hyperglycemia


SNOMED:  74044015, 214931022


(3) DKA (diabetic ketoacidoses)


ICD Codes:  E11.10 - Type 2 diabetes mellitus with ketoacidosis without coma


SNOMED:  571780448, 76180564


(4) STEMI (ST elevation myocardial infarction)


ICD Codes:  I21.3 - ST elevation (STEMI) myocardial infarction of unspecified 

site


SNOMED:  185726542


(5) Prolonged Q-T interval on ECG


ICD Codes:  R94.31 - Abnormal electrocardiogram [ECG] [EKG]


SNOMED:  110912163


(6) Atrial fibrillation with rapid ventricular response


ICD Codes:  I48.91 - Unspecified atrial fibrillation


SNOMED:  633698606660103


(7) Hypernatremia


ICD Codes:  E87.0 - Hyperosmolality and hypernatremia


SNOMED:  465883138, 77749010


Assessment/Plan:


increase Levemir to 24 units qhs 


increase Novolog to 10 units ac tid 


continue NISS ac / hs 





continue to hold Levothyroxine





Subjective


Allergies:  


Coded Allergies:  


     No Known Allergies (Unverified , 4/15/15)


Subjective


events noted 


interval notes reviewed 


glucose values are elevated in 200 range 











Item Value  Date Time


 


Bedside Blood Glucose 209 mg/dl H 4/15/20 0636


 


Bedside Blood Glucose 265 mg/dl H 4/15/20 0003


 


Bedside Blood Glucose 265 mg/dl H 4/14/20 2100


 


Bedside Blood Glucose 195 mg/dl H 4/14/20 1810


 


Bedside Blood Glucose 233 mg/dl H 4/14/20 1350


 


Bedside Blood Glucose 158 mg/dl H 4/14/20 0628











Objective





Last 24 Hour Vital Signs








  Date Time  Temp Pulse Resp B/P (MAP) Pulse Ox O2 Delivery O2 Flow Rate FiO2


 


4/15/20 04:00  89      


 


4/15/20 00:00  96      


 


4/14/20 23:01  92  118/70    


 


4/14/20 20:00      Room Air  


 


4/14/20 20:00  94      


 


4/14/20 16:00  87      


 


4/14/20 15:00 98.1 91 18 119/66 (83) 95   


 


4/14/20 12:00  84      


 


4/14/20 09:57  95  117/67    


 


4/14/20 09:05      Room Air  


 


4/14/20 09:00      Room Air  

















Intake and Output  


 


 4/14/20 4/15/20





 19:00 07:00


 


Intake Total 1560 ml 


 


Output Total 2300 ml 


 


Balance -740 ml 


 


  


 


Intake Oral 1560 ml 


 


Output Urine Total 2300 ml 


 


# Bowel Movements 9 2








Laboratory Tests


4/15/20 05:55: 


White Blood Count 6.0, Red Blood Count 3.66L, Hemoglobin 11.7L, Hematocrit 32.8L

, Mean Corpuscular Volume 90, Mean Corpuscular Hemoglobin 31.9H, Mean 

Corpuscular Hemoglobin Concent 35.6, Red Cell Distribution Width 12.0, Platelet 

Count 50L, Mean Platelet Volume 8.3, Neutrophils (%) (Auto) , Lymphocytes (%) (

Auto) , Monocytes (%) (Auto) , Eosinophils (%) (Auto) , Basophils (%) (Auto) , 

Neutrophils % (Manual) [Pending], Lymphocytes % (Manual) [Pending], Platelet 

Estimate [Pending], Platelet Morphology [Pending], Sodium Level 151H, Potassium 

Level 3.9, Chloride Level 115H, Carbon Dioxide Level 27, Anion Gap 9, Blood 

Urea Nitrogen 9, Creatinine 0.5L, Estimat Glomerular Filtration Rate > 60, 

Glucose Level 247#H, Calcium Level 7.9L, Phosphorus Level 2.2L, Magnesium Level 

2.3, Total Bilirubin 0.3, Aspartate Amino Transf (AST/SGOT) 45H, Alanine 

Aminotransferase (ALT/SGPT) 36, Alkaline Phosphatase 122H, C-Reactive Protein, 

Quantitative 12.0H, Total Protein 5.7L, Albumin 1.6L, Globulin 4.1, Albumin/

Globulin Ratio 0.4L


Height (Feet):  5


Height (Inches):  4.00


Weight (Pounds):  110


General Appearance:  no apparent distress


Neck:  normal alignment


Cardiovascular:  normal rate


Respiratory/Chest:  lungs clear


Objective





Current Medications








 Medications


  (Trade)  Dose


 Ordered  Sig/Meghan


 Route


 PRN Reason  Start Time


 Stop Time Status Last Admin


Dose Admin


 


 Acetaminophen


  (Tylenol)  650 mg  Q4H  PRN


 ORAL


 Temp >100.5  4/12/20 13:15


 5/11/20 13:14   


 


 


 Acetaminophen


  (Tylenol)  650 mg  Q4H  PRN


 ORAL


 For Pain  4/12/20 14:00


 5/11/20 13:59   


 


 


 Atorvastatin


 Calcium


  (Lipitor)  10 mg  BEDTIME


 ORAL


   4/14/20 21:00


 7/10/20 20:59  4/14/20 23:00


 


 


 Ceftriaxone


 Sodium 1 gm/


 Dextrose  50 ml @ 


 100 mls/hr  Q24H


 IVPB


   4/14/20 21:00


 4/21/20 20:59  4/14/20 21:00


 


 


 Dextrose  1,000 ml @ 


 100 mls/hr  Q10H


 IV


   4/12/20 13:15


 5/12/20 09:59  4/15/20 00:20


 


 


 Dextrose


  (Dextrose 50%)  25 ml  Q30M  PRN


 IV


 Hypoglycemia  4/12/20 13:15


 7/9/20 21:44   


 


 


 Dextrose


  (Dextrose 50%)  50 ml  Q30M  PRN


 IV


 Hypoglycemia  4/12/20 13:15


 7/9/20 21:44   


 


 


 Insulin Aspart


  (NovoLOG)    AC+HS


 SUBQ


   4/12/20 16:30


 7/10/20 09:29  4/15/20 06:27


 


 


 Insulin Aspart


  (NovoLOG)  8 units  NOVOTIAC


 SUBQ


   4/13/20 12:00


 7/11/20 11:49  4/15/20 06:36


 


 


 Insulin Detemir


  (Levemir)  16 units  QHS


 SUBQ


   4/13/20 21:00


 7/11/20 20:59  4/14/20 23:45


 


 


 Levetiracetam


  (Keppra)  250 mg  Q12HR


 ORAL


   4/14/20 21:00


 5/29/20 20:59  4/14/20 23:00


 


 


 Metoprolol


 Tartrate


  (Lopressor)  25 mg  Q12HR


 ORAL


   4/12/20 21:00


 7/10/20 20:59  4/14/20 23:01


 


 


 Pantoprazole


  (Protonix)  40 mg  BID


 ORAL


   4/15/20 09:00


 5/14/20 08:59   


 


 


 Potassium


 Phosphate 20 mm/


 Sodium Chloride  281.6667


 ml @ 


 46.944 m...  ONCE  ONCE


 IV


   4/15/20 09:30


 4/15/20 15:29   


 


 


 Potassium Chloride


  (K-Dur)  40 meq  BID


 ORAL


   4/15/20 09:00


 7/12/20 09:14   


 


 


 Tramadol HCl


  (Ultram)  25 mg  Q6H  PRN


 ORAL


 Severe Breakthru Pain (>7)  4/12/20 14:00


 4/18/20 13:59   


 


 


 Vancomycin HCl


  (Firvanq)  125 mg  FOUR TIMES A  DAY


 ORAL


   4/14/20 21:00


 4/24/20 20:59  4/14/20 23:00


 

















Kevin Mohr MD Apr 15, 2020 08:57

## 2020-04-15 NOTE — NEUROLOGY PROGRESS NOTE
Interim History


Interim History


ROS Limited/Unobtainable:  No


Interim History


today she is more alert and interactive


Remains weak in LEs, flaccid





Objective


Physical Exam





Last Vital Signs








  Date Time  Temp Pulse Resp B/P (MAP) Pulse Ox O2 Delivery O2 Flow Rate FiO2


 


4/15/20 16:00 98.6 101 20 121/61 (81) 96   


 


4/15/20 16:00      Room Air  











Laboratory Tests








Test


  4/15/20


05:55


 


White Blood Count


  6.0 K/UL


(4.8-10.8)


 


Red Blood Count


  3.66 M/UL


(4.20-5.40)  L


 


Hemoglobin


  11.7 G/DL


(12.0-16.0)  L


 


Hematocrit


  32.8 %


(37.0-47.0)  L


 


Mean Corpuscular Volume 90 FL (80-99)  


 


Mean Corpuscular Hemoglobin


  31.9 PG


(27.0-31.0)  H


 


Mean Corpuscular Hemoglobin


Concent 35.6 G/DL


(32.0-36.0)


 


Red Cell Distribution Width


  12.0 %


(11.6-14.8)


 


Platelet Count


  50 K/UL


(150-450)  L


 


Mean Platelet Volume


  8.3 FL


(6.5-10.1)


 


Neutrophils (%) (Auto)


  % (45.0-75.0)


 


 


Lymphocytes (%) (Auto)


  % (20.0-45.0)


 


 


Monocytes (%) (Auto)  % (1.0-10.0)  


 


Eosinophils (%) (Auto)  % (0.0-3.0)  


 


Basophils (%) (Auto)  % (0.0-2.0)  


 


Differential Total Cells


Counted 100  


 


 


Neutrophils % (Manual) 67 % (45-75)  


 


Lymphocytes % (Manual) 26 % (20-45)  


 


Monocytes % (Manual) 6 % (1-10)  


 


Eosinophils % (Manual) 1 % (0-3)  


 


Basophils % (Manual) 0 % (0-2)  


 


Band Neutrophils 0 % (0-8)  


 


Platelet Estimate Decreased  L


 


Platelet Morphology Normal  


 


Hypochromasia 1+  


 


Anisocytosis 1+  


 


Sodium Level


  151 MMOL/L


(136-145)  H


 


Potassium Level


  3.9 MMOL/L


(3.5-5.1)


 


Chloride Level


  115 MMOL/L


()  H


 


Carbon Dioxide Level


  27 MMOL/L


(21-32)


 


Anion Gap


  9 mmol/L


(5-15)


 


Blood Urea Nitrogen


  9 mg/dL (7-18)


 


 


Creatinine


  0.5 MG/DL


(0.55-1.30)  L


 


Estimat Glomerular Filtration


Rate > 60 mL/min


(>60)


 


Glucose Level


  247 MG/DL


()  #H


 


Calcium Level


  7.9 MG/DL


(8.5-10.1)  L


 


Phosphorus Level


  2.2 MG/DL


(2.5-4.9)  L


 


Magnesium Level


  2.3 MG/DL


(1.8-2.4)


 


Total Bilirubin


  0.3 MG/DL


(0.2-1.0)


 


Aspartate Amino Transf


(AST/SGOT) 45 U/L (15-37)


H


 


Alanine Aminotransferase


(ALT/SGPT) 36 U/L (12-78)


 


 


Alkaline Phosphatase


  122 U/L


()  H


 


C-Reactive Protein,


Quantitative 12.0 mg/dL


(0.00-0.90)  H


 


Total Protein


  5.7 G/DL


(6.4-8.2)  L


 


Albumin


  1.6 G/DL


(3.4-5.0)  L


 


Globulin 4.1 g/dL  


 


Albumin/Globulin Ratio


  0.4 (1.0-2.7)


L








Head:  normocophalic


Neck:  no rigidity


EENT:  benign





Neurologic Exam


Mental Status:  awake


Speech:  normal speech


Language:  normal language


Cranial Nerve VII:  no facial asymmetry


Objective


alert, oriented x 2, conversant


UEs 5/5


LEs 1/5 flaccid 


cc 35 min





Impression/Recommendations


Problems:  


(1) STEMI (ST elevation myocardial infarction)


(2) Atrial fibrillation with rapid ventricular response


(3) Prolonged Q-T interval on ECG


(4) DKA (diabetic ketoacidoses)


(5) Hypernatremia


(6) UTI (urinary tract infection)


(7) Subdural hematoma


Diagnostic Impression





Given LE weakness , suspect myelopathy, mri cervical thoracic and lumbar ordered

- called radiology to expedite, imaging still not in system to review





 keppra to 250 mg bid


SBP < 150


PT OT


consider Osmin Chao MD Apr 15, 2020 22:00

## 2020-04-15 NOTE — NUR
Verbal report received from PEYTON Pillai. Noted CXR done. pending result. MAIDA worked with pt 
today. Cont to turn q2h. 

-------------------------------------------------------------------------------

Addendum: 04/15/20 at 2008 by CARMENCITA BEDOYA RN RN

-------------------------------------------------------------------------------

Disregard the above entry. Does not apply to this pt.

## 2020-04-15 NOTE — NUR
*-*DISCHARGE PLANNING*-*



PATIENT HAS BEEN REFERRED TO



[facility]

 P: [phone]

 F: [phone]

~~~~~~~~~~~~~~~~~~~~~~PHYSICAL THERAPY NOTES INCLUDED~~~~~~~~~~~~~~~~~~~~~

-------------------------------------------------------------------------------

Addendum: 04/15/20 at 1149 by LELO OWENS LVN LVN

-------------------------------------------------------------------------------

MESSAGE LEFT FOR [facility]  REGARDING ARU AUTHORIZATION

## 2020-04-15 NOTE — CONSULTATION
History of Present Illness


General


Date patient seen:  Apr 15, 2020


Reason for Hospitalization:  Abnormal Labs





Present Illness


HPI


65 year old female presented with abnormal labs.  elevated glucose, altered, 

admitted for care and management. has been relatively immobile and noted to 

develop deep tissue injury on sacral region.  abnormal lf'ts.  surgery called 

to evaluate and assist with care. patient seen, chart reviewed, patient 

examined.  resting comfortable but minimal movement.  responsive but seemingly 

confused


Allergies:  


Coded Allergies:  


     No Known Allergies (Unverified , 4/15/15)





COVID-19 Screening


Contact w/high risk pt:  No


Recent Travel to affected area:  No


Experienced COVID-19 symptoms?:  No





Medication History


Scheduled


No Known Medications* (NKM - No Known Medications*), 0 ., (Reported)





Patient History


Limited by:  medical condition


History Provided By:  Medical Record, PMD


Healthcare decision maker





Resuscitation status


Full Code


Advanced Directive on File


No





Past Medical/Surgical History


Past Medical/Surgical History:  


(1) Diabetes mellitus out of control


(2) Abnormal thyroid blood test


(3) Electrolyte imbalance


(4) STEMI (ST elevation myocardial infarction)


(5) Atrial fibrillation with rapid ventricular response


(6) Prolonged Q-T interval on ECG


(7) DKA (diabetic ketoacidoses)


(8) Hypernatremia


(9) UTI (urinary tract infection)


(10) Subdural hematoma





Review of Systems


Review of Symptoms


General ROS: no weight loss or fever


Psychological ROS: no depression or mood changes, no memory loss


Ophthalmic ROS: no visual changes or eye irritation


ENT ROS: no nasal congestion, hearing loss, dizziness


Allergy and Immunology ROS: no allergic symptoms or urticaria


Hematological and Lymphatic ROS: no swollen glands, unusual bleeding or bruising


Endocrine ROS: no polyuria, polydipsia, weight changes, temperature intolerance


Respiratory ROS: no cough, shortness of breath, or wheezing


Cardiovascular ROS: no chest pain or dyspnea on exertion


Gastrointestinal ROS: denies abdominal pain, bright red blood in stool.


Musculoskeletal ROS: no myalgias or arthralgias


Neurological ROS: no TIA or stroke symptoms


Dermatological ROS: no new or changing skin lesions, rashes or pruritis


difficult to obtain given condition





Physical Exam


Physical Exam


General appearance:  alert, cooperative, no distress, appears stated age


Head:  Normocephalic, without obvious abnormality, atraumatic


Eyes:  conjunctivae/corneas clear. PERRL, EOM's intact. Fundi benign


Throat:  Lips, mucosa, and tongue normal. Teeth and gums normal


Neck:  supple, symmetrical, trachea midline, no adenopathy, thyroid: not 

enlarged, symmetric, no tenderness/mass/nodules, no carotid bruit and no JVD


Lungs:  clear to auscultation bilaterally


Heart:  regular rate and rhythm, S1, S2 normal, no murmur, click, rub or gallop


Abdomen:  soft, non-tender. Bowel sounds normal. No masses,  no organomegaly


Extremities:  extremities normal, atraumatic, no cyanosis or edema


Pulses:  2+ and symmetric


Skin:  Skin see below 


Neurologic:  Grossly normal





Last 24 Hour Vital Signs








  Date Time  Temp Pulse Resp B/P (MAP) Pulse Ox O2 Delivery O2 Flow Rate FiO2


 


4/15/20 04:00 97.4 87 19 136/68 (90) 97   


 


4/15/20 04:00  89      


 


4/15/20 00:00  96      


 


4/15/20 00:00 97.0 94 19 126/67 (86) 97   


 


4/14/20 23:01  92  118/70    


 


4/14/20 20:00 97.0 92 19 118/70 (86) 95   


 


4/14/20 20:00      Room Air  


 


4/14/20 20:00  94      


 


4/14/20 16:00  87      


 


4/14/20 15:00 98.1 91 18 119/66 (83) 95   


 


4/14/20 12:00  84      


 


4/14/20 09:57  95  117/67    

















Intake and Output  


 


 4/14/20 4/15/20





 19:00 07:00


 


Intake Total 1560 ml 220 ml


 


Output Total 2300 ml 500 ml


 


Balance -740 ml -280 ml


 


  


 


Intake Oral 1560 ml 220 ml


 


Output Urine Total 2300 ml 500 ml


 


# Bowel Movements 9 2











Laboratory Tests








Test


  4/15/20


05:55


 


White Blood Count


  6.0 K/UL


(4.8-10.8)


 


Red Blood Count


  3.66 M/UL


(4.20-5.40)  L


 


Hemoglobin


  11.7 G/DL


(12.0-16.0)  L


 


Hematocrit


  32.8 %


(37.0-47.0)  L


 


Mean Corpuscular Volume 90 FL (80-99)  


 


Mean Corpuscular Hemoglobin


  31.9 PG


(27.0-31.0)  H


 


Mean Corpuscular Hemoglobin


Concent 35.6 G/DL


(32.0-36.0)


 


Red Cell Distribution Width


  12.0 %


(11.6-14.8)


 


Platelet Count


  50 K/UL


(150-450)  L


 


Mean Platelet Volume


  8.3 FL


(6.5-10.1)


 


Neutrophils (%) (Auto)


  % (45.0-75.0)


 


 


Lymphocytes (%) (Auto)


  % (20.0-45.0)


 


 


Monocytes (%) (Auto)  % (1.0-10.0)  


 


Eosinophils (%) (Auto)  % (0.0-3.0)  


 


Basophils (%) (Auto)  % (0.0-2.0)  


 


Neutrophils % (Manual) Pending  


 


Lymphocytes % (Manual) Pending  


 


Platelet Estimate Pending  


 


Platelet Morphology Pending  


 


Sodium Level


  151 MMOL/L


(136-145)  H


 


Potassium Level


  3.9 MMOL/L


(3.5-5.1)


 


Chloride Level


  115 MMOL/L


()  H


 


Carbon Dioxide Level


  27 MMOL/L


(21-32)


 


Anion Gap


  9 mmol/L


(5-15)


 


Blood Urea Nitrogen


  9 mg/dL (7-18)


 


 


Creatinine


  0.5 MG/DL


(0.55-1.30)  L


 


Estimat Glomerular Filtration


Rate > 60 mL/min


(>60)


 


Glucose Level


  247 MG/DL


()  #H


 


Calcium Level


  7.9 MG/DL


(8.5-10.1)  L


 


Phosphorus Level


  2.2 MG/DL


(2.5-4.9)  L


 


Magnesium Level


  2.3 MG/DL


(1.8-2.4)


 


Total Bilirubin


  0.3 MG/DL


(0.2-1.0)


 


Aspartate Amino Transf


(AST/SGOT) 45 U/L (15-37)


H


 


Alanine Aminotransferase


(ALT/SGPT) 36 U/L (12-78)


 


 


Alkaline Phosphatase


  122 U/L


()  H


 


C-Reactive Protein,


Quantitative 12.0 mg/dL


(0.00-0.90)  H


 


Total Protein


  5.7 G/DL


(6.4-8.2)  L


 


Albumin


  1.6 G/DL


(3.4-5.0)  L


 


Globulin 4.1 g/dL  


 


Albumin/Globulin Ratio


  0.4 (1.0-2.7)


L











Microbiology








 Date/Time


Source Procedure


Growth Status


 


 


 4/14/20 18:50


Stool Clostridium difficile Toxin Assay - Final Complete








Height (Feet):  5


Height (Inches):  4.00


Weight (Pounds):  110


Medications





Current Medications








 Medications


  (Trade)  Dose


 Ordered  Sig/Meghan


 Route


 PRN Reason  Start Time


 Stop Time Status Last Admin


Dose Admin


 


 Acetaminophen


  (Tylenol)  650 mg  Q4H  PRN


 ORAL


 Temp >100.5  4/12/20 13:15


 5/11/20 13:14   


 


 


 Acetaminophen


  (Tylenol)  650 mg  Q4H  PRN


 ORAL


 For Pain  4/12/20 14:00


 5/11/20 13:59   


 


 


 Atorvastatin


 Calcium


  (Lipitor)  10 mg  BEDTIME


 ORAL


   4/14/20 21:00


 7/10/20 20:59  4/14/20 23:00


 


 


 Ceftriaxone


 Sodium 1 gm/


 Dextrose  50 ml @ 


 100 mls/hr  Q24H


 IVPB


   4/14/20 21:00


 4/21/20 20:59  4/14/20 21:00


 


 


 Dextrose  1,000 ml @ 


 100 mls/hr  Q10H


 IV


   4/12/20 13:15


 5/12/20 09:59  4/15/20 00:20


 


 


 Dextrose


  (Dextrose 50%)  25 ml  Q30M  PRN


 IV


 Hypoglycemia  4/12/20 13:15


 7/9/20 21:44   


 


 


 Dextrose


  (Dextrose 50%)  50 ml  Q30M  PRN


 IV


 Hypoglycemia  4/12/20 13:15


 7/9/20 21:44   


 


 


 Insulin Aspart


  (NovoLOG)    AC+HS


 SUBQ


   4/12/20 16:30


 7/10/20 09:29  4/15/20 06:27


 


 


 Insulin Aspart


  (NovoLOG)  10 units  NOVOTIAC


 SUBQ


   4/15/20 11:50


 7/11/20 11:49   


 


 


 Insulin Detemir


  (Levemir)  24 units  QHS


 SUBQ


   4/15/20 21:00


 7/11/20 20:59   


 


 


 Levetiracetam


  (Keppra)  250 mg  Q12HR


 ORAL


   4/14/20 21:00


 5/29/20 20:59  4/14/20 23:00


 


 


 Metoprolol


 Tartrate


  (Lopressor)  25 mg  Q12HR


 ORAL


   4/12/20 21:00


 7/10/20 20:59  4/14/20 23:01


 


 


 Pantoprazole


  (Protonix)  40 mg  BID


 ORAL


   4/15/20 09:00


 5/14/20 08:59   


 


 


 Potassium


 Phosphate 20 mm/


 Sodium Chloride  281.6667


 ml @ 


 46.944 m...  ONCE  ONCE


 IV


   4/15/20 09:30


 4/15/20 15:29   


 


 


 Potassium Chloride


  (K-Dur)  40 meq  BID


 ORAL


   4/15/20 09:00


 7/12/20 09:14   


 


 


 Tramadol HCl


  (Ultram)  25 mg  Q6H  PRN


 ORAL


 Severe Breakthru Pain (>7)  4/12/20 14:00


 4/18/20 13:59   


 


 


 Vancomycin HCl


  (Firvanq)  125 mg  FOUR TIMES A  DAY


 ORAL


   4/14/20 21:00


 4/24/20 20:59  4/14/20 23:00


 











Assessment/Plan


Problem List:  


(1) Decubitus skin ulcer


Assessment & Plan:  Patient noted to have rapid onset Sacral DTPI. Per Primary 

Nurse pt noted on prior shift to have non-blanching erythema as per report.  

All preventive measures were taken to reposition patient and off-load pressure.

  Despite repositioning and applying Moisture Barrier Paste patient noted to 

have further skin decline. 


Sacral DTPI is irregular shaped, Purple and indurated in colour over 

sacrococcygeal with surrounding non-blanching erythema along borders.(L)7.1cm x 

(W)6.5cm. 


Nutritional intake varies 30-45% daily. 


Non-Blanching erythema without fluctuance noted to R and L heels.


Both heels observed floated with pillows Off mattress.





Tx.Plan: 


Apply Moisture Barrier Paste to Sacrum. Cover with Optifoam drsg. Change every 

3 days and prn.


            


Apply Cavilon Skin Barrier to both heels. Cover each heel with Optifoam drsg. 

Change every 7 days and prn.


            


Reposition at least every 2hours or as tolerated.


            


Off-load heels with pillow.


            


APM/VIK Mattress overlay.


ICD Codes:  L89.90 - Pressure ulcer of unspecified site, unspecified stage


SNOMED:  837631211


(2) Deep tissue injury


ICD Codes:  T14.8XXA - Other injury of unspecified body region, initial 

encounter


SNOMED:  636410915


(3) Malnutrition


Assessment & Plan:  DAILY ESTIMATED NEEDS:


Needs based on DM 61.5kg  


25-30  kcals/kg 


9600-0796  total kcals


1.25-1.5  g protein/kg


77-92  g total protein 


25-30  mL/kg


1063-9823  total fluid mLs





NUTRITION DIAGNOSIS:


Altered nutrition related lab values r/t DKA, clinical status as evidenced


by A1C 10.4,  on adm, (+) acetone, now w/ critically elev Na(161*)


and low K(2.4*).





CURRENT DIET: CCHO LOW puree + NTL     





PO DIET RECOMMENDATIONS:


CCHO LOW diet/ texture per SLP  





ADDITIONAL RECOMMENDATIONS:


1) W/ variable po intake add Glucerna 1 tetra per day


    Add high pro/1 carb snacks in b/w meals   


2) On D5 for hydration, monitor BG  


3) Wound care: Add JONATHAN BID + Vit C 250mg daily (f/up w/ WC eval) 


4) Obtain a calibrated bed scale wts 


    EMR wt: 110#      vs        Bed scale wt: 135#


ICD Codes:  E46 - Unspecified protein-calorie malnutrition


SNOMED:  36258171


(4) Abnormal thyroid blood test


ICD Codes:  R79.89 - Other specified abnormal findings of blood chemistry


SNOMED:  249253520, 505913844949033


(5) Electrolyte imbalance


Assessment & Plan:  1.  No pulmonary embolus, somewhat limited at the bases due 

to motion.


2.  Bibasilar lung atelectasis/airspace disease.


3.  3 mm nodule along the right middle lobe fissure. 3.6 mm nodule left 


lower lobe along the fissure.  Fleischner Society Guidelines for low-risk 


patients, no follow-up is necessary.  For high-risk patients (smoking 


history or other known risk factors) an optional chest CT at 12 months 


could be performed.


4.  Mild bilateral perinephric stranding.  May be senescent, correlate 


for infection.


5.  Distended gallbladder with hyperdensity may be sludge or stones.


6.  Prominent left lobe of the liver is slightly nodular in appearance.





US ordered


ICD Codes:  E87.8 - Other disorders of electrolyte and fluid balance, not 

elsewhere classified


SNOMED:  091010037











Tomas Adair Apr 15, 2020 09:41

## 2020-04-15 NOTE — NUR
Vital signs for 2000 are an error. Correct Vital signs are recored @ 2001 instead. See Vital 
Sign notes.

## 2020-04-15 NOTE — NEPHROLOGY PROGRESS NOTE
Assessment/Plan


Problem List:  


(1) Hypernatremia


Assessment:  Improving





(2) UTI (urinary tract infection)


(3) Subdural hematoma


(4) DKA (diabetic ketoacidoses)


(5) Electrolyte imbalance


Assessment





Hypernatremia most likely due to free water deficit


Hypokalemia, hypophosphatemia


Hyperglycemia and DKA


Evidence of UTI


Right-sided weakness with 3 mm subdural hematoma found in CT scan


Plan





Change Protonix to p.o.


Stop Neutra-Phos due to diarrhea


Hypotonic IV solution, IV was changed to D5W


Potassium and phosphorus and magnesium supplement as needed


Diet started as the patient is more alert


Low dose of Lopressor


Monitor renal parameters and electrolytes


Neuro eval noted


Keep the blood pressure and blood sugar in check


Urine studies


Per orders





Subjective


ROS Limited/Unobtainable:  No


Constitutional:  Reports: malaise, weakness





Objective


Objective





Last 24 Hour Vital Signs








  Date Time  Temp Pulse Resp B/P (MAP) Pulse Ox O2 Delivery O2 Flow Rate FiO2


 


4/15/20 04:00  89      


 


4/15/20 00:00  96      


 


4/14/20 23:01  92  118/70    


 


4/14/20 20:00      Room Air  


 


4/14/20 20:00  94      


 


4/14/20 16:00  87      


 


4/14/20 15:00 98.1 91 18 119/66 (83) 95   


 


4/14/20 12:00  84      


 


4/14/20 09:57  95  117/67    


 


4/14/20 09:05      Room Air  


 


4/14/20 09:00      Room Air  

















Intake and Output  


 


 4/14/20 4/15/20





 19:00 07:00


 


Intake Total 1560 ml 


 


Output Total 2300 ml 


 


Balance -740 ml 


 


  


 


Intake Oral 1560 ml 


 


Output Urine Total 2300 ml 


 


# Bowel Movements 9 2











Current Medications








 Medications


  (Trade)  Dose


 Ordered  Sig/Megahn


 Route


 PRN Reason  Start Time


 Stop Time Status Last Admin


Dose Admin


 


 Acetaminophen


  (Tylenol)  650 mg  Q4H  PRN


 ORAL


 Temp >100.5  4/12/20 13:15


 5/11/20 13:14   


 


 


 Acetaminophen


  (Tylenol)  650 mg  Q4H  PRN


 ORAL


 For Pain  4/12/20 14:00


 5/11/20 13:59   


 


 


 Atorvastatin


 Calcium


  (Lipitor)  10 mg  BEDTIME


 ORAL


   4/14/20 21:00


 7/10/20 20:59  4/14/20 23:00


 


 


 Ceftriaxone


 Sodium 1 gm/


 Dextrose  50 ml @ 


 100 mls/hr  Q24H


 IVPB


   4/14/20 21:00


 4/21/20 20:59  4/14/20 21:00


 


 


 Dextrose  1,000 ml @ 


 100 mls/hr  Q10H


 IV


   4/12/20 13:15


 5/12/20 09:59  4/15/20 00:20


 


 


 Dextrose


  (Dextrose 50%)  25 ml  Q30M  PRN


 IV


 Hypoglycemia  4/12/20 13:15


 7/9/20 21:44   


 


 


 Dextrose


  (Dextrose 50%)  50 ml  Q30M  PRN


 IV


 Hypoglycemia  4/12/20 13:15


 7/9/20 21:44   


 


 


 Insulin Aspart


  (NovoLOG)    AC+HS


 SUBQ


   4/12/20 16:30


 7/10/20 09:29  4/15/20 06:27


 


 


 Insulin Aspart


  (NovoLOG)  8 units  NOVOTIAC


 SUBQ


   4/13/20 12:00


 7/11/20 11:49  4/15/20 06:36


 


 


 Insulin Detemir


  (Levemir)  16 units  QHS


 SUBQ


   4/13/20 21:00


 7/11/20 20:59  4/14/20 23:45


 


 


 Levetiracetam


  (Keppra)  250 mg  Q12HR


 ORAL


   4/14/20 21:00


 5/29/20 20:59  4/14/20 23:00


 


 


 Metoprolol


 Tartrate


  (Lopressor)  25 mg  Q12HR


 ORAL


   4/12/20 21:00


 7/10/20 20:59  4/14/20 23:01


 


 


 Pantoprazole


  (Protonix)  40 mg  DAILY


 ORAL


   4/14/20 09:00


 5/14/20 08:59  4/14/20 09:57


 


 


 Potassium Chloride


  (K-Dur)  40 meq  TID


 ORAL


   4/13/20 09:15


 7/12/20 09:14  4/14/20 18:06


 


 


 Tramadol HCl


  (Ultram)  25 mg  Q6H  PRN


 ORAL


 Severe Breakthru Pain (>7)  4/12/20 14:00


 4/18/20 13:59   


 


 


 Vancomycin HCl


  (Firvanq)  125 mg  FOUR TIMES A  DAY


 ORAL


   4/14/20 21:00


 4/24/20 20:59  4/14/20 23:00


 





Laboratory Tests


4/15/20 05:55: 


White Blood Count 6.0, Red Blood Count 3.66L, Hemoglobin 11.7L, Hematocrit 32.8L

, Mean Corpuscular Volume 90, Mean Corpuscular Hemoglobin 31.9H, Mean 

Corpuscular Hemoglobin Concent 35.6, Red Cell Distribution Width 12.0, Platelet 

Count 50L, Mean Platelet Volume 8.3, Neutrophils (%) (Auto) , Lymphocytes (%) (

Auto) , Monocytes (%) (Auto) , Eosinophils (%) (Auto) , Basophils (%) (Auto) , 

Neutrophils % (Manual) [Pending], Lymphocytes % (Manual) [Pending], Platelet 

Estimate [Pending], Platelet Morphology [Pending], Sodium Level 151H, Potassium 

Level 3.9, Chloride Level 115H, Carbon Dioxide Level 27, Anion Gap 9, Blood 

Urea Nitrogen 9, Creatinine 0.5L, Estimat Glomerular Filtration Rate > 60, 

Glucose Level 247#H, Calcium Level 7.9L, Phosphorus Level 2.2L, Magnesium Level 

2.3, Total Bilirubin 0.3, Aspartate Amino Transf (AST/SGOT) 45H, Alanine 

Aminotransferase (ALT/SGPT) 36, Alkaline Phosphatase 122H, C-Reactive Protein, 

Quantitative 12.0H, Total Protein 5.7L, Albumin 1.6L, Globulin 4.1, Albumin/

Globulin Ratio 0.4L


Height (Feet):  5


Height (Inches):  4.00


Weight (Pounds):  110


General Appearance:  no apparent distress, lethargic


Cardiovascular:  tachycardia


Respiratory/Chest:  decreased breath sounds


Abdomen:  soft


Objective


No change











Jonathan Jacobson MD Apr 15, 2020 08:39

## 2020-04-15 NOTE — NUR
****DISCHARGE PLANNING

[facility] 

 

[facility] [place]

[address] [address]



[facility] PROVIDED AUTHORIZATION NUMBER ~ [id number]

FOR TRANSPORTATION USE LOGISTIC CARE

******WAITING FOR DISCHARGE ORDER

## 2020-04-15 NOTE — GENERAL PROGRESS NOTE
Assessment/Plan


Assessment/Plan:


65-year-old female with PMH of medical noncompliance, DM type II who presents 

with b/l weakness, N/V, GLF, on admission pt found to be in DKA with 's.





#DKA - improved, 2/2 


#E. coli UTI


#E. coli bacteremia


#Uncontrolled DM, Hgb A1C 10.4


-continue in-patient medical care


-s/p DKA protocol with improved AG, AG 21-> 14


-CT abd reviewed


-4/10: UCx e. coli


-4/10: BCx w/E. coli 2/2, repeat 4/12 BCx NGTD


-cont. accuchofes, ISS qAC/HS


-ID, Dr. Looney: CTX, day #4 of abx, PO vanco for c diff


-Endo following: increase Levemir to 24 units qhs, increase Novolog to 10 units 

ac tid 





#Right sided weakness


#Generalized Weakness


#SDH 3mm no midline shift


-weakness may be multifactorial, metabolic


-pt AOx3, pt noted fall 3 days ago


-found on CT head, repeat CT head stable


-4/14: CT head obtained given worsening weakness, stable compared to previous


-d/w Neuro, no need for transfer at this time, will decrease keppra


-cont. fall precautions


-PT treat and eval


-d/w Neurology, Dr. Cheney, weakness does not appear to be neurologic at this 

time


-CM for d/c planning, plan for CRI on d/c





#c. diff colitis


-pt w/multiple episodes of loose stool


-c.diff positive


-ID following: PO vanc





#Elevated d-dimer


#Tachycardia - resolved


-likely multifactorial given infection/DKA, PE has been ruled out


-EKG w/NSR


-CTA thorax negative for PE


-tachycardia likely 2/2 dehydration, encourage PO intake





#Hypernatremia - downtrending


#Hypokalemia


#Hypophosphatemia


-Hypernatremia likely 2/2 free water deficit


-replace electrolytes, cont. to monitor and replace PRN


-d/w nephro, cont. bruner for accurate I's and O's


-Nephro consulted, IVF and electrolytes per nephro





DVT - SCDs for now given SDH





I spent 29 minutes on this patient's case, d/w pt POC and updated RN.


Additional 45 mins non-face to face time was dedicated to coordination of care 

which included communication with Indira London regarding electrolytes, Dr. Cheney regarding CT findings, ID regarding diarrhea, and CM for dispo 

planning.





Subjective


Allergies:  


Coded Allergies:  


     No Known Allergies (Unverified , 4/15/15)


Subjective


F/u DKA, UTI, hypernatremia, SDH.


Na remains elevated, downtrending.


Pt more alert today, appears to understand english and denies any pain, notes 

weakness is stable.





Objective





Last 24 Hour Vital Signs








  Date Time  Temp Pulse Resp B/P (MAP) Pulse Ox O2 Delivery O2 Flow Rate FiO2


 


4/15/20 04:00  89      


 


4/15/20 00:00  96      


 


4/14/20 23:01  92  118/70    


 


4/14/20 20:00      Room Air  


 


4/14/20 20:00  94      


 


4/14/20 16:00  87      


 


4/14/20 15:00 98.1 91 18 119/66 (83) 95   


 


4/14/20 12:00  84      


 


4/14/20 09:57  95  117/67    


 


4/14/20 09:05      Room Air  


 


4/14/20 09:00      Room Air  

















Intake and Output  


 


 4/14/20 4/15/20





 19:00 07:00


 


Intake Total 1560 ml 


 


Output Total 2300 ml 


 


Balance -740 ml 


 


  


 


Intake Oral 1560 ml 


 


Output Urine Total 2300 ml 


 


# Bowel Movements 9 2








Laboratory Tests


4/15/20 05:55: 


White Blood Count 6.0, Red Blood Count 3.66L, Hemoglobin 11.7L, Hematocrit 32.8L

, Mean Corpuscular Volume 90, Mean Corpuscular Hemoglobin 31.9H, Mean 

Corpuscular Hemoglobin Concent 35.6, Red Cell Distribution Width 12.0, Platelet 

Count 50L, Mean Platelet Volume 8.3, Neutrophils (%) (Auto) , Lymphocytes (%) (

Auto) , Monocytes (%) (Auto) , Eosinophils (%) (Auto) , Basophils (%) (Auto) , 

Neutrophils % (Manual) [Pending], Lymphocytes % (Manual) [Pending], Platelet 

Estimate [Pending], Platelet Morphology [Pending], Sodium Level 151H, Potassium 

Level 3.9, Chloride Level 115H, Carbon Dioxide Level 27, Anion Gap 9, Blood 

Urea Nitrogen 9, Creatinine 0.5L, Estimat Glomerular Filtration Rate > 60, 

Glucose Level 247#H, Calcium Level 7.9L, Phosphorus Level 2.2L, Magnesium Level 

2.3, Total Bilirubin 0.3, Aspartate Amino Transf (AST/SGOT) 45H, Alanine 

Aminotransferase (ALT/SGPT) 36, Alkaline Phosphatase 122H, C-Reactive Protein, 

Quantitative 12.0H, Total Protein 5.7L, Albumin 1.6L, Globulin 4.1, Albumin/

Globulin Ratio 0.4L


Height (Feet):  5


Height (Inches):  4.00


Weight (Pounds):  110


Objective


General: NAD, laying in bed comfortably, AAO x3


HEENT: NCAT, EOMi, mucous membranes moist


CV: RRR, no murmurs, rubs, or gallops


Pulm: CTAB, No wheezes, rhonchi, or rales, no accessory muscle usage


GI: Soft, nontender, nondistended, bowel sounds present


: Bruner in place, draining yellow urine


Neuro: CN II-XII grossly intact, sensation intact bilaterally, MS 3/5 in UE, 0/

5 LE B/L


Ext: no LE edema











Anitha Ochoa M.D. Apr 15, 2020 08:43

## 2020-04-15 NOTE — DIAGNOSTIC IMAGING REPORT
Indication: Abnormal liver function.

 

Technique: Multiplanar grayscale and duplex Doppler imaging of the abdomen

 

Comparison: Correlate made to CT of the abdomen and pelvis 4/10/2020

 

Findings: Imaged portions of the pancreatic head are grossly unremarkable in

appearance. The body and tail are not well seen.

 

Hepatic contour appears smooth. There is diffusely increased hepatic echogenicity. No

sonographically appreciable focal liver lesion. Imaged hepatic veins are patent. Main

portal vein is patent with normal direction of flow. Minimal layering sludge is noted

within the gallbladder. No gallbladder wall thickening or pericholecystic fluid.

Sonographic Garcia sign reported as negative. No intrahepatic or extrahepatic biliary

ductal dilatation. Common bile duct measures 3-4 mm diameter.

 

Images demonstrate normal echogenicity. No sonographically appreciable renal stones

identified. There is mild fullness of the renal collecting systems. A Vincent catheter

is noted within the bladder however there is distention of the bladder despite the

Vincent catheter with a bladder volume of 118 mL correlate to ensure appropriate Vincent

catheter functioning.

 

Spleen is normal in size and unremarkable in appearance.

 

Trace pleural effusions are suggested.

 

IMPRESSION:

*  Increased hepatic echogenicity most commonly related to hepatic steatosis.

*  Small amount of layering sludge in the gallbladder. No sonographic evidence to

suggest acute cholecystitis. Sonographic Garcia sign reported as negative.

*  No biliary ductal dilatation.

*  Bladder mildly distended despite the presence of an indwelling Vincent catheter

(bladder volume of the 118 mL). Correlate to ensure proper catheter functioning. This

finding was communicated to the patient's treating nurse Eli by the portion

technologist, as documented in the preliminary report.

*  Mild fullness of the bilateral renal collecting systems.

 

Impression:

## 2020-04-15 NOTE — NUR
****INSURANCE CONTACT INFORMATION

 CALLED [facility] AND OBTAINED CONTACT INFORMATION FOR THIS PATIENT



[facility]

NCM: ROBERT WAITE

T: [phone] (LEAVE A MESSAGE)

F: [phone]



EMAIL ~ [email or web address]

## 2020-04-15 NOTE — NUR
CASE MANAGEMENT:REVIEW



4/15/20

SI: SEPSIS D/T E COLI BACTEREMIA AND UTI. DKA

HYPERNATREMIA. HYPOKALEMIA. SUBDURAL HEMATOMA

97.4   87  19  136/68   97% ON RA

H/H-11.7/32.8   PLT-50  NA+151   GLUCOSE+247  CA-7.9  PHOS-2.2



IS: IV ROCEPHIN Q24

IVF@100/HR

LEVEMIR SQ QHS

VIT C PO BID

INSULIN SQ TID

PROTONIX PO BID

K-DUR PO BID

KEPPRA PO Q12

VANCOMYCIN PO QID

LOPRESSOR PO Q12

**: TELEMETRY STATUS

DCP: FROM HOME



PLAN:

NEUROLOG[facility] AND [facility]ENDING [facility]

PATIENT WAS INDEPENDENT PRIOR TO ADMISSION

JUST RECEIVED CONTACT INFORMATION FOR [facility]  John JONES

## 2020-04-16 VITALS — SYSTOLIC BLOOD PRESSURE: 107 MMHG | DIASTOLIC BLOOD PRESSURE: 63 MMHG

## 2020-04-16 VITALS — DIASTOLIC BLOOD PRESSURE: 59 MMHG | SYSTOLIC BLOOD PRESSURE: 112 MMHG

## 2020-04-16 VITALS — SYSTOLIC BLOOD PRESSURE: 116 MMHG | DIASTOLIC BLOOD PRESSURE: 69 MMHG

## 2020-04-16 VITALS — SYSTOLIC BLOOD PRESSURE: 128 MMHG | DIASTOLIC BLOOD PRESSURE: 68 MMHG

## 2020-04-16 VITALS — DIASTOLIC BLOOD PRESSURE: 93 MMHG | SYSTOLIC BLOOD PRESSURE: 121 MMHG

## 2020-04-16 VITALS — DIASTOLIC BLOOD PRESSURE: 70 MMHG | SYSTOLIC BLOOD PRESSURE: 111 MMHG

## 2020-04-16 LAB
ADD MANUAL DIFF: YES
ALBUMIN SERPL-MCNC: 1.5 G/DL (ref 3.4–5)
ALBUMIN/GLOB SERPL: 0.3 {RATIO} (ref 1–2.7)
ALP SERPL-CCNC: 130 U/L (ref 46–116)
ALT SERPL-CCNC: 30 U/L (ref 12–78)
ANION GAP SERPL CALC-SCNC: 8 MMOL/L (ref 5–15)
AST SERPL-CCNC: 48 U/L (ref 15–37)
BILIRUB SERPL-MCNC: 0.5 MG/DL (ref 0.2–1)
BUN SERPL-MCNC: 10 MG/DL (ref 7–18)
CALCIUM SERPL-MCNC: 8.4 MG/DL (ref 8.5–10.1)
CHLORIDE SERPL-SCNC: 108 MMOL/L (ref 98–107)
CO2 SERPL-SCNC: 27 MMOL/L (ref 21–32)
CREAT SERPL-MCNC: 0.5 MG/DL (ref 0.55–1.3)
ERYTHROCYTE [DISTWIDTH] IN BLOOD BY AUTOMATED COUNT: 11.4 % (ref 11.6–14.8)
GLOBULIN SER-MCNC: 4.6 G/DL
HCT VFR BLD CALC: 32.6 % (ref 37–47)
HGB BLD-MCNC: 11.3 G/DL (ref 12–16)
MCV RBC AUTO: 90 FL (ref 80–99)
PHOSPHATE SERPL-MCNC: 2.7 MG/DL (ref 2.5–4.9)
PLATELET # BLD: 66 K/UL (ref 150–450)
POTASSIUM SERPL-SCNC: 3.8 MMOL/L (ref 3.5–5.1)
RBC # BLD AUTO: 3.63 M/UL (ref 4.2–5.4)
SODIUM SERPL-SCNC: 143 MMOL/L (ref 136–145)
WBC # BLD AUTO: 5.8 K/UL (ref 4.8–10.8)

## 2020-04-16 RX ADMIN — Medication SCH MG: at 10:38

## 2020-04-16 RX ADMIN — VANCOMYCIN HYDROCHLORIDE SCH MG: 500 INJECTION, POWDER, LYOPHILIZED, FOR SOLUTION INTRAVENOUS at 10:37

## 2020-04-16 RX ADMIN — INSULIN ASPART SCH UNITS: 100 INJECTION, SOLUTION INTRAVENOUS; SUBCUTANEOUS at 20:56

## 2020-04-16 RX ADMIN — INSULIN ASPART SCH UNITS: 100 INJECTION, SOLUTION INTRAVENOUS; SUBCUTANEOUS at 12:28

## 2020-04-16 RX ADMIN — VANCOMYCIN HYDROCHLORIDE SCH MG: 500 INJECTION, POWDER, LYOPHILIZED, FOR SOLUTION INTRAVENOUS at 12:24

## 2020-04-16 RX ADMIN — Medication SCH MG: at 17:45

## 2020-04-16 RX ADMIN — INSULIN ASPART SCH UNITS: 100 INJECTION, SOLUTION INTRAVENOUS; SUBCUTANEOUS at 17:11

## 2020-04-16 RX ADMIN — VANCOMYCIN HYDROCHLORIDE SCH MG: 500 INJECTION, POWDER, LYOPHILIZED, FOR SOLUTION INTRAVENOUS at 21:22

## 2020-04-16 RX ADMIN — INSULIN ASPART SCH UNITS: 100 INJECTION, SOLUTION INTRAVENOUS; SUBCUTANEOUS at 06:47

## 2020-04-16 RX ADMIN — INSULIN DETEMIR SCH UNITS: 100 INJECTION, SOLUTION SUBCUTANEOUS at 20:57

## 2020-04-16 RX ADMIN — INSULIN ASPART SCH UNITS: 100 INJECTION, SOLUTION INTRAVENOUS; SUBCUTANEOUS at 01:09

## 2020-04-16 RX ADMIN — INSULIN ASPART SCH UNITS: 100 INJECTION, SOLUTION INTRAVENOUS; SUBCUTANEOUS at 12:29

## 2020-04-16 RX ADMIN — INSULIN ASPART SCH UNITS: 100 INJECTION, SOLUTION INTRAVENOUS; SUBCUTANEOUS at 06:48

## 2020-04-16 RX ADMIN — VANCOMYCIN HYDROCHLORIDE SCH MG: 500 INJECTION, POWDER, LYOPHILIZED, FOR SOLUTION INTRAVENOUS at 17:45

## 2020-04-16 RX ADMIN — SODIUM CHLORIDE SCH MLS/HR: 0.9 INJECTION INTRAVENOUS at 21:22

## 2020-04-16 NOTE — NUR
NURSE NOTES:

Received report from DEREK Fernandez. Patient in bed resting, no active s/s cardiac, respiratory 
distress noticed at this time. Patient AOx3, SR with HR 95. IV on left upper arm 22G, 
asymptomatic, patent, intact. Patient on room air, denies pain at this time, patient easily 
abusable when asleep, responding to verbal stimuli. Bed in lowest position, side rails upx2, 
call light within reach, bed alarm on. Will continue to monitor.

## 2020-04-16 NOTE — NUR
****DISCHARGE PLANNING

PATIENT [facility] BE[facility] ACCEPT: 

 

[facility] [place]

[address] [address]



[facility] PROVIDED AUTHORIZATION NUMBER ~ [id number]

FOR TRANSPORTATION USE LOGISTIC CARE



[facility] IS NOW QUESTION[facility] IF PATIENT'S INSURANCE POLICY HAS TERMED

SPOKE WITH NUBIA AT [facility] T: [phone]. SHE IS GOING TO CALL OUR ADMITTING DEPARTMENT 
REGARDING POLICY BENEFITS

## 2020-04-16 NOTE — NUR
CASE MANAGEMENT:REVIEW



4/16/20

SI: SEPSIS D/T E COLI BACTEREMIA AND UTI. DKA

HYPERNATREMIA. HYPOKALEMIA. SUBDURAL HEMATOMA

98.4   99  18  107/63  100% ON RA

H/H-11.3/32.6   PLT-66   GLUCOSE+299   



IS: IV ROCEPHIN Q24

IVF@100/HR

LEVEMIR SQ QHS

VIT C PO BID

INSULIN SQ TID

PROTONIX PO BID

K-DUR PO BID

KEPPRA PO Q12

VANCOMYCIN PO QID

LOPRESSOR PO Q12

**: TELEMETRY STATUS

DCP: FROM HOME



PLAN:

DOWNGRADE TO MED/SURG

[facility]CHAR[facility] PLAN [facility] [place]....WAITING FOR DISCHARGE 
ORDER

## 2020-04-16 NOTE — DIAGNOSTIC IMAGING REPORT
Indication: Bilateral upper extremity weakness 

 

Technique: Sagittal T1 FLAIR PROPELLER, sagittal T2 PROPELLOR, sagittal STIR, axial

T2 PROPELLER, axial 3D COSMIC ASPIR images were obtained through the cervical spine

 

Comparison: none

 

Findings: Bony alignment is normal. Vertebral body heights are preserved. Vertebral

marrow signal is normal. Intrinsic cord signal is normal.

 

At C3-4, the disc space is preserved. No significant disc bulge or protrusion. There

is borderline narrowing of the spinal canal due to short pedicles, but no significant

impingement upon the cord. There is mild right and moderate left neural foraminal

stenosis primarily due to facet hypertrophy.

 

At C4-5, the disc space is preserved. No significant disc bulge or protrusion or

spinal stenosis. There is mild narrowing of bilateral neural foramina and facet

hypertrophy.

 

At C5-6, there is moderate degenerative disc narrowing. There is generalized

circumferential bulge. This results in moderate narrowing of the spinal canal, AP

dimension 8 mm, and slight impingement upon the spinal cord. There is also a slight

left lateral posterior osteophyte/disc bulge complex which may impinge upon the left

neural foramen. There is mild to moderate right, moderate to severe left neural

foraminal narrowing at this level.

 

At C6-7 the disc space is preserved. There is generalized circumferential annular

disc bulge as well and is more focal broad-based central posterior disc protrusion.

This results in borderline narrowing of the spinal canal. No evidence of significant

cord impingement. There is mild bilateral neural foraminal stenosis at this level.

 

At the remaining disc levels, no significant disc bulge or protrusion, spinal

stenosis, or neural foraminal narrowing.

 

The included extraspinal soft tissues are unremarkable.

 

Impression: Degenerative changes as detailed on a level by level basis above, most

striking at C5-6 where there is moderate spinal stenosis and bilateral neural

foraminal narrowing

 

No acute abnormality

## 2020-04-16 NOTE — SURGERY PROGRESS NOTE
Surgery Progress Note


Subjective


Additional Comments


MRI noted


neuro input appreciated


afebrile, HD stable





Objective





Last 24 Hour Vital Signs








  Date Time  Temp Pulse Resp B/P (MAP) Pulse Ox O2 Delivery O2 Flow Rate FiO2


 


4/16/20 10:44  99  107/63    


 


4/16/20 09:00      Room Air  


 


4/16/20 08:00  99      


 


4/16/20 08:00 98.4 99 18 107/63 (78) 100   


 


4/16/20 04:00  89      


 


4/16/20 04:00 98.0 95 18 116/69 (85) 90   


 


4/16/20 00:00  98      


 


4/16/20 00:00 97.7 97 18 128/68 (88) 97   


 


4/15/20 21:00      Room Air  


 


4/15/20 21:00  93  124/66    


 


4/15/20 20:01 97.0 93 18 124/66 (85) 96   


 


4/15/20 20:00 97.7 91 18 147/73 (97) 98   


 


4/15/20 16:00 98.6 101 20 121/61 (81) 96   


 


4/15/20 16:00      Room Air  


 


4/15/20 16:00  98      








I&O











Intake and Output  


 


 4/15/20 4/16/20





 19:00 07:00


 


Intake Total 360 ml 


 


Output Total 1500 ml 1200 ml


 


Balance -1140 ml -1200 ml


 


  


 


Intake Oral 360 ml 


 


Output Urine Total 1500 ml 1200 ml


 


# Voids 1 


 


# Bowel Movements 6 2








Dressing:  dry


Wound:  clean


Cardiovascular:  RSR


Respiratory:  clear


Abdomen:  soft, non-tender, present bowel sounds


Extremities:  no edema, no tenderness, no cyanosis





Laboratory Tests








Test


  4/16/20


06:00


 


White Blood Count


  5.8 K/UL


(4.8-10.8)


 


Red Blood Count


  3.63 M/UL


(4.20-5.40)  L


 


Hemoglobin


  11.3 G/DL


(12.0-16.0)  L


 


Hematocrit


  32.6 %


(37.0-47.0)  L


 


Mean Corpuscular Volume 90 FL (80-99)  


 


Mean Corpuscular Hemoglobin


  31.0 PG


(27.0-31.0)


 


Mean Corpuscular Hemoglobin


Concent 34.6 G/DL


(32.0-36.0)


 


Red Cell Distribution Width


  11.4 %


(11.6-14.8)  L


 


Platelet Count


  66 K/UL


(150-450)  L


 


Mean Platelet Volume


  9.1 FL


(6.5-10.1)


 


Neutrophils (%) (Auto)


  % (45.0-75.0)


 


 


Lymphocytes (%) (Auto)


  % (20.0-45.0)


 


 


Monocytes (%) (Auto)  % (1.0-10.0)  


 


Eosinophils (%) (Auto)  % (0.0-3.0)  


 


Basophils (%) (Auto)  % (0.0-2.0)  


 


Differential Total Cells


Counted 100  


 


 


Neutrophils % (Manual) 78 % (45-75)  H


 


Lymphocytes % (Manual) 19 % (20-45)  L


 


Monocytes % (Manual) 3 % (1-10)  


 


Eosinophils % (Manual) 0 % (0-3)  


 


Basophils % (Manual) 0 % (0-2)  


 


Band Neutrophils 0 % (0-8)  


 


Platelet Estimate Decreased  L


 


Platelet Morphology Normal  


 


Hypochromasia 1+  


 


Anisocytosis 1+  


 


Spherocytes 1+  


 


Sodium Level


  143 MMOL/L


(136-145)


 


Potassium Level


  3.8 MMOL/L


(3.5-5.1)


 


Chloride Level


  108 MMOL/L


()  H


 


Carbon Dioxide Level


  27 MMOL/L


(21-32)


 


Anion Gap


  8 mmol/L


(5-15)


 


Blood Urea Nitrogen


  10 mg/dL


(7-18)


 


Creatinine


  0.5 MG/DL


(0.55-1.30)  L


 


Estimat Glomerular Filtration


Rate > 60 mL/min


(>60)


 


Glucose Level


  299 MG/DL


()  H


 


Calcium Level


  8.4 MG/DL


(8.5-10.1)  L


 


Phosphorus Level


  2.7 MG/DL


(2.5-4.9)


 


Magnesium Level


  2.3 MG/DL


(1.8-2.4)


 


Total Bilirubin


  0.5 MG/DL


(0.2-1.0)


 


Aspartate Amino Transf


(AST/SGOT) 48 U/L (15-37)


H


 


Alanine Aminotransferase


(ALT/SGPT) 30 U/L (12-78)


 


 


Alkaline Phosphatase


  130 U/L


()  H


 


Total Protein


  6.1 G/DL


(6.4-8.2)  L


 


Albumin


  1.5 G/DL


(3.4-5.0)  L


 


Globulin 4.6 g/dL  


 


Albumin/Globulin Ratio


  0.3 (1.0-2.7)


L











Plan


Problems:  


(1) Decubitus skin ulcer


Assessment & Plan:  Patient noted to have rapid onset Sacral DTPI. Per Primary 

Nurse pt noted on prior shift to have non-blanching erythema as per report.  

All preventive measures were taken to reposition patient and off-load pressure.

  Despite repositioning and applying Moisture Barrier Paste patient noted to 

have further skin decline. 


Sacral DTPI is irregular shaped, Purple and indurated in colour over 

sacrococcygeal with surrounding non-blanching erythema along borders.(L)7.1cm x 

(W)6.5cm. 


Nutritional intake varies 30-45% daily. 


Non-Blanching erythema without fluctuance noted to R and L heels.


Both heels observed floated with pillows Off mattress.





Tx.Plan: 


Apply Moisture Barrier Paste to Sacrum. Cover with Optifoam drsg. Change every 

3 days and prn.


            


Apply Cavilon Skin Barrier to both heels. Cover each heel with Optifoam drsg. 

Change every 7 days and prn.


            


Reposition at least every 2hours or as tolerated.


            


Off-load heels with pillow.


            


APM/VIK Mattress overlay.





MRI spine noted


neuro input appreciated 





(2) Deep tissue injury


(3) Malnutrition


Assessment & Plan:  DAILY ESTIMATED NEEDS:


Needs based on DM 61.5kg  


25-30  kcals/kg 


5915-2237  total kcals


1.25-1.5  g protein/kg


77-92  g total protein 


25-30  mL/kg


7393-6989  total fluid mLs





NUTRITION DIAGNOSIS:


Altered nutrition related lab values r/t DKA, clinical status as evidenced


by A1C 10.4,  on adm, (+) acetone, now w/ critically elev Na(161*)


and low K(2.4*).





CURRENT DIET: CCHO LOW puree + NTL     





PO DIET RECOMMENDATIONS:


CCHO LOW diet/ texture per SLP  





ADDITIONAL RECOMMENDATIONS:


1) W/ variable po intake add Glucerna 1 tetra per day


    Add high pro/1 carb snacks in b/w meals   


2) On D5 for hydration, monitor BG  


3) Wound care: Add JONATHAN BID + Vit C 250mg daily (f/up w/ WC eval) 


4) Obtain a calibrated bed scale wts 


    EMR wt: 110#      vs        Bed scale wt: 135# 





(4) Abnormal thyroid blood test


(5) Electrolyte imbalance


Assessment & Plan:  1.  No pulmonary embolus, somewhat limited at the bases due 

to motion.


2.  Bibasilar lung atelectasis/airspace disease.


3.  3 mm nodule along the right middle lobe fissure. 3.6 mm nodule left 


lower lobe along the fissure.  Fleischner Society Guidelines for low-risk 


patients, no follow-up is necessary.  For high-risk patients (smoking 


history or other known risk factors) an optional chest CT at 12 months 


could be performed.


4.  Mild bilateral perinephric stranding.  May be senescent, correlate 


for infection.


5.  Distended gallbladder with hyperdensity may be sludge or stones.


6.  Prominent left lobe of the liver is slightly nodular in appearance.





US ordered 














Tomas Adair Apr 16, 2020 13:19

## 2020-04-16 NOTE — NUR
NURSE NOTES:

 Patient was able to verbalized the need and void in the bedpan. Staff assistance needed for 
positioning and placing bedpan. Patient awake, alert, and oriented to let her needs known. 
Will continue to monitor.

## 2020-04-16 NOTE — INFECTIOUS DISEASES PROG NOTE
Assessment/Plan


Assessment/Plan





ASSESSMENT AND PLAN:





1. e.coli uti/pyelonephritis with bacteremia, sepsis, fevers, gram neg sepsis 


    c.diff. +, diarrhea 





   - ceftriaxone - day # 6 abx, plan on 10 days treatment


   - po vancomycin - day # 3, plan on 14 days treatment (this would include 7 

days treatment post use of concomitant antibiotics) 


   - surveillance blood cultures negative 


   - monitor labs and temperatures 


   - sepsis improved 





2. DKA.


3. Hypernatremia.


4. Diabetes type 2.


5. Ground-level fall.


6. Nausea and vomiting.


7. Diarrhea.


8. We will check stool studies.


9. Noncompliance.


10. Right-sided weakness.


11. Subdural hematoma with midline shift.


12. No known drug allergies.


13. Social history negative.


14. Family history noncontributory.


15. MAR was noted.


16. Case discussed with RN.


17. Continue treatment per primary consultants.


18. Orders were noted and entered.





Subjective


Constitutional:  Denies: fever


HEENT:  Denies: congestion


Respiratory:  Denies: shortness of breath


Cardiovascular:  Denies: chest pain


Gastrointestinal/Abdominal:  Reports: diarrhea; Denies: nausea, vomiting


Genitourinary:  Reports: other - no bruner


Neurologic:  Denies: headache


Psychiatric:  Denies: depression


Skin:  Denies: rash


Hematologic:  Denies: bleeding


Musculoskeletal:  Denies: pain


Allergies:  


Coded Allergies:  


     No Known Allergies (Unverified , 4/15/15)





Objective


Vital Signs





Last 24 Hour Vital Signs








  Date Time  Temp Pulse Resp B/P (MAP) Pulse Ox O2 Delivery O2 Flow Rate FiO2


 


4/16/20 16:00  95      


 


4/16/20 16:00 98.0 91 22 112/59 (76) 98   


 


4/16/20 12:00 98.1 91 22 121/93 (102) 98   


 


4/16/20 12:00  95      


 


4/16/20 10:44  99  107/63    


 


4/16/20 09:00      Room Air  


 


4/16/20 08:00  99      


 


4/16/20 08:00 98.4 99 18 107/63 (78) 100   


 


4/16/20 04:00  89      


 


4/16/20 04:00 98.0 95 18 116/69 (85) 90   


 


4/16/20 00:00  98      


 


4/16/20 00:00 97.7 97 18 128/68 (88) 97   


 


4/15/20 21:00      Room Air  


 


4/15/20 21:00  93  124/66    


 


4/15/20 20:01 97.0 93 18 124/66 (85) 96   


 


4/15/20 20:00 97.7 91 18 147/73 (97) 98   








Height (Feet):  5


Height (Inches):  4.00


Weight (Pounds):  135


General Appearance:  no acute distress


HEENT:  normocephalic, atraumatic, anicteric, mucous membranes moist


Respiratory/Chest:  crackles/rales, rhonchi - bilaterally


Cardiovascular:  normal rate, regular rhythm, no gallop/murmur, no JVD


Abdomen:  normal bowel sounds, soft, non tender, no organomegaly, non distended


Genitourinary:  other - no bruner


Extremities:  no cyanosis


Skin:  no rash


Neurologic/Psychiatric:  CNs II-XII grossly normal, alert, responsive


Lymphatic:  no neck adenopathy


Musculoskeletal:  no effusion


Objective





CT chest - 





IMPRESSION:     


1.  No pulmonary embolus, somewhat limited at the bases due to motion.


2.  Bibasilar lung atelectasis/airspace disease.


3.  3 mm nodule along the right middle lobe fissure. 3.6 mm nodule left 


lower lobe along the fissure.  Fleischner Society Guidelines for low-risk 


patients, no follow-up is necessary.  For high-risk patients (smoking 


history or other known risk factors) an optional chest CT at 12 months 


could be performed.


4.  Mild bilateral perinephric stranding.  May be senescent, correlate 


for infection.


5.  Distended gallbladder with hyperdensity may be sludge or stones.


6.  Prominent left lobe of the liver is slightly nodular in appearance.





 





CT abdomen and pelvis:





IMPRESSION:     


1.  Urinary bladder distention.


2.  Mild heterogeneity of the lower pole of the left kidney may be 


artifactual.  Infection is not excluded.  Consider correlation with 


urinalysis, as clinically indicated.  Mild bilateral renal pelviectasis 


and prominence of bilateral ureters is likely related to distention of 


the urinary bladder.





Microbiology








 Date/Time


Source Procedure


Growth Status


 


 


 4/12/20 15:15


Blood Blood Culture - Preliminary


NO GROWTH AFTER 72 HOURS Resulted


 


 4/14/20 18:50


Stool Clostridium difficile Toxin Assay - Final Complete


 


 4/10/20 20:40


Urine,Clean Catch Urine Culture - Final


Escherichia Coli Complete








Microbiology








 Date/Time


Source Procedure


Growth Status


 


 


 4/14/20 18:50


Stool Clostridium difficile Toxin Assay - Final Complete





c.diff. +





Laboratory Tests








Test


  4/16/20


06:00


 


White Blood Count


  5.8 K/UL


(4.8-10.8)


 


Red Blood Count


  3.63 M/UL


(4.20-5.40)  L


 


Hemoglobin


  11.3 G/DL


(12.0-16.0)  L


 


Hematocrit


  32.6 %


(37.0-47.0)  L


 


Mean Corpuscular Volume 90 FL (80-99)  


 


Mean Corpuscular Hemoglobin


  31.0 PG


(27.0-31.0)


 


Mean Corpuscular Hemoglobin


Concent 34.6 G/DL


(32.0-36.0)


 


Red Cell Distribution Width


  11.4 %


(11.6-14.8)  L


 


Platelet Count


  66 K/UL


(150-450)  L


 


Mean Platelet Volume


  9.1 FL


(6.5-10.1)


 


Neutrophils (%) (Auto)


  % (45.0-75.0)


 


 


Lymphocytes (%) (Auto)


  % (20.0-45.0)


 


 


Monocytes (%) (Auto)  % (1.0-10.0)  


 


Eosinophils (%) (Auto)  % (0.0-3.0)  


 


Basophils (%) (Auto)  % (0.0-2.0)  


 


Differential Total Cells


Counted 100  


 


 


Neutrophils % (Manual) 78 % (45-75)  H


 


Lymphocytes % (Manual) 19 % (20-45)  L


 


Monocytes % (Manual) 3 % (1-10)  


 


Eosinophils % (Manual) 0 % (0-3)  


 


Basophils % (Manual) 0 % (0-2)  


 


Band Neutrophils 0 % (0-8)  


 


Platelet Estimate Decreased  L


 


Platelet Morphology Normal  


 


Hypochromasia 1+  


 


Anisocytosis 1+  


 


Spherocytes 1+  


 


Sodium Level


  143 MMOL/L


(136-145)


 


Potassium Level


  3.8 MMOL/L


(3.5-5.1)


 


Chloride Level


  108 MMOL/L


()  H


 


Carbon Dioxide Level


  27 MMOL/L


(21-32)


 


Anion Gap


  8 mmol/L


(5-15)


 


Blood Urea Nitrogen


  10 mg/dL


(7-18)


 


Creatinine


  0.5 MG/DL


(0.55-1.30)  L


 


Estimat Glomerular Filtration


Rate > 60 mL/min


(>60)


 


Glucose Level


  299 MG/DL


()  H


 


Calcium Level


  8.4 MG/DL


(8.5-10.1)  L


 


Phosphorus Level


  2.7 MG/DL


(2.5-4.9)


 


Magnesium Level


  2.3 MG/DL


(1.8-2.4)


 


Total Bilirubin


  0.5 MG/DL


(0.2-1.0)


 


Aspartate Amino Transf


(AST/SGOT) 48 U/L (15-37)


H


 


Alanine Aminotransferase


(ALT/SGPT) 30 U/L (12-78)


 


 


Alkaline Phosphatase


  130 U/L


()  H


 


Total Protein


  6.1 G/DL


(6.4-8.2)  L


 


Albumin


  1.5 G/DL


(3.4-5.0)  L


 


Globulin 4.6 g/dL  


 


Albumin/Globulin Ratio


  0.3 (1.0-2.7)


L











Current Medications








 Medications


  (Trade)  Dose


 Ordered  Sig/Meghan


 Route


 PRN Reason  Start Time


 Stop Time Status Last Admin


Dose Admin


 


 Acetaminophen


  (Tylenol)  650 mg  Q4H  PRN


 ORAL


 MILD PAIN  4/16/20 16:46


 5/16/20 16:45   


 


 


 Acetaminophen


  (Tylenol)  650 mg  Q4H  PRN


 ORAL


 Temp >100.5  4/16/20 16:46


 5/16/20 16:45   


 


 


 Ascorbic Acid


  (Vitamin C)  250 mg  TWICE A  DAY


 ORAL


   4/16/20 18:00


 5/15/20 17:59  4/16/20 17:45


 


 


 Atorvastatin


 Calcium


  (Lipitor)  10 mg  BEDTIME


 ORAL


   4/16/20 21:00


 7/10/20 20:59   


 


 


 Ceftriaxone


 Sodium 1 gm/


 Dextrose  50 ml @ 


 100 mls/hr  Q24H


 IVPB


   4/16/20 21:00


 4/21/20 20:59   


 


 


 Dextrose


  (Dextrose 50%)  25 ml  Q30M  PRN


 IV


 Hypoglycemia  4/16/20 16:45


 7/9/20 21:44   


 


 


 Dextrose


  (Dextrose 50%)  50 ml  Q30M  PRN


 IV


 Hypoglycemia  4/16/20 16:45


 7/9/20 21:44   


 


 


 Gadobutrol


  (Gadavist)  7.5 mmol  NOW  PRN


 IV


 Radiology Procedure  4/16/20 22:00


 4/19/20 21:57   


 


 


 Insulin Aspart


  (NovoLOG)    AC+HS


 SUBQ


   4/16/20 21:00


 7/10/20 09:29   


 


 


 Insulin Aspart


  (NovoLOG)  12 units  NOVOTIAC


 SUBQ


   4/16/20 16:50


 7/11/20 11:49  4/16/20 17:11


 


 


 Insulin Detemir


  (Levemir)  30 units  QHS


 SUBQ


   4/16/20 21:00


 7/11/20 20:59   


 


 


 Levetiracetam


  (Keppra)  250 mg  Q12HR


 ORAL


   4/16/20 21:00


 5/29/20 20:59   


 


 


 Metoprolol


 Tartrate


  (Lopressor)  25 mg  Q12HR


 ORAL


   4/16/20 21:00


 7/10/20 20:59   


 


 


 Pantoprazole


  (Protonix)  40 mg  BID


 ORAL


   4/16/20 18:00


 5/14/20 08:59  4/16/20 17:45


 


 


 Potassium Chloride


  (K-Dur)  40 meq  BID


 ORAL


   4/16/20 18:00


 7/12/20 09:14  4/16/20 17:44


 


 


 Tramadol HCl


  (Ultram)  25 mg  Q6H  PRN


 ORAL


 Severe Breakthru Pain (>7)  4/16/20 16:48


 4/23/20 16:47   


 


 


 Vancomycin HCl


  (Firvanq)  125 mg  FOUR TIMES A  DAY


 ORAL


   4/16/20 18:00


 4/26/20 17:59  4/16/20 17:45


 

















Brijesh Looney MD Apr 16, 2020 19:17

## 2020-04-16 NOTE — GENERAL PROGRESS NOTE
Assessment/Plan


Problem List:  


(1) Abnormal thyroid blood test


ICD Codes:  R79.89 - Other specified abnormal findings of blood chemistry


SNOMED:  238486909, 141866229849088


(2) Diabetes mellitus out of control


ICD Codes:  E11.65 - Type 2 diabetes mellitus with hyperglycemia


SNOMED:  92033268, 780757259


(3) DKA (diabetic ketoacidoses)


ICD Codes:  E11.10 - Type 2 diabetes mellitus with ketoacidosis without coma


SNOMED:  039550708, 79668391


(4) STEMI (ST elevation myocardial infarction)


ICD Codes:  I21.3 - ST elevation (STEMI) myocardial infarction of unspecified 

site


SNOMED:  331964836


(5) Prolonged Q-T interval on ECG


ICD Codes:  R94.31 - Abnormal electrocardiogram [ECG] [EKG]


SNOMED:  826107269


(6) Atrial fibrillation with rapid ventricular response


ICD Codes:  I48.91 - Unspecified atrial fibrillation


SNOMED:  579998034297183


(7) Hypernatremia


ICD Codes:  E87.0 - Hyperosmolality and hypernatremia


SNOMED:  799727451, 73972288


Assessment/Plan:


increase Levemir to 30 units qhs 


increase Novolog to 12 units ac tid 


continue NISS ac / hs 





continue to hold Levothyroxine





Subjective


Allergies:  


Coded Allergies:  


     No Known Allergies (Unverified , 4/15/15)


Subjective


events noted 


interval notes reviewed 


glucose values are elevated in 200 range 











Item Value  Date Time


 


Bedside Blood Glucose 199 mg/dl H 4/16/20 1229


 


Bedside Blood Glucose 273 mg/dl H 4/16/20 0648


 


Bedside Blood Glucose 248 mg/dl H 4/16/20 0109


 


Bedside Blood Glucose 248 mg/dl H 4/15/20 2100


 


Bedside Blood Glucose 247 mg/dl H 4/15/20 1715


 


Bedside Blood Glucose 133 mg/dl H 4/15/20 1200











Objective





Last 24 Hour Vital Signs








  Date Time  Temp Pulse Resp B/P (MAP) Pulse Ox O2 Delivery O2 Flow Rate FiO2


 


4/16/20 12:00 98.1 91 22 121/93 (102) 98   


 


4/16/20 12:00  95      


 


4/16/20 10:44  99  107/63    


 


4/16/20 09:00      Room Air  


 


4/16/20 08:00  99      


 


4/16/20 08:00 98.4 99 18 107/63 (78) 100   


 


4/16/20 04:00  89      


 


4/16/20 04:00 98.0 95 18 116/69 (85) 90   


 


4/16/20 00:00  98      


 


4/16/20 00:00 97.7 97 18 128/68 (88) 97   


 


4/15/20 21:00      Room Air  


 


4/15/20 21:00  93  124/66    


 


4/15/20 20:01 97.0 93 18 124/66 (85) 96   


 


4/15/20 20:00 97.7 91 18 147/73 (97) 98   


 


4/15/20 16:00 98.6 101 20 121/61 (81) 96   


 


4/15/20 16:00      Room Air  


 


4/15/20 16:00  98      

















Intake and Output  


 


 4/15/20 4/16/20





 19:00 07:00


 


Intake Total 360 ml 


 


Output Total 1500 ml 1200 ml


 


Balance -1140 ml -1200 ml


 


  


 


Intake Oral 360 ml 


 


Output Urine Total 1500 ml 1200 ml


 


# Voids 1 


 


# Bowel Movements 6 2








Laboratory Tests


4/16/20 06:00: 


White Blood Count 5.8, Red Blood Count 3.63L, Hemoglobin 11.3L, Hematocrit 32.6L

, Mean Corpuscular Volume 90, Mean Corpuscular Hemoglobin 31.0, Mean 

Corpuscular Hemoglobin Concent 34.6, Red Cell Distribution Width 11.4L, 

Platelet Count 66L, Mean Platelet Volume 9.1, Neutrophils (%) (Auto) , 

Lymphocytes (%) (Auto) , Monocytes (%) (Auto) , Eosinophils (%) (Auto) , 

Basophils (%) (Auto) , Differential Total Cells Counted 100, Neutrophils % (

Manual) 78H, Lymphocytes % (Manual) 19L, Monocytes % (Manual) 3, Eosinophils % (

Manual) 0, Basophils % (Manual) 0, Band Neutrophils 0, Platelet Estimate 

DecreasedL, Platelet Morphology Normal, Hypochromasia 1+, Anisocytosis 1+, 

Spherocytes 1+, Sodium Level 143, Potassium Level 3.8, Chloride Level 108H, 

Carbon Dioxide Level 27, Anion Gap 8, Blood Urea Nitrogen 10, Creatinine 0.5L, 

Estimat Glomerular Filtration Rate > 60, Glucose Level 299H, Calcium Level 8.4L

, Phosphorus Level 2.7, Magnesium Level 2.3, Total Bilirubin 0.5, Aspartate 

Amino Transf (AST/SGOT) 48H, Alanine Aminotransferase (ALT/SGPT) 30, Alkaline 

Phosphatase 130H, Total Protein 6.1L, Albumin 1.5L, Globulin 4.6, Albumin/

Globulin Ratio 0.3L


Height (Feet):  5


Height (Inches):  4.00


Weight (Pounds):  135


General Appearance:  no apparent distress


Neck:  normal alignment


Cardiovascular:  normal rate


Respiratory/Chest:  lungs clear


Abdomen:  normal bowel sounds


Objective





Current Medications








 Medications


  (Trade)  Dose


 Ordered  Sig/Meghan


 Route


 PRN Reason  Start Time


 Stop Time Status Last Admin


Dose Admin


 


 Acetaminophen


  (Tylenol)  650 mg  Q4H  PRN


 ORAL


 Temp >100.5  4/12/20 13:15


 5/11/20 13:14   


 


 


 Acetaminophen


  (Tylenol)  650 mg  Q4H  PRN


 ORAL


 For Pain  4/12/20 14:00


 5/11/20 13:59   


 


 


 Ascorbic Acid


  (Vitamin C)  250 mg  TWICE A  DAY


 ORAL


   4/15/20 18:00


 5/15/20 17:59  4/16/20 10:38


 


 


 Atorvastatin


 Calcium


  (Lipitor)  10 mg  BEDTIME


 ORAL


   4/14/20 21:00


 7/10/20 20:59  4/15/20 21:00


 


 


 Ceftriaxone


 Sodium 1 gm/


 Dextrose  50 ml @ 


 100 mls/hr  Q24H


 IVPB


   4/14/20 21:00


 4/21/20 20:59  4/15/20 21:00


 


 


 Dextrose


  (Dextrose 50%)  25 ml  Q30M  PRN


 IV


 Hypoglycemia  4/12/20 13:15


 7/9/20 21:44   


 


 


 Dextrose


  (Dextrose 50%)  50 ml  Q30M  PRN


 IV


 Hypoglycemia  4/12/20 13:15


 7/9/20 21:44   


 


 


 Gadobutrol


  (Gadavist)  7.5 mmol  NOW  PRN


 IV


 Radiology Procedure  4/15/20 22:00


 4/19/20 21:57   


 


 


 Insulin Aspart


  (NovoLOG)    AC+HS


 SUBQ


   4/12/20 16:30


 7/10/20 09:29  4/16/20 12:28


 


 


 Insulin Aspart


  (NovoLOG)  10 units  NOVOTIAC


 SUBQ


   4/15/20 11:50


 7/11/20 11:49  4/16/20 12:29


 


 


 Insulin Detemir


  (Levemir)  24 units  QHS


 SUBQ


   4/15/20 21:00


 7/11/20 20:59  4/15/20 21:00


 


 


 Levetiracetam


  (Keppra)  250 mg  Q12HR


 ORAL


   4/14/20 21:00


 5/29/20 20:59  4/16/20 10:38


 


 


 Metoprolol


 Tartrate


  (Lopressor)  25 mg  Q12HR


 ORAL


   4/12/20 21:00


 7/10/20 20:59  4/16/20 10:44


 


 


 Pantoprazole


  (Protonix)  40 mg  BID


 ORAL


   4/15/20 09:00


 5/14/20 08:59  4/16/20 10:38


 


 


 Potassium Chloride


  (K-Dur)  40 meq  BID


 ORAL


   4/15/20 09:00


 7/12/20 09:14  4/16/20 10:37


 


 


 Tramadol HCl


  (Ultram)  25 mg  Q6H  PRN


 ORAL


 Severe Breakthru Pain (>7)  4/12/20 14:00


 4/18/20 13:59   


 


 


 Vancomycin HCl


  (Firvanq)  125 mg  FOUR TIMES A  DAY


 ORAL


   4/14/20 21:00


 4/24/20 20:59  4/16/20 12:24


 

















Kevin Mohr MD Apr 16, 2020 14:26

## 2020-04-16 NOTE — NUR
*-* INSURANCE *-*



UPDATED CLINICALS AND REVIEWS HAVE BEEN FAXED TO:



[facility]: ROBERT WAITE

T: [phone] (LEAVE A MESSAGE)

F: [phone]

## 2020-04-16 NOTE — GENERAL PROGRESS NOTE
Assessment/Plan


Assessment/Plan:


65-year-old female with PMH of medical noncompliance, DM type II who presents 

with b/l weakness, N/V, GLF, on admission pt found to be in DKA with 's.





#DKA - improved, 2/2 


#E. coli UTI


#E. coli bacteremia


#Uncontrolled DM, Hgb A1C 10.4


-continue in-patient medical care


-s/p DKA protocol with improved AG, AG 21-> 14


-CT abd reviewed


-4/10: UCx e. coli


-4/10: BCx w/E. coli 2/2, repeat 4/12 BCx NGTD


-cont. accuchecks, ISS qAC/HS


-ID, Dr. Looney: CTX, day #5 of abx, PO vanco for c diff


-Endo following: increase Levemir to 24 units qhs, increase Novolog to 10 units 

ac tid 





#Multilevel Thoracic BM enhancements


-suspicions for metastatic neoplasm, infiltrative neoplasm such as myeloma or 

lymphoma


-pt w/no renal failure, elevated Ca


-Consulted Heme/Onc, Dr. Levin, recs appreciated





#Right sided weakness


#Generalized Weakness


#SDH 3mm no midline shift


-weakness may be multifactorial, metabolic


-pt AOx3, pt noted fall 3 days ago


-found on CT head, repeat CT head stable


-4/14: CT head obtained given worsening weakness, stable compared to previous


-d/w Neuro, no need for transfer at this time, will decrease keppra


-cont. fall precautions


-PT treat and eval


-d/w Neurology, Dr. Cheney, weakness does not appear to be neurologic at this 

time


-CM for d/c planning, plan for CRI on d/c





#c. diff colitis


-pt w/multiple episodes of loose stool


-c.diff positive, contact precautions


-ID following: PO vanc





#Elevated d-dimer


#Tachycardia - resolved


-likely multifactorial given infection/DKA, PE has been ruled out


-EKG w/NSR


-CTA thorax negative for PE


-tachycardia likely 2/2 dehydration, encourage PO intake


-no OAC at this time





#Hypernatremia - resolved


#Hypokalemia


#Hypophosphatemia


-Hypernatremia likely 2/2 free water deficit


-replace electrolytes, cont. to monitor and replace PRN


-d/w nephro, cont. bruner for accurate I's and O's


-Nephro consulted, IVF and electrolytes per nephro





DVT - SCDs for now given SDH





Time spent: 40 minutes on this patient's case, d/w pt POC and updated RN.


Additional 35 mins non-face to face time was dedicated to coordination of care 

which included communication with Dr. Noni luising MRI findings, Dr. Jacobson regarding electrolytes, and Dr. Levin w/MRI findings.





Subjective


Allergies:  


Coded Allergies:  


     No Known Allergies (Unverified , 4/15/15)


Subjective


F/u DKA, UTI, hypernatremia, SDH.


Na improved. 


Pt cont. to have LE weakness, stable per pt. No bowel/urinary incontinence at 

this time or parathesia.





Objective





Last 24 Hour Vital Signs








  Date Time  Temp Pulse Resp B/P (MAP) Pulse Ox O2 Delivery O2 Flow Rate FiO2


 


4/16/20 10:44  99  107/63    


 


4/16/20 09:00      Room Air  


 


4/16/20 08:00  99      


 


4/16/20 08:00 98.4 99 18 107/63 (78) 100   


 


4/16/20 04:00  89      


 


4/16/20 04:00 98.0 95 18 116/69 (85) 90   


 


4/16/20 00:00  98      


 


4/16/20 00:00 97.7 97 18 128/68 (88) 97   


 


4/15/20 21:00      Room Air  


 


4/15/20 21:00  93  124/66    


 


4/15/20 20:01 97.0 93 18 124/66 (85) 96   


 


4/15/20 20:00 97.7 91 18 147/73 (97) 98   


 


4/15/20 16:00 98.6 101 20 121/61 (81) 96   


 


4/15/20 16:00      Room Air  


 


4/15/20 16:00  98      

















Intake and Output  


 


 4/15/20 4/16/20





 19:00 07:00


 


Intake Total 360 ml 


 


Output Total 1500 ml 1200 ml


 


Balance -1140 ml -1200 ml


 


  


 


Intake Oral 360 ml 


 


Output Urine Total 1500 ml 1200 ml


 


# Voids 1 


 


# Bowel Movements 6 2








Laboratory Tests


4/16/20 06:00: 


White Blood Count 5.8, Red Blood Count 3.63L, Hemoglobin 11.3L, Hematocrit 32.6L

, Mean Corpuscular Volume 90, Mean Corpuscular Hemoglobin 31.0, Mean 

Corpuscular Hemoglobin Concent 34.6, Red Cell Distribution Width 11.4L, 

Platelet Count 66L, Mean Platelet Volume 9.1, Neutrophils (%) (Auto) , 

Lymphocytes (%) (Auto) , Monocytes (%) (Auto) , Eosinophils (%) (Auto) , 

Basophils (%) (Auto) , Differential Total Cells Counted 100, Neutrophils % (

Manual) 78H, Lymphocytes % (Manual) 19L, Monocytes % (Manual) 3, Eosinophils % (

Manual) 0, Basophils % (Manual) 0, Band Neutrophils 0, Platelet Estimate 

DecreasedL, Platelet Morphology Normal, Hypochromasia 1+, Anisocytosis 1+, 

Spherocytes 1+, Sodium Level 143, Potassium Level 3.8, Chloride Level 108H, 

Carbon Dioxide Level 27, Anion Gap 8, Blood Urea Nitrogen 10, Creatinine 0.5L, 

Estimat Glomerular Filtration Rate > 60, Glucose Level 299H, Calcium Level 8.4L

, Phosphorus Level 2.7, Magnesium Level 2.3, Total Bilirubin 0.5, Aspartate 

Amino Transf (AST/SGOT) 48H, Alanine Aminotransferase (ALT/SGPT) 30, Alkaline 

Phosphatase 130H, Total Protein 6.1L, Albumin 1.5L, Globulin 4.6, Albumin/

Globulin Ratio 0.3L


Height (Feet):  5


Height (Inches):  4.00


Weight (Pounds):  135


Objective


General: NAD, laying in bed comfortably, AAO x3


HEENT: NCAT, EOMi, mucous membranes moist


CV: RRR, no murmurs, rubs, or gallops


Pulm: CTAB, No wheezes, rhonchi, or rales, no accessory muscle usage


GI: Soft, nontender, nondistended, bowel sounds present


: Bruner in place, draining yellow urine


Neuro: CN II-XII grossly intact, sensation intact bilaterally, MS 3/5 in UE, 0/

5 LE B/L, able to wiggle toes


Ext: no LE edema











Anitha Ochoa M.D. Apr 16, 2020 13:32

## 2020-04-16 NOTE — NUR
NURSE NOTES:

Patient asleep. Easy to arouse. Breathing unlabored on room air without distress. Denies 
pain or discomfort at this time. IV noted on left forearm and upper arm intact and patent. 
Patient on P200 mattress. Bed placed at the lowest with alarm, brake, and siderails up for 
safety. Call light placed within reach. Will continue to monitor.

## 2020-04-16 NOTE — NUR
NURSE NOTES:

Dr. Ochoa made aware one of acupuncture needle was found during MRI. Needle removed, and 
placed in shredder, no s/s bleeding.

## 2020-04-16 NOTE — NEUROLOGY PROGRESS NOTE
Interim History


Interim History


ROS Limited/Unobtainable:  No


Interim History


remains weak overall





Objective


Physical Exam





Last Vital Signs








  Date Time  Temp Pulse Resp B/P (MAP) Pulse Ox O2 Delivery O2 Flow Rate FiO2


 


4/16/20 21:00  103  109/60    


 


4/16/20 16:00 98.0  22  98   


 


4/16/20 09:00      Room Air  











Laboratory Tests








Test


  4/16/20


06:00


 


White Blood Count


  5.8 K/UL


(4.8-10.8)


 


Red Blood Count


  3.63 M/UL


(4.20-5.40)  L


 


Hemoglobin


  11.3 G/DL


(12.0-16.0)  L


 


Hematocrit


  32.6 %


(37.0-47.0)  L


 


Mean Corpuscular Volume 90 FL (80-99)  


 


Mean Corpuscular Hemoglobin


  31.0 PG


(27.0-31.0)


 


Mean Corpuscular Hemoglobin


Concent 34.6 G/DL


(32.0-36.0)


 


Red Cell Distribution Width


  11.4 %


(11.6-14.8)  L


 


Platelet Count


  66 K/UL


(150-450)  L


 


Mean Platelet Volume


  9.1 FL


(6.5-10.1)


 


Neutrophils (%) (Auto)


  % (45.0-75.0)


 


 


Lymphocytes (%) (Auto)


  % (20.0-45.0)


 


 


Monocytes (%) (Auto)  % (1.0-10.0)  


 


Eosinophils (%) (Auto)  % (0.0-3.0)  


 


Basophils (%) (Auto)  % (0.0-2.0)  


 


Differential Total Cells


Counted 100  


 


 


Neutrophils % (Manual) 78 % (45-75)  H


 


Lymphocytes % (Manual) 19 % (20-45)  L


 


Monocytes % (Manual) 3 % (1-10)  


 


Eosinophils % (Manual) 0 % (0-3)  


 


Basophils % (Manual) 0 % (0-2)  


 


Band Neutrophils 0 % (0-8)  


 


Platelet Estimate Decreased  L


 


Platelet Morphology Normal  


 


Hypochromasia 1+  


 


Anisocytosis 1+  


 


Spherocytes 1+  


 


Sodium Level


  143 MMOL/L


(136-145)


 


Potassium Level


  3.8 MMOL/L


(3.5-5.1)


 


Chloride Level


  108 MMOL/L


()  H


 


Carbon Dioxide Level


  27 MMOL/L


(21-32)


 


Anion Gap


  8 mmol/L


(5-15)


 


Blood Urea Nitrogen


  10 mg/dL


(7-18)


 


Creatinine


  0.5 MG/DL


(0.55-1.30)  L


 


Estimat Glomerular Filtration


Rate > 60 mL/min


(>60)


 


Glucose Level


  299 MG/DL


()  H


 


Calcium Level


  8.4 MG/DL


(8.5-10.1)  L


 


Phosphorus Level


  2.7 MG/DL


(2.5-4.9)


 


Magnesium Level


  2.3 MG/DL


(1.8-2.4)


 


Total Bilirubin


  0.5 MG/DL


(0.2-1.0)


 


Aspartate Amino Transf


(AST/SGOT) 48 U/L (15-37)


H


 


Alanine Aminotransferase


(ALT/SGPT) 30 U/L (12-78)


 


 


Alkaline Phosphatase


  130 U/L


()  H


 


Total Protein


  6.1 G/DL


(6.4-8.2)  L


 


Albumin


  1.5 G/DL


(3.4-5.0)  L


 


Globulin 4.6 g/dL  


 


Albumin/Globulin Ratio


  0.3 (1.0-2.7)


L








Head:  normocophalic


Neck:  no rigidity


EENT:  benign





Neurologic Exam


Mental Status:  awake


Speech:  normal speech


Language:  normal language


Cranial Nerve VII:  no facial asymmetry


Objective


alert, oriented x 2, conversant


UEs 5/5


LEs 1/5 flaccid 


cc 35 min





Impression/Recommendations


Problems:  


(1) STEMI (ST elevation myocardial infarction)


(2) Atrial fibrillation with rapid ventricular response


(3) Prolonged Q-T interval on ECG


(4) DKA (diabetic ketoacidoses)


(5) Hypernatremia


(6) UTI (urinary tract infection)


(7) Subdural hematoma


Diagnostic Impression





Given LE weakness , suspect myelopathy, mri cervical thoracic and lumbar ordered

- called radiology to expedite, imaging still not in system to review





 keppra to 250 mg bid


SBP < 150


PT OT


consider Osmin Chao MD Apr 16, 2020 22:37

## 2020-04-16 NOTE — NEPHROLOGY PROGRESS NOTE
Assessment/Plan


Problem List:  


(1) Hypernatremia


Assessment:  Improving





(2) UTI (urinary tract infection)


(3) Subdural hematoma


(4) DKA (diabetic ketoacidoses)


(5) Electrolyte imbalance


Assessment





Hypernatremia most likely due to free water deficit


Hypokalemia, hypophosphatemia


Hyperglycemia and DKA


Evidence of UTI


Right-sided weakness with 3 mm subdural hematoma found in CT scan


Plan


Discontinue IV fluid


Discontinue Vincent catheter


Change Protonix to p.o.


Stop Neutra-Phos due to diarrhea





Potassium and phosphorus and magnesium supplement as needed


Diet started as the patient is more alert


Low dose of Lopressor


Monitor renal parameters and electrolytes


Neuro eval noted


Keep the blood pressure and blood sugar in check


Urine studies


Per orders





Subjective


ROS Limited/Unobtainable:  No


Constitutional:  Reports: malaise





Objective


Objective





Last 24 Hour Vital Signs








  Date Time  Temp Pulse Resp B/P (MAP) Pulse Ox O2 Delivery O2 Flow Rate FiO2


 


4/16/20 04:00  89      


 


4/16/20 04:00 98.0 95 18 116/69 (85) 90   


 


4/16/20 00:00  98      


 


4/16/20 00:00 97.7 97 18 128/68 (88) 97   


 


4/15/20 21:00      Room Air  


 


4/15/20 21:00  93  124/66    


 


4/15/20 20:01 97.0 93 18 124/66 (85) 96   


 


4/15/20 20:00 97.7 91 18 147/73 (97) 98   


 


4/15/20 16:00 98.6 101 20 121/61 (81) 96   


 


4/15/20 16:00      Room Air  


 


4/15/20 16:00  98      


 


4/15/20 12:00 96.5 89 20 107/71 (83)    


 


4/15/20 12:00      Room Air  


 


4/15/20 12:00  89      


 


4/15/20 10:49  89  123/78    

















Intake and Output  


 


 4/15/20 4/16/20





 19:00 07:00


 


Intake Total 360 ml 


 


Output Total 1500 ml 1200 ml


 


Balance -1140 ml -1200 ml


 


  


 


Intake Oral 360 ml 


 


Output Urine Total 1500 ml 1200 ml


 


# Voids 1 


 


# Bowel Movements 6 2











Current Medications








 Medications


  (Trade)  Dose


 Ordered  Sig/Meghan


 Route


 PRN Reason  Start Time


 Stop Time Status Last Admin


Dose Admin


 


 Acetaminophen


  (Tylenol)  650 mg  Q4H  PRN


 ORAL


 Temp >100.5  4/12/20 13:15


 5/11/20 13:14   


 


 


 Acetaminophen


  (Tylenol)  650 mg  Q4H  PRN


 ORAL


 For Pain  4/12/20 14:00


 5/11/20 13:59   


 


 


 Ascorbic Acid


  (Vitamin C)  250 mg  TWICE A  DAY


 ORAL


   4/15/20 18:00


 5/15/20 17:59  4/15/20 18:20


 


 


 Atorvastatin


 Calcium


  (Lipitor)  10 mg  BEDTIME


 ORAL


   4/14/20 21:00


 7/10/20 20:59  4/15/20 21:00


 


 


 Ceftriaxone


 Sodium 1 gm/


 Dextrose  50 ml @ 


 100 mls/hr  Q24H


 IVPB


   4/14/20 21:00


 4/21/20 20:59  4/15/20 21:00


 


 


 Dextrose


  (Dextrose 50%)  25 ml  Q30M  PRN


 IV


 Hypoglycemia  4/12/20 13:15


 7/9/20 21:44   


 


 


 Dextrose


  (Dextrose 50%)  50 ml  Q30M  PRN


 IV


 Hypoglycemia  4/12/20 13:15


 7/9/20 21:44   


 


 


 Gadobutrol


  (Gadavist)  7.5 mmol  NOW  PRN


 IV


 Radiology Procedure  4/15/20 22:00


 4/19/20 21:57   


 


 


 Insulin Aspart


  (NovoLOG)    AC+HS


 SUBQ


   4/12/20 16:30


 7/10/20 09:29  4/16/20 06:48


 


 


 Insulin Aspart


  (NovoLOG)  10 units  NOVOTIAC


 SUBQ


   4/15/20 11:50


 7/11/20 11:49  4/16/20 06:47


 


 


 Insulin Detemir


  (Levemir)  24 units  QHS


 SUBQ


   4/15/20 21:00


 7/11/20 20:59  4/15/20 21:00


 


 


 Levetiracetam


  (Keppra)  250 mg  Q12HR


 ORAL


   4/14/20 21:00


 5/29/20 20:59  4/15/20 21:00


 


 


 Metoprolol


 Tartrate


  (Lopressor)  25 mg  Q12HR


 ORAL


   4/12/20 21:00


 7/10/20 20:59  4/15/20 21:00


 


 


 Pantoprazole


  (Protonix)  40 mg  BID


 ORAL


   4/15/20 09:00


 5/14/20 08:59  4/15/20 18:21


 


 


 Potassium Chloride


  (K-Dur)  40 meq  BID


 ORAL


   4/15/20 09:00


 7/12/20 09:14  4/15/20 18:21


 


 


 Tramadol HCl


  (Ultram)  25 mg  Q6H  PRN


 ORAL


 Severe Breakthru Pain (>7)  4/12/20 14:00


 4/18/20 13:59   


 


 


 Vancomycin HCl


  (Firvanq)  125 mg  FOUR TIMES A  DAY


 ORAL


   4/14/20 21:00


 4/24/20 20:59  4/15/20 21:00


 





Laboratory Tests


4/16/20 06:00: 


White Blood Count 5.8, Red Blood Count 3.63L, Hemoglobin 11.3L, Hematocrit 32.6L

, Mean Corpuscular Volume 90, Mean Corpuscular Hemoglobin 31.0, Mean 

Corpuscular Hemoglobin Concent 34.6, Red Cell Distribution Width 11.4L, 

Platelet Count 66L, Mean Platelet Volume 9.1, Neutrophils (%) (Auto) , 

Lymphocytes (%) (Auto) , Monocytes (%) (Auto) , Eosinophils (%) (Auto) , 

Basophils (%) (Auto) , Neutrophils % (Manual) [Pending], Lymphocytes % (Manual) 

[Pending], Platelet Estimate [Pending], Platelet Morphology [Pending], Sodium 

Level 143, Potassium Level 3.8, Chloride Level 108H, Carbon Dioxide Level 27, 

Anion Gap 8, Blood Urea Nitrogen 10, Creatinine 0.5L, Estimat Glomerular 

Filtration Rate > 60, Glucose Level 299H, Calcium Level 8.4L, Phosphorus Level 

2.7, Magnesium Level 2.3, Total Bilirubin 0.5, Aspartate Amino Transf (AST/SGOT

) 48H, Alanine Aminotransferase (ALT/SGPT) 30, Alkaline Phosphatase 130H, Total 

Protein 6.1L, Albumin 1.5L, Globulin 4.6, Albumin/Globulin Ratio 0.3L


Height (Feet):  5


Height (Inches):  4.00


Weight (Pounds):  135


General Appearance:  no apparent distress


Respiratory/Chest:  lungs clear


Abdomen:  soft


Objective


No change











Jonathan Jacobson MD Apr 16, 2020 09:49

## 2020-04-16 NOTE — DIAGNOSTIC IMAGING REPORT
Indication: Bilateral lower extremity weakness, unable to walk anymore

 

Technique: Sagittal T1 FLAIR PROPELLER, sagittal T2 PROPELLER, sagittal STIR axial T2

FRFSE, axial T2 FRFSE, axial T1, pre-and postcontrast axial and sagittal

fat-saturated T1 FSE images of the thoracic spine

 

Comparison: None. Reference made to chest CT angiogram 4/10/2020

 

Findings: Bony alignment is normal. Vertebral body heights are preserved. The disc

spaces are preserved. The intrinsic cord signal is normal.

 

There is markedly abnormal decreased T1 and T2 signal, slightly increased STIR signal

involving the T8 vertebral body, the T11 vertebral body, and to a subtle extent the

T5 and T2 vertebral bodies. The marrow signal abnormalities are or striking than

those in the cervical and lumbar spine. There is some enhancement of the T2-T5

vertebral bodies on the postcontrast images. T8 and T11 do not appear to enhance

significantly. Low T1 signal abnormality is also seen within the left T9 posterior

elements, to a slight extent the left T10 posterior elements, and the left T3

posterior elements. This enhances at T9

 

No significant disc bulge or protrusion, spinal stenosis, or neural foraminal

stenosis demonstrated.

 

Included extraspinal soft tissues are unremarkable.

 

Impression: Multilevel bone marrow signal abnormality, as described. Differential

considerations include  metastatic neoplasm, infiltrative neoplasm such as myeloma or

lymphoma, exuberant hematopoietic hyperplasia, less likely systemic metabolic

disorders. Note that this is much more striking in the thoracic spine than it is in

the lumbar and cervical segments study earlier

 

No evidence of significant neural impingement

## 2020-04-16 NOTE — NUR
TRANSFER TO FLOOR:

Patient transferred to , per Gabriela London.   Report given to DEREK Saravia.  Belongings and 
medications given to DEREK Saravia. Family and or S/O informed of transfer.

## 2020-04-16 NOTE — NUR
NOTE



SW received psychosocial information: 

Pt resides alone at [address], [address], [place] [address]. Pt has a caregiver 
and neighbors will provide some care upon DC. Thus, pt wants to return home, rather than 
going to [facility]. SW informed pt's decision to . 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 Pt has two 
sons; one living in [place] and one living in [place]. 



Lori Correa or Wojciech Correa (friend) [phone]  Mechellegiorgi Dorseyon (ex-) [phone]

## 2020-04-16 NOTE — NUR
NURSE NOTES:

Patient was transferred from [facility] to [facility] under Dr. Santhosh su.  Received report 
from DEREK Mckenna. patient is alert. forgetful, confused. verbally responsive. no respiratory 
distress noted. no pain at this time. IV on LFA22. LUA22 intact. p200 mattress for skin 
management. contact isolation d/t C-diff. no diarrhea at this time. bed in the lowest 
position and locked. call light within reach. will continue to provide plan of care.

## 2020-04-16 NOTE — DIAGNOSTIC IMAGING REPORT
Indication: Bilateral lower extremity weakness, unable to walk

 

Technique: Sagittal T1 and T2 fast spin echo, sagittal STIR, axial T1 and T2 fast

spin-echo images of the lumbar spine

 

Comparison: none

 

Findings: The vertebral body heights are preserved. The disc spaces are preserved.

The conus medullaris terminates at the T12-L1 level. Ill-defined areas of subtle

decreased marrow signal on the T1 and T2 images demonstrated, most striking on the

left side of the L1 vertebral body, the anterior aspect of the L2 vertebral body, and

to a slight extent the L5 vertebral body. There does not appear to be associated STIR

signal abnormality, however. The bony alignment is normal.

 

At L4-5, there is mild circumferential annular bulge. This, in combination with facet

and ligamentum flavum hypertrophy results in moderate narrowing of the spinal canal,

minimum AP diameter 7 mm. The disc space is preserved at this level.. This results in

very mild compromise of the left neural foramen. No significant right neural

foraminal compromise

 

At the remaining disc levels, no significant disc bulge or protrusion, spinal

stenosis, or neural foraminal stenosis.

 

The surrounding soft tissues are unremarkable.

 

Impression: Evidence of spinal stenosis, moderate, at L4-5.

 

No acute abnormality

 

Heterogeneous bone marrow, as described. On this exam, appearance is suggestive of

bone marrow reconversion to red marrow. However, subsequently performed thoracic

spine MRI (reported separately) demonstrated much more significant bone marrow

abnormality-please refer to that report for differential considerations

## 2020-04-17 VITALS — DIASTOLIC BLOOD PRESSURE: 69 MMHG | SYSTOLIC BLOOD PRESSURE: 118 MMHG

## 2020-04-17 VITALS — DIASTOLIC BLOOD PRESSURE: 67 MMHG | SYSTOLIC BLOOD PRESSURE: 114 MMHG

## 2020-04-17 VITALS — DIASTOLIC BLOOD PRESSURE: 64 MMHG | SYSTOLIC BLOOD PRESSURE: 118 MMHG

## 2020-04-17 VITALS — SYSTOLIC BLOOD PRESSURE: 101 MMHG | DIASTOLIC BLOOD PRESSURE: 75 MMHG

## 2020-04-17 VITALS — SYSTOLIC BLOOD PRESSURE: 110 MMHG | DIASTOLIC BLOOD PRESSURE: 61 MMHG

## 2020-04-17 VITALS — SYSTOLIC BLOOD PRESSURE: 115 MMHG | DIASTOLIC BLOOD PRESSURE: 65 MMHG

## 2020-04-17 LAB
ADD MANUAL DIFF: YES
ANION GAP SERPL CALC-SCNC: 10 MMOL/L (ref 5–15)
BUN SERPL-MCNC: 8 MG/DL (ref 7–18)
CALCIUM SERPL-MCNC: 8.1 MG/DL (ref 8.5–10.1)
CHLORIDE SERPL-SCNC: 110 MMOL/L (ref 98–107)
CO2 SERPL-SCNC: 26 MMOL/L (ref 21–32)
CREAT SERPL-MCNC: 0.4 MG/DL (ref 0.55–1.3)
ERYTHROCYTE [DISTWIDTH] IN BLOOD BY AUTOMATED COUNT: 11.3 % (ref 11.6–14.8)
HCT VFR BLD CALC: 32.4 % (ref 37–47)
HGB BLD-MCNC: 11.4 G/DL (ref 12–16)
MCV RBC AUTO: 89 FL (ref 80–99)
PLATELET # BLD: 82 K/UL (ref 150–450)
POTASSIUM SERPL-SCNC: 4 MMOL/L (ref 3.5–5.1)
RBC # BLD AUTO: 3.65 M/UL (ref 4.2–5.4)
SODIUM SERPL-SCNC: 146 MMOL/L (ref 136–145)
WBC # BLD AUTO: 7 K/UL (ref 4.8–10.8)

## 2020-04-17 PROCEDURE — 07DR3ZX EXTRACTION OF ILIAC BONE MARROW, PERCUTANEOUS APPROACH, DIAGNOSTIC: ICD-10-PCS

## 2020-04-17 RX ADMIN — INSULIN ASPART SCH UNITS: 100 INJECTION, SOLUTION INTRAVENOUS; SUBCUTANEOUS at 11:30

## 2020-04-17 RX ADMIN — DEXAMETHASONE SODIUM PHOSPHATE SCH MG: 4 INJECTION, SOLUTION INTRA-ARTICULAR; INTRALESIONAL; INTRAMUSCULAR; INTRAVENOUS; SOFT TISSUE at 06:04

## 2020-04-17 RX ADMIN — INSULIN ASPART SCH UNITS: 100 INJECTION, SOLUTION INTRAVENOUS; SUBCUTANEOUS at 06:07

## 2020-04-17 RX ADMIN — Medication SCH MG: at 09:39

## 2020-04-17 RX ADMIN — VANCOMYCIN HYDROCHLORIDE SCH MG: 500 INJECTION, POWDER, LYOPHILIZED, FOR SOLUTION INTRAVENOUS at 20:58

## 2020-04-17 RX ADMIN — VANCOMYCIN HYDROCHLORIDE SCH MG: 500 INJECTION, POWDER, LYOPHILIZED, FOR SOLUTION INTRAVENOUS at 18:00

## 2020-04-17 RX ADMIN — INSULIN DETEMIR SCH UNITS: 100 INJECTION, SOLUTION SUBCUTANEOUS at 21:12

## 2020-04-17 RX ADMIN — INSULIN ASPART SCH UNITS: 100 INJECTION, SOLUTION INTRAVENOUS; SUBCUTANEOUS at 16:30

## 2020-04-17 RX ADMIN — SODIUM CHLORIDE SCH MLS/HR: 0.9 INJECTION INTRAVENOUS at 20:58

## 2020-04-17 RX ADMIN — DEXAMETHASONE SODIUM PHOSPHATE SCH MG: 4 INJECTION, SOLUTION INTRA-ARTICULAR; INTRALESIONAL; INTRAMUSCULAR; INTRAVENOUS; SOFT TISSUE at 00:01

## 2020-04-17 RX ADMIN — INSULIN ASPART SCH UNITS: 100 INJECTION, SOLUTION INTRAVENOUS; SUBCUTANEOUS at 16:50

## 2020-04-17 RX ADMIN — INSULIN ASPART SCH UNITS: 100 INJECTION, SOLUTION INTRAVENOUS; SUBCUTANEOUS at 22:43

## 2020-04-17 RX ADMIN — INSULIN ASPART SCH UNITS: 100 INJECTION, SOLUTION INTRAVENOUS; SUBCUTANEOUS at 06:06

## 2020-04-17 RX ADMIN — VANCOMYCIN HYDROCHLORIDE SCH MG: 500 INJECTION, POWDER, LYOPHILIZED, FOR SOLUTION INTRAVENOUS at 11:02

## 2020-04-17 RX ADMIN — INSULIN ASPART SCH UNITS: 100 INJECTION, SOLUTION INTRAVENOUS; SUBCUTANEOUS at 11:50

## 2020-04-17 RX ADMIN — VANCOMYCIN HYDROCHLORIDE SCH MG: 500 INJECTION, POWDER, LYOPHILIZED, FOR SOLUTION INTRAVENOUS at 16:14

## 2020-04-17 RX ADMIN — Medication SCH MG: at 17:38

## 2020-04-17 NOTE — DIAGNOSTIC IMAGING REPORT
CLINICAL INDICATION:

 

TECHNIQUE: Patient ingested oral contrast.  IV administration nonionic

contrast.  Spiral acquisitions obtained through the chest, abdomen, and pelvis.  

Multiplanar  reconstructions were generated. Total dose length product 320 mGycm. 

CTDIvol(s) 2, 52, 3, 3 mGy. Radiation dose was minimized using automated exposure

control

 

COMPARISON: Chest compared to 4/12/2020 chest CT angiogram. Abdomen pelvis

compared to 4/10/2020

 

FINDINGS

 

Chest:  Interim development of trace bilateral pleural effusions. Compressive

atelectatic changes are seen posteriorly, primarily at the lung bases. There is

increased seen linear atelectasis at the left lung base as well as slightly increased

groundglass opacity at both lung bases. Small right costophrenic sulcus pleural-based

nodule measures 5 mm in diameter, appears slightly larger and more prominent than on

the previous study. 3 mm subpleural nodule on the left is seen along the major

fissure. The upper lobes are largely clear except for minimal posterior dependent

atelectatic changes on the left

 

The heart is upper limits of normal in size. The esophagus is mildly dilated

proximally but no downstream obstructing lesion is demonstrated. The included thyroid

is unremarkable. No axillary or chest wall mass or adenopathy.

 

The bones demonstrate very subtle slight heterogeneity to the attenuation of the

thoracic vertebral body segments

 

Abdomen pelvis: Unremarkable appendix. There is no evidence of colonic

diverticulosis or diverticulitis. No small bowel distention. Ingested contrast has

reached the distal ileum, not quite reaching the cecum. No small bowel wall

thickening. No free or loculated intraperitoneal gas or fluid is evident.

 

The liver is somewhat hypoattenuating relative to the spleen, average attenuation

measurements approximately 45 Hounsfield units lower, raising possibility of fatty

change. No focal abnormality. The gallbladder, bile ducts are unremarkable. The

pancreas is somewhat atrophic. The spleen is unremarkable. The adrenals are

unremarkable. The left kidney demonstrates heterogeneity to the lower pole, appearing

similar to the previous study. The bladder is distended. There is a bubble of gas

within the bladder lumen. The uterus and ovaries appear unremarkable. The bones are

unremarkable, without definite findings to corresponding to abnormalities described

on recent MRI.



IMPRESSION: No definite evidence of primary tumor or other findings to suggest

etiology of findings reported on recent thoracic and lumbar MRI

 

Trace bilateral pleural effusions, new since prior study of 4/10/2020 resultant

increased basilar compressive atelectatic changes

 

Slightly more conspicuous, since prior exam of one week earlier, pleural-based

nodules in the right costophrenic sulcus. Apparent interim growth over short period

suggests that this is inflammatory or an area of atelectasis

 

Unchanged 3 mm left lower lobe subpleural nodule along the major fissure. No further

follow-up necessary if there is no significant smoking history or other risk factors

for lung carcinoma. Short interval follow-up follow-up recommended if there are risk

factors

 

Area of heterogeneous attenuation of the left renal lower pole, also described

previously, somewhat less striking currently, likely representing an area of focal

nephritis. Infiltrating neoplasm as etiology of this finding is much less likely.

Correlate with laboratory findings

 

Bubble of gas within distended bladder. Most likely related to recent

instrumentation. If no history of such, the possibility of cystitis with a

gas-forming organism should be considered.

 

The CT scanner at [facility] is accredited by the American College of

Radiology and the scans are performed using protocols designed to limit radiation

exposure to as low as reasonably achievable to attain images of sufficient resolution

adequate for diagnostic evaluation.

## 2020-04-17 NOTE — NUR
NURSE NOTES:

Patient in bed, awake and alert x4. On room air with no signs of distress or SOB. IV intact. 
Side rails padded for seizure precautions. Bed locked and in lowest position. Call light in 
easy reach. Will continue to monitor the patient.

## 2020-04-17 NOTE — GENERAL PROGRESS NOTE
Assessment/Plan


Assessment/Plan:


65-year-old female with PMH of medical noncompliance, DM type II who presents 

with b/l weakness, N/V, GLF, on admission pt found to be in DKA with 's.





#DKA - improved, 2/2 


#E. coli UTI


#E. coli bacteremia


#Uncontrolled DM, Hgb A1C 10.4


#c. diff colitis


-continue in-patient medical care


-s/p DKA protocol with improved AG, AG 21-> 14


-CT abd reviewed


-4/10: UCx e. coli


-4/10: BCx w/E. coli 2/2, repeat 4/12 BCx NGTD


-c.diff positive, contact precautions


-cont. accuchecks, ISS qAC/HS


-Endo following: Levemir to 30 units qhs, increase Novolog to 12 units ac tid


-ID, Dr. Looney: CTX, day #6 of abx, PO vanco for c diff





#Multilevel Thoracic BM enhancements, concern for malignancy


-suspicions for metastatic neoplasm, infiltrative neoplasm such as myeloma or 

lymphoma


-4/10: CT abd/pel w/o contrast reviewed with no abnormalities


-CEA, C19 pending


-will obtain CT chest/abd/pel w/IV contrast


-d/w Heme/Onc, Dr. Levin, plan for BM biopsy today


-d/w Pt's sister, Carin Wheeler [phone], updated her on pt's hospital 

course, POC, Pt has apparently been having loose stools/diarrhea for severel 

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 Sister stated pt has lost weight but has been 

suspecting "stomach CA", pt with no previous h/o of Ulises. Answered all questions 

to pt and pt's sister's satisfaction





#Right sided weakness


#Generalized Weakness


#SDH 3mm no midline shift


-weakness may be multifactorial, metabolic


-pt AOx3, pt noted fall 3 days ago


-found on CT head, repeat CT head stable


-4/14: CT head obtained given worsening weakness, stable compared to previous


-d/w Neuro, no need for transfer at this time, will decrease keppra


-cont. fall precautions


-holding ASA given SDH


-statin


-PT treat and eval


-d/w Neurology, Dr. Cheney, weakness does not appear to be neurologic at this 

time


-CM for d/c planning, plan for CRI on d/c








#Elevated d-dimer


#Tachycardia - resolved


-likely multifactorial given infection/DKA, PE has been ruled out


-EKG w/NSR


-CTA thorax negative for PE


-tachycardia likely 2/2 dehydration, encourage PO intake


-no OAC at this time





#Hypernatremia - resolved


#Hypokalemia - improved


#Hypophosphatemia


-Hypernatremia likely 2/2 free water deficit


-replace electrolytes, cont. to monitor and replace PRN


-bruner d/c'd


-Nephro following, IVF and electrolytes per nephro





DVT - SCDs for now given SDH





Time spent: 35 minutes on this patient's case, d/w pt POC, updated RN. D/w Dr. Levin POC/BM biopsy, and Pt's sister w/POC and dispo planning.





Time of note may not reflect time of encounter.





Subjective


Allergies:  


Coded Allergies:  


     No Known Allergies (Unverified , 4/15/15)


Subjective


F/u DKA, UTI, hypernatremia, SDH, MRI enhancements on thoracic spine concerning 

for malignancy.


For BM biopsy today. Pt notes right sided lower rib pain, denies SOB, 

palpitations, diaphoresis, states pain is worse on palpation.


D/w pt and pt's sister, Carin Deal, pt is concerned she has "stomach 

cancer", pt noted loose stools x2-3 months with subsequent weight loss, pt does 

not know how much weight has been lost. 


Per sister, pt is more alert today.





Objective





Last 24 Hour Vital Signs








  Date Time  Temp Pulse Resp B/P (MAP) Pulse Ox O2 Delivery O2 Flow Rate FiO2


 


4/17/20 08:00 98.0 83 19 115/65 (82) 95   


 


4/17/20 04:00 98.4 91 19 110/61 (77) 98   


 


4/17/20 00:00 98.0 107 20 101/75 (84) 97   


 


4/16/20 21:00  103  109/60    


 


4/16/20 21:00      Room Air  


 


4/16/20 20:00 98.7 109 20 111/70 (84) 98   


 


4/16/20 16:00  95      


 


4/16/20 16:00 98.0 91 22 112/59 (76) 98   


 


4/16/20 12:00 98.1 91 22 121/93 (102) 98   


 


4/16/20 12:00  95      


 


4/16/20 10:44  99  107/63    


 


4/16/20 09:00      Room Air  

















Intake and Output  


 


 4/16/20 4/17/20





 19:00 07:00


 


Intake Total  50 ml


 


Balance  50 ml


 


  


 


IV Total  50 ml


 


# Voids  2








Laboratory Tests


4/17/20 05:35: 


White Blood Count 7.0, Red Blood Count 3.65L, Hemoglobin 11.4L, Hematocrit 32.4L

, Mean Corpuscular Volume 89, Mean Corpuscular Hemoglobin 31.3H, Mean 

Corpuscular Hemoglobin Concent 35.2, Red Cell Distribution Width 11.3L, 

Platelet Count 82L, Mean Platelet Volume 8.2, Neutrophils (%) (Auto) , 

Lymphocytes (%) (Auto) , Monocytes (%) (Auto) , Eosinophils (%) (Auto) , 

Basophils (%) (Auto) , Neutrophils % (Manual) [Pending], Lymphocytes % (Manual) 

[Pending], Platelet Estimate [Pending], Platelet Morphology [Pending], Sodium 

Level 146H, Potassium Level 4.0, Chloride Level 110H, Carbon Dioxide Level 26, 

Anion Gap 10, Blood Urea Nitrogen 8, Creatinine 0.4L, Estimat Glomerular 

Filtration Rate > 60, Glucose Level 160#H, Calcium Level 8.1L, Total Protein (

PEP) [Pending], Albumin (PEP) [Pending], Globulin (PEP) [Pending], Albumin/

Globulin Ratio [Pending], Alpha-1-Globulins [Pending], Alpha-2-Globulins [

Pending], Beta Globulins [Pending], Beta Gamma Globulin [Pending], PEP Abnormal 

Protein Bands [Pending], Protein Electrophoresis Interpret [Pending], 

Carcinoembryonic Antigen [Pending], CA 19-9 Antigen [Pending]


Height (Feet):  5


Height (Inches):  4.00


Weight (Pounds):  135


Objective


General: NAD, sitting up in bed comfortably, AAO x3, alert today


HEENT: NCAT, EOMi, mucous membranes moist


CV: RRR, no murmurs, rubs, or gallops


Pulm: CTAB, No wheezes, rhonchi, or rales, no accessory muscle usage


GI: Soft, nontender, nondistended, bowel sounds present


Neuro: CN II-XII grossly intact, sensation intact bilaterally, MS 3/5 in UE, 0/

5 LE B/L, able to wiggle toes


Ext: no LE edema, no bruising noted on rt lower ribs


MSK: reproducible pain on right lower ribs











Anitha Ochoa M.D. Apr 17, 2020 08:50

## 2020-04-17 NOTE — PATHOLOGY BONE BARROW
Bone Marrow Aspirate & Biopsy


.


PROCEDURE: Bone Marrow Aspirate and Biopsy





INDICATION: Bone lesions





PROCEDURE : Betsy Ramirez M.D.





CONSENT: Consent was previously obtained from the patient. The risks and 

benefits were re-explained. The patient agreed to undergo the procedure.


A TIMEOUT WAS EXECUTED: Yes





PROCEDURE SUMMARY:


The patient was laid in the side position. The left posterior iliac crest was 

prepped and draped in a sterile fashion. The crest of the posterior iliac was 

located, and the skin as well as surface of the bone was anesthetized with 2 % 

lidocaine. An aspirating needle was introduced, the bone marrow aspirate was 

obtained without any difficulty. This was withdrawn the coring needle was 

advanced into the bone cavity.  A bone marrow biopsy was obtained without any 

complications.





ESTIMATED BLOOD LOSS: Negligible





REPORTS TO FOLLOW











BETSY RAMIREZ Apr 17, 2020 13:19

## 2020-04-17 NOTE — NEUROLOGY PROGRESS NOTE
Interim History


Interim History


ROS Limited/Unobtainable:  No


Interim History


more alert, pending ct C/A/P


started steroids





Objective


Physical Exam





Last Vital Signs








  Date Time  Temp Pulse Resp B/P (MAP) Pulse Ox O2 Delivery O2 Flow Rate FiO2


 


4/17/20 16:00 97.7 89 17 118/69 (85) 95   


 


4/17/20 09:00      Room Air  











Laboratory Tests








Test


  4/17/20


05:35


 


White Blood Count


  7.0 K/UL


(4.8-10.8)


 


Red Blood Count


  3.65 M/UL


(4.20-5.40)  L


 


Hemoglobin


  11.4 G/DL


(12.0-16.0)  L


 


Hematocrit


  32.4 %


(37.0-47.0)  L


 


Mean Corpuscular Volume 89 FL (80-99)  


 


Mean Corpuscular Hemoglobin


  31.3 PG


(27.0-31.0)  H


 


Mean Corpuscular Hemoglobin


Concent 35.2 G/DL


(32.0-36.0)


 


Red Cell Distribution Width


  11.3 %


(11.6-14.8)  L


 


Platelet Count


  82 K/UL


(150-450)  L


 


Mean Platelet Volume


  8.2 FL


(6.5-10.1)


 


Neutrophils (%) (Auto)


  % (45.0-75.0)


 


 


Lymphocytes (%) (Auto)


  % (20.0-45.0)


 


 


Monocytes (%) (Auto)  % (1.0-10.0)  


 


Eosinophils (%) (Auto)  % (0.0-3.0)  


 


Basophils (%) (Auto)  % (0.0-2.0)  


 


Differential Total Cells


Counted 100  


 


 


Neutrophils % (Manual) 92 % (45-75)  H


 


Lymphocytes % (Manual) 7 % (20-45)  L


 


Monocytes % (Manual) 1 % (1-10)  


 


Eosinophils % (Manual) 0 % (0-3)  


 


Basophils % (Manual) 0 % (0-2)  


 


Band Neutrophils 0 % (0-8)  


 


Platelet Estimate Decreased  L


 


Platelet Morphology Normal  


 


Red Blood Cell Morphology Normal  


 


Sodium Level


  146 MMOL/L


(136-145)  H


 


Potassium Level


  4.0 MMOL/L


(3.5-5.1)


 


Chloride Level


  110 MMOL/L


()  H


 


Carbon Dioxide Level


  26 MMOL/L


(21-32)


 


Anion Gap


  10 mmol/L


(5-15)


 


Blood Urea Nitrogen


  8 mg/dL (7-18)


 


 


Creatinine


  0.4 MG/DL


(0.55-1.30)  L


 


Estimat Glomerular Filtration


Rate > 60 mL/min


(>60)


 


Glucose Level


  160 MG/DL


()  #H


 


Calcium Level


  8.1 MG/DL


(8.5-10.1)  L


 


Total Protein (PEP) Pending  


 


Albumin (PEP) Pending  


 


Globulin (PEP) Pending  


 


Albumin/Globulin Ratio Pending  


 


Alpha-1-Globulins Pending  


 


Alpha-2-Globulins Pending  


 


Beta Globulins Pending  


 


Beta Gamma Globulin Pending  


 


PEP Abnormal Protein Bands Pending  


 


Protein Electrophoresis


Interpret Pending  


 


 


Carcinoembryonic Antigen Pending  


 


CA 19-9 Antigen Pending  








Head:  normocophalic


Neck:  no rigidity


EENT:  benign





Neurologic Exam


Mental Status:  awake


Speech:  normal speech


Language:  normal language


Cranial Nerve VII:  no facial asymmetry


Objective


alert, oriented x 2, conversant


UEs 5/5


LEs 1/5 flaccid 


cc 35 min





Impression/Recommendations


Problems:  


(1) STEMI (ST elevation myocardial infarction)


(2) Atrial fibrillation with rapid ventricular response


(3) Prolonged Q-T interval on ECG


(4) DKA (diabetic ketoacidoses)


(5) Hypernatremia


(6) UTI (urinary tract infection)


(7) Subdural hematoma


Diagnostic Impression





Given LE weakness , suspect myelopathy, mri cervical thoracic and lumbar ordered

- called radiology to expedite, imaging still not in system to review





 keppra to 250 mg bid


SBP < 150


PT OT


consider Osmin Chao MD Apr 17, 2020 18:37

## 2020-04-17 NOTE — GENERAL PROGRESS NOTE
Assessment/Plan


Problem List:  


(1) Abnormal thyroid blood test


ICD Codes:  R79.89 - Other specified abnormal findings of blood chemistry


SNOMED:  891320885, 685538965521075


(2) Diabetes mellitus out of control


ICD Codes:  E11.65 - Type 2 diabetes mellitus with hyperglycemia


SNOMED:  27587150, 672606289


(3) DKA (diabetic ketoacidoses)


ICD Codes:  E11.10 - Type 2 diabetes mellitus with ketoacidosis without coma


SNOMED:  067824901, 38351245


(4) STEMI (ST elevation myocardial infarction)


ICD Codes:  I21.3 - ST elevation (STEMI) myocardial infarction of unspecified 

site


SNOMED:  379708907


(5) Prolonged Q-T interval on ECG


ICD Codes:  R94.31 - Abnormal electrocardiogram [ECG] [EKG]


SNOMED:  496738772


(6) Atrial fibrillation with rapid ventricular response


ICD Codes:  I48.91 - Unspecified atrial fibrillation


SNOMED:  104294681848451


(7) Hypernatremia


ICD Codes:  E87.0 - Hyperosmolality and hypernatremia


SNOMED:  431127765, 10510029


Assessment/Plan:


continue Levemir 30 units qhs 


continue Novolog 12 units ac tid 


continue NISS ac / hs 





continue to hold Levothyroxine





Subjective


Allergies:  


Coded Allergies:  


     No Known Allergies (Unverified , 4/15/15)


Subjective


events noted 


interval notes reviewed 


glucose values improved after yesterday's insulin adjustment 











Item Value  Date Time


 


Bedside Blood Glucose 153 mg/dl H 4/17/20 0623


 


Bedside Blood Glucose 159 mg/dl H 4/16/20 2100


 


Bedside Blood Glucose 207 mg/dl H 4/16/20 1711


 


Bedside Blood Glucose 199 mg/dl H 4/16/20 1229











Objective





Last 24 Hour Vital Signs








  Date Time  Temp Pulse Resp B/P (MAP) Pulse Ox O2 Delivery O2 Flow Rate FiO2


 


4/17/20 09:39  83  115/65    


 


4/17/20 08:00 98.0 83 19 115/65 (82) 95   


 


4/17/20 04:00 98.4 91 19 110/61 (77) 98   


 


4/17/20 00:00 98.0 107 20 101/75 (84) 97   


 


4/16/20 21:00  103  109/60    


 


4/16/20 21:00      Room Air  


 


4/16/20 20:00 98.7 109 20 111/70 (84) 98   


 


4/16/20 16:00  95      


 


4/16/20 16:00 98.0 91 22 112/59 (76) 98   

















Intake and Output  


 


 4/16/20 4/17/20





 19:00 07:00


 


Intake Total  50 ml


 


Balance  50 ml


 


  


 


IV Total  50 ml


 


# Voids  2








Laboratory Tests


4/17/20 05:35: 


White Blood Count 7.0, Red Blood Count 3.65L, Hemoglobin 11.4L, Hematocrit 32.4L

, Mean Corpuscular Volume 89, Mean Corpuscular Hemoglobin 31.3H, Mean 

Corpuscular Hemoglobin Concent 35.2, Red Cell Distribution Width 11.3L, 

Platelet Count 82L, Mean Platelet Volume 8.2, Neutrophils (%) (Auto) , 

Lymphocytes (%) (Auto) , Monocytes (%) (Auto) , Eosinophils (%) (Auto) , 

Basophils (%) (Auto) , Differential Total Cells Counted 100, Neutrophils % (

Manual) 92H, Lymphocytes % (Manual) 7L, Monocytes % (Manual) 1, Eosinophils % (

Manual) 0, Basophils % (Manual) 0, Band Neutrophils 0, Platelet Estimate 

DecreasedL, Platelet Morphology Normal, Red Blood Cell Morphology Normal, 

Sodium Level 146H, Potassium Level 4.0, Chloride Level 110H, Carbon Dioxide 

Level 26, Anion Gap 10, Blood Urea Nitrogen 8, Creatinine 0.4L, Estimat 

Glomerular Filtration Rate > 60, Glucose Level 160#H, Calcium Level 8.1L, Total 

Protein (PEP) [Pending], Albumin (PEP) [Pending], Globulin (PEP) [Pending], 

Albumin/Globulin Ratio [Pending], Alpha-1-Globulins [Pending], Alpha-2-

Globulins [Pending], Beta Globulins [Pending], Beta Gamma Globulin [Pending], 

PEP Abnormal Protein Bands [Pending], Protein Electrophoresis Interpret [Pending

], Carcinoembryonic Antigen [Pending], CA 19-9 Antigen [Pending]


Height (Feet):  5


Height (Inches):  4.00


Weight (Pounds):  135


General Appearance:  no apparent distress


Neck:  normal alignment


Cardiovascular:  normal rate


Respiratory/Chest:  lungs clear


Abdomen:  normal bowel sounds


Objective





Current Medications








 Medications


  (Trade)  Dose


 Ordered  Sig/Meghan


 Route


 PRN Reason  Start Time


 Stop Time Status Last Admin


Dose Admin


 


 Acetaminophen


  (Tylenol)  650 mg  Q6H  PRN


 ORAL


 Mild Pain (Pain Scale 1-3)  4/17/20 10:00


 5/17/20 09:59   


 


 


 Ascorbic Acid


  (Vitamin C)  250 mg  TWICE A  DAY


 ORAL


   4/16/20 18:00


 5/15/20 17:59  4/17/20 09:39


 


 


 Atorvastatin


 Calcium


  (Lipitor)  10 mg  BEDTIME


 ORAL


   4/16/20 21:00


 7/10/20 20:59  4/16/20 21:17


 


 


 Barium Sulfate


  (Readi-Cat 2)  450 ml  NOW  PRN


 ORAL


 Radiology Procedure  4/17/20 11:30


 4/19/20 11:29   


 


 


 Ceftriaxone


 Sodium 1 gm/


 Dextrose  50 ml @ 


 100 mls/hr  Q24H


 IVPB


   4/16/20 21:00


 4/21/20 20:59  4/16/20 21:22


 


 


 Dexamethasone


 Sodium Phosphate


  (Decadron 4mg/ml


 vial)  4 mg  Q6HR


 IVP


   4/17/20 00:00


 7/16/20 00:00  4/17/20 06:04


 


 


 Dextrose


  (Dextrose 50%)  25 ml  Q30M  PRN


 IV


 Hypoglycemia  4/16/20 16:45


 7/9/20 21:44   


 


 


 Dextrose


  (Dextrose 50%)  50 ml  Q30M  PRN


 IV


 Hypoglycemia  4/16/20 16:45


 7/9/20 21:44   


 


 


 Gadobutrol


  (Gadavist)  7.5 mmol  NOW  PRN


 IV


 Radiology Procedure  4/16/20 22:00


 4/19/20 21:57   


 


 


 Insulin Aspart


  (NovoLOG)    AC+HS


 SUBQ


   4/16/20 21:00


 7/10/20 09:29  4/17/20 06:07


 


 


 Insulin Aspart


  (NovoLOG)  12 units  NOVOTIAC


 SUBQ


   4/16/20 16:50


 7/11/20 11:49  4/17/20 06:06


 


 


 Insulin Detemir


  (Levemir)  30 units  QHS


 SUBQ


   4/16/20 21:00


 7/11/20 20:59  4/16/20 20:57


 


 


 Iohexol


  (OMNIPAQUE-300


 100ml)  100 ml  ONCE  PRN


 INJ


 radiology procedure  4/17/20 11:30


 4/19/20 11:29   


 


 


 Levetiracetam


  (Keppra)  250 mg  Q12HR


 ORAL


   4/16/20 21:00


 5/29/20 20:59  4/17/20 09:39


 


 


 Lorazepam


  (Ativan)  1 mg  Q6H  PRN


 ORAL


 For Anxiety  4/17/20 08:30


 4/24/20 08:29   


 


 


 Metoprolol


 Tartrate


  (Lopressor)  25 mg  Q12HR


 ORAL


   4/16/20 21:00


 7/10/20 20:59  4/17/20 09:39


 


 


 Pantoprazole


  (Protonix)  40 mg  BID


 ORAL


   4/16/20 18:00


 5/14/20 08:59  4/17/20 09:39


 


 


 Potassium Chloride


  (K-Dur)  40 meq  BID


 ORAL


   4/16/20 18:00


 7/12/20 09:14  4/17/20 09:39


 


 


 Tramadol HCl


  (Ultram)  25 mg  Q6H  PRN


 ORAL


 Severe Breakthru Pain (>7)  4/16/20 16:48


 4/23/20 16:47   


 


 


 Vancomycin HCl


  (Firvanq)  125 mg  FOUR TIMES A  DAY


 ORAL


   4/16/20 18:00


 4/26/20 17:59  4/17/20 11:02


 

















Kevin Mohr MD Apr 17, 2020 13:03

## 2020-04-17 NOTE — CONSULTATION
History of Present Illness


General


Chief Complaint:  Abnormal Labs


Reason for Consultation:  ams





Present Illness


Allergies:  


Coded Allergies:  


     No Known Allergies (Unverified , 4/15/15)





Medication History


Scheduled


No Known Medications* (NKM - No Known Medications*), 0 ., (Reported)





Patient History


Healthcare decision maker





Resuscitation status


Full Code


Advanced Directive on File


No





Physical Exam





Last 24 Hour Vital Signs








  Date Time  Temp Pulse Resp B/P (MAP) Pulse Ox O2 Delivery O2 Flow Rate FiO2


 


4/17/20 04:00 98.4 91 19 110/61 (77) 98   


 


4/17/20 00:00 98.0 107 20 101/75 (84) 97   


 


4/16/20 21:00  103  109/60    


 


4/16/20 21:00      Room Air  


 


4/16/20 20:00 98.7 109 20 111/70 (84) 98   


 


4/16/20 16:00  95      


 


4/16/20 16:00 98.0 91 22 112/59 (76) 98   


 


4/16/20 12:00 98.1 91 22 121/93 (102) 98   


 


4/16/20 12:00  95      


 


4/16/20 10:44  99  107/63    


 


4/16/20 09:00      Room Air  


 


4/16/20 08:00  99      


 


4/16/20 08:00 98.4 99 18 107/63 (78) 100   

















Intake and Output  


 


 4/16/20 4/17/20





 19:00 07:00


 


Intake Total  50 ml


 


Balance  50 ml


 


  


 


IV Total  50 ml


 


# Voids  2











Laboratory Tests








Test


  4/17/20


05:35


 


White Blood Count


  7.0 K/UL


(4.8-10.8)


 


Red Blood Count


  3.65 M/UL


(4.20-5.40)  L


 


Hemoglobin


  11.4 G/DL


(12.0-16.0)  L


 


Hematocrit


  32.4 %


(37.0-47.0)  L


 


Mean Corpuscular Volume 89 FL (80-99)  


 


Mean Corpuscular Hemoglobin


  31.3 PG


(27.0-31.0)  H


 


Mean Corpuscular Hemoglobin


Concent 35.2 G/DL


(32.0-36.0)


 


Red Cell Distribution Width


  11.3 %


(11.6-14.8)  L


 


Platelet Count


  82 K/UL


(150-450)  L


 


Mean Platelet Volume


  8.2 FL


(6.5-10.1)


 


Neutrophils (%) (Auto)


  % (45.0-75.0)


 


 


Lymphocytes (%) (Auto)


  % (20.0-45.0)


 


 


Monocytes (%) (Auto)  % (1.0-10.0)  


 


Eosinophils (%) (Auto)  % (0.0-3.0)  


 


Basophils (%) (Auto)  % (0.0-2.0)  


 


Neutrophils % (Manual) Pending  


 


Lymphocytes % (Manual) Pending  


 


Platelet Estimate Pending  


 


Platelet Morphology Pending  


 


Sodium Level


  146 MMOL/L


(136-145)  H


 


Potassium Level


  4.0 MMOL/L


(3.5-5.1)


 


Chloride Level


  110 MMOL/L


()  H


 


Carbon Dioxide Level


  26 MMOL/L


(21-32)


 


Anion Gap


  10 mmol/L


(5-15)


 


Blood Urea Nitrogen


  8 mg/dL (7-18)


 


 


Creatinine


  0.4 MG/DL


(0.55-1.30)  L


 


Estimat Glomerular Filtration


Rate > 60 mL/min


(>60)


 


Glucose Level


  160 MG/DL


()  #H


 


Calcium Level


  8.1 MG/DL


(8.5-10.1)  L








Height (Feet):  5


Height (Inches):  4.00


Weight (Pounds):  135


Medications





Current Medications








 Medications


  (Trade)  Dose


 Ordered  Sig/Meghan


 Route


 PRN Reason  Start Time


 Stop Time Status Last Admin


Dose Admin


 


 Acetaminophen


  (Tylenol)  650 mg  Q4H  PRN


 ORAL


 MILD PAIN  4/16/20 16:46


 5/16/20 16:45   


 


 


 Acetaminophen


  (Tylenol)  650 mg  Q4H  PRN


 ORAL


 Temp >100.5  4/16/20 16:46


 5/16/20 16:45   


 


 


 Ascorbic Acid


  (Vitamin C)  250 mg  TWICE A  DAY


 ORAL


   4/16/20 18:00


 5/15/20 17:59  4/16/20 17:45


 


 


 Atorvastatin


 Calcium


  (Lipitor)  10 mg  BEDTIME


 ORAL


   4/16/20 21:00


 7/10/20 20:59  4/16/20 21:17


 


 


 Ceftriaxone


 Sodium 1 gm/


 Dextrose  50 ml @ 


 100 mls/hr  Q24H


 IVPB


   4/16/20 21:00


 4/21/20 20:59  4/16/20 21:22


 


 


 Dexamethasone


 Sodium Phosphate


  (Decadron 4mg/ml


 vial)  4 mg  Q6HR


 IVP


   4/17/20 00:00


 7/16/20 00:00  4/17/20 06:04


 


 


 Dextrose


  (Dextrose 50%)  25 ml  Q30M  PRN


 IV


 Hypoglycemia  4/16/20 16:45


 7/9/20 21:44   


 


 


 Dextrose


  (Dextrose 50%)  50 ml  Q30M  PRN


 IV


 Hypoglycemia  4/16/20 16:45


 7/9/20 21:44   


 


 


 Gadobutrol


  (Gadavist)  7.5 mmol  NOW  PRN


 IV


 Radiology Procedure  4/16/20 22:00


 4/19/20 21:57   


 


 


 Insulin Aspart


  (NovoLOG)    AC+HS


 SUBQ


   4/16/20 21:00


 7/10/20 09:29  4/17/20 06:07


 


 


 Insulin Aspart


  (NovoLOG)  12 units  NOVOTIAC


 SUBQ


   4/16/20 16:50


 7/11/20 11:49  4/17/20 06:06


 


 


 Insulin Detemir


  (Levemir)  30 units  QHS


 SUBQ


   4/16/20 21:00


 7/11/20 20:59  4/16/20 20:57


 


 


 Levetiracetam


  (Keppra)  250 mg  Q12HR


 ORAL


   4/16/20 21:00


 5/29/20 20:59  4/16/20 21:17


 


 


 Metoprolol


 Tartrate


  (Lopressor)  25 mg  Q12HR


 ORAL


   4/16/20 21:00


 7/10/20 20:59   


 


 


 Pantoprazole


  (Protonix)  40 mg  BID


 ORAL


   4/16/20 18:00


 5/14/20 08:59  4/16/20 17:45


 


 


 Potassium Chloride


  (K-Dur)  40 meq  BID


 ORAL


   4/16/20 18:00


 7/12/20 09:14  4/16/20 17:44


 


 


 Tramadol HCl


  (Ultram)  25 mg  Q6H  PRN


 ORAL


 Severe Breakthru Pain (>7)  4/16/20 16:48


 4/23/20 16:47   


 


 


 Vancomycin HCl


  (Firvanq)  125 mg  FOUR TIMES A  DAY


 ORAL


   4/16/20 18:00


 4/26/20 17:59  4/16/20 21:22


 











Assessment/Plan


Assessment/Plan:


Hematology Consultation





DOM MD: Kylie Ochoa


RFC: Lumbar disc neoplasm i.e. lymphoma v myeloma


DOS: 4/17/2020





ID: 


65-year-old female with history of diabetes on insulin presents for weakness 

and elevated blood sugar levels.  She arrives from home by EMS with glucose 

readings greater than 500 on fingerstick per EMS.  She has had 5 days of 

vomiting and diarrhea.  Unable tolerate food, water or her medications.  Denies 

fever or chills.  Denies cough or chest pain.  Feels profoundly weak.  Denies 

dysuria hematuria. Heme consulted for iamging of spine as noted below, she 

herself has no personal hx of cancer.


 


PMH: Insulin-dependent diabetes


 


PSH: Reviewed


 


Allergies: None reported


 


Social Hx: None reported


Allergies:  


Coded Allergies:  


     No Known Allergies (Unverified , 4/15/15)





COVID-19 Screening


Contact w/high risk pt:  No


Recent Travel to affected area:  No


Experienced COVID-19 symptoms?:  No





Nursing Documentation-PMH


Hx Cardiac Problems:  Yes


Hx Diabetes:  Yes





Review of Systems


All Other Systems:  negative except mentioned in HPI





Physical Exam


General: Awake and somnolent


HEENT: NC/AT. EOMI. dry mucous membranes


Cardiovascular: RRR.  S1 and S2 normal.


Resp: Normal work of breathing. No cough, wheezing or crackles appreciated


Abdomen: Abdomen is soft, nondistended


Skin: Intact.  No abrasions, laceration or rash over the exposed skin


MSK: Normal tone and bulk.


Neuro: Awake and alert. 





Labs; noted





Imaging: reviewed





Assessment and Recs:


# Multilevel bone marrow signal abnormality, as described. Differential 

considerations include  metastatic neoplasm, infiltrative neoplasm such as 

myeloma or lymphoma, exuberant hematopoietic hyperplasia, less likely systemic 

metabolic disorders. Note that this is much more striking in the thoracic spine 

than it is in the lumbar and cervical segments study earlier


--> tumor markers have been ordered


--> spep and upep


--> bone marrow biopsy has ordered


--> r/o hemolysis at this time, DIC


--> dw pcp


# DKA (diabetic ketoacidoses)


--> as per endo, iss and accuchecks qac and qhs


--> hgb a1c goal <8, now 10.4


# Hypernatremia


--> as per renal recs


--> continue ivfs


# UTI (urinary tract infection)


--> per id, on abx, ceftriax, vanc


# Ground-level fall.


--> pt/ot as needed


# Nausea and vomiting.


--> zofran prn


# Right sided weakness


# Generalized Weakness


# SDH 3mm no midline shift


# Elevated d-dimer


# Tachycardia - resolved





Appreciate consultation and dw Rn











Dmitry Levin MD Apr 17, 2020 07:56

## 2020-04-17 NOTE — NUR
*-* INSURANCE *-*



UPDATED CLINICALS AND REVIEWS HAVE BEEN FAXED TO:



[facility]: ROBERT WAITE

T: [phone] (LEAVE A MESSAGE)

F: [phone]

## 2020-04-17 NOTE — NUR
RD ASSESSMENT & RECOMMENDATIONS

SEE CARE ACTIVITY FOR COMPLETE ASSESSMENT



DAILY ESTIMATED NEEDS:

Needs based on DM 61.5kg  

25-30  kcals/kg 

1416-6579  total kcals

1.25-1.5  g protein/kg

77-92  g total protein 

25-30  mL/kg

3251-2937  total fluid mLs



NUTRITION DIAGNOSIS:

* Increased kcal/prot intake needs R/T wound healing as evidenced by pt

now w/ rapid onset DTI @ sacrum and nonblanching erythema @ BL heels.

* Altered nutrition related lab values r/t DKA, clinical status as

evidenced by A1C 10.4,  on adm, (+) acetone, now w/ critically elev

Na (161*->146) and low K(2.4* -> wnl).





CURRENT DIET: CCHO LOW puree + NTL     

 



PO DIET RECOMMENDATIONS:

CCHO LOW diet/ texture per SLP    



 



ADDITIONAL RECOMMENDATIONS:

1) W/ variable po intake add Glucerna 1 tetra BID 

3) Wound care: Continue Vit C/ Kenny 1pkt BID added to tray 

                     add MVIx1 + ZnSO4 220mg QD x 10 days 

4) Obtain a calibrated bed scale wts 

     -> now w/ added p200 mattress, stated wt= 54kg (119lbs) 

5) Consider SLP re-evaluation for possible texture upgrade for improved PO acceptance.

## 2020-04-17 NOTE — HEMATOLOGY/ONC PROGRESS NOTE
Assessment/Plan


Assessment/Plan


Assessment and Recs:


# Multilevel bone marrow signal abnormality, as described. Differential 

considerations include  metastatic neoplasm, infiltrative neoplasm such as 

myeloma or lymphoma, exuberant hematopoietic hyperplasia, less likely systemic 

metabolic disorders. Note that this is much more striking in the thoracic spine 

than it is in the lumbar and cervical segments study earlier


--> tumor markers have been ordered


--> spep and upep


--> bone marrow biopsy has ordered


--> also ordered for ct c/a/p with iv contrast


--> r/o hemolysis at this time, DIC


--> dw pcp


# DKA (diabetic ketoacidoses)


--> as per endo, iss and accuchecks qac and qhs


--> hgb a1c goal <8, now 10.4


# Hypernatremia


--> as per renal recs


--> continue ivfs


# UTI (urinary tract infection)


--> per id, on abx: ceftriaxone 


# Ground-level fall.


--> pt/ot as needed


# Nausea and vomiting.


--> zofran prn


# Right sided weakness


# Generalized Weakness


# SDH 3mm no midline shift


# Elevated d-dimer


# Tachycardia - resolved





Appreciate consultation and leoncio Rn





Subjective


HEENT:  Denies: no symptoms, eye pain, blurred vision, tearing, double vision, 

ear pain, ear discharge, nose pain, nose congestion, throat pain, throat 

swelling, mouth pain, mouth swelling, other


Cardiovascular:  Denies: no symptoms, chest pain, edema, irregular heart rate, 

lightheadedness, palpitations, syncope, other


Respiratory:  Denies: no symptoms, cough, shortness of breath, SOB with 

excertion, SOB at rest, sputum, wheezing, other


Gastrointestinal/Abdominal:  Denies: no symptoms, abdomen distended, abdominal 

pain, black stools, tarry stools, blood in stool, constipated, diarrhea, 

difficulty swallowing, nausea, poor appetite, poor fluid intake, rectal bleeding

, vomiting, other


Genitourinary:  Denies: no symptoms, burning, discharge, frequency, flank pain, 

hematuria, incontinence, pain, urgency, other


Neurologic/Psychiatric:  Denies: no symptoms, anxiety, depressed, emotional 

problems, headache, numbness, paresthesia, pre-existing deficit, seizure, 

tingling, tremors, weakness, other


Endocrine:  Denies: no symptoms, excessive sweating, flushing, intolerance to 

cold, intolerance to heat, increased hunger, increased thirst, increased urine, 

unexplained weight gain, unexplained weight loss, other


Allergies:  


Coded Allergies:  


     No Known Allergies (Unverified , 4/15/15)


Subjective


4/17 cxr and labs reviewed, on room air, bp stable, bone marrow biopsy ordered





Objective


Objective





Current Medications








 Medications


  (Trade)  Dose


 Ordered  Sig/Meghan


 Route


 PRN Reason  Start Time


 Stop Time Status Last Admin


Dose Admin


 


 Acetaminophen


  (Tylenol)  650 mg  Q6H  PRN


 ORAL


 Mild Pain (Pain Scale 1-3)  4/17/20 10:00


 5/17/20 09:59   


 


 


 Ascorbic Acid


  (Vitamin C)  250 mg  TWICE A  DAY


 ORAL


   4/16/20 18:00


 5/15/20 17:59  4/17/20 09:39


 


 


 Atorvastatin


 Calcium


  (Lipitor)  10 mg  BEDTIME


 ORAL


   4/16/20 21:00


 7/10/20 20:59  4/16/20 21:17


 


 


 Barium Sulfate


  (Readi-Cat 2)  450 ml  NOW  PRN


 ORAL


 Radiology Procedure  4/17/20 11:30


 4/19/20 11:29   


 


 


 Ceftriaxone


 Sodium 1 gm/


 Dextrose  50 ml @ 


 100 mls/hr  Q24H


 IVPB


   4/16/20 21:00


 4/21/20 20:59  4/16/20 21:22


 


 


 Dexamethasone


 Sodium Phosphate


  (Decadron 4mg/ml


 vial)  4 mg  Q6HR


 IVP


   4/17/20 00:00


 7/16/20 00:00  4/17/20 06:04


 


 


 Dextrose


  (Dextrose 50%)  25 ml  Q30M  PRN


 IV


 Hypoglycemia  4/16/20 16:45


 7/9/20 21:44   


 


 


 Dextrose


  (Dextrose 50%)  50 ml  Q30M  PRN


 IV


 Hypoglycemia  4/16/20 16:45


 7/9/20 21:44   


 


 


 Gadobutrol


  (Gadavist)  7.5 mmol  NOW  PRN


 IV


 Radiology Procedure  4/16/20 22:00


 4/19/20 21:57   


 


 


 Insulin Aspart


  (NovoLOG)    AC+HS


 SUBQ


   4/16/20 21:00


 7/10/20 09:29  4/17/20 06:07


 


 


 Insulin Aspart


  (NovoLOG)  12 units  NOVOTIAC


 SUBQ


   4/16/20 16:50


 7/11/20 11:49  4/17/20 06:06


 


 


 Insulin Detemir


  (Levemir)  30 units  QHS


 SUBQ


   4/16/20 21:00


 7/11/20 20:59  4/16/20 20:57


 


 


 Iohexol


  (OMNIPAQUE-300


 100ml)  100 ml  ONCE  PRN


 INJ


 radiology procedure  4/17/20 11:30


 4/19/20 11:29   


 


 


 Levetiracetam


  (Keppra)  250 mg  Q12HR


 ORAL


   4/16/20 21:00


 5/29/20 20:59  4/17/20 09:39


 


 


 Lorazepam


  (Ativan)  1 mg  Q6H  PRN


 ORAL


 For Anxiety  4/17/20 08:30


 4/24/20 08:29   


 


 


 Metoprolol


 Tartrate


  (Lopressor)  25 mg  Q12HR


 ORAL


   4/16/20 21:00


 7/10/20 20:59  4/17/20 09:39


 


 


 Pantoprazole


  (Protonix)  40 mg  BID


 ORAL


   4/16/20 18:00


 5/14/20 08:59  4/17/20 09:39


 


 


 Potassium Chloride


  (K-Dur)  40 meq  BID


 ORAL


   4/16/20 18:00


 7/12/20 09:14  4/17/20 09:39


 


 


 Tramadol HCl


  (Ultram)  25 mg  Q6H  PRN


 ORAL


 Severe Breakthru Pain (>7)  4/16/20 16:48


 4/23/20 16:47   


 


 


 Vancomycin HCl


  (Firvanq)  125 mg  FOUR TIMES A  DAY


 ORAL


   4/16/20 18:00


 4/26/20 17:59  4/17/20 11:02


 











Last 24 Hour Vital Signs








  Date Time  Temp Pulse Resp B/P (MAP) Pulse Ox O2 Delivery O2 Flow Rate FiO2


 


4/17/20 09:39  83  115/65    


 


4/17/20 08:00 98.0 83 19 115/65 (82) 95   


 


4/17/20 04:00 98.4 91 19 110/61 (77) 98   


 


4/17/20 00:00 98.0 107 20 101/75 (84) 97   


 


4/16/20 21:00  103  109/60    


 


4/16/20 21:00      Room Air  


 


4/16/20 20:00 98.7 109 20 111/70 (84) 98   


 


4/16/20 16:00  95      


 


4/16/20 16:00 98.0 91 22 112/59 (76) 98   


 


4/16/20 12:00 98.1 91 22 121/93 (102) 98   


 


4/16/20 12:00  95      


 


4/16/20 10:44  99  107/63    


 


4/16/20 09:00      Room Air  


 


4/16/20 08:00  99      


 


4/16/20 08:00 98.4 99 18 107/63 (78) 100   


 


4/16/20 04:00  89      


 


4/16/20 04:00 98.0 95 18 116/69 (85) 90   


 


4/16/20 00:00  98      


 


4/16/20 00:00 97.7 97 18 128/68 (88) 97   


 


4/15/20 21:00      Room Air  


 


4/15/20 21:00  93  124/66    


 


4/15/20 20:01 97.0 93 18 124/66 (85) 96   


 


4/15/20 20:00 97.7 91 18 147/73 (97) 98   


 


4/15/20 16:00 98.6 101 20 121/61 (81) 96   


 


4/15/20 16:00      Room Air  


 


4/15/20 16:00  98      

















Intake and Output  


 


 4/16/20 4/17/20





 19:00 07:00


 


Intake Total  50 ml


 


Balance  50 ml


 


  


 


IV Total  50 ml


 


# Voids  2











Labs








Test


  4/15/20


05:55 4/16/20


06:00 4/17/20


05:35


 


White Blood Count


  6.0 K/UL


(4.8-10.8) 5.8 K/UL


(4.8-10.8) 7.0 K/UL


(4.8-10.8)


 


Red Blood Count


  3.66 M/UL


(4.20-5.40) 3.63 M/UL


(4.20-5.40) 3.65 M/UL


(4.20-5.40)


 


Hemoglobin


  11.7 G/DL


(12.0-16.0) 11.3 G/DL


(12.0-16.0) 11.4 G/DL


(12.0-16.0)


 


Hematocrit


  32.8 %


(37.0-47.0) 32.6 %


(37.0-47.0) 32.4 %


(37.0-47.0)


 


Mean Corpuscular Volume 90 FL (80-99)  90 FL (80-99)  89 FL (80-99) 


 


Mean Corpuscular Hemoglobin


  31.9 PG


(27.0-31.0) 31.0 PG


(27.0-31.0) 31.3 PG


(27.0-31.0)


 


Mean Corpuscular Hemoglobin


Concent 35.6 G/DL


(32.0-36.0) 34.6 G/DL


(32.0-36.0) 35.2 G/DL


(32.0-36.0)


 


Red Cell Distribution Width


  12.0 %


(11.6-14.8) 11.4 %


(11.6-14.8) 11.3 %


(11.6-14.8)


 


Platelet Count


  50 K/UL


(150-450) 66 K/UL


(150-450) 82 K/UL


(150-450)


 


Mean Platelet Volume


  8.3 FL


(6.5-10.1) 9.1 FL


(6.5-10.1) 8.2 FL


(6.5-10.1)


 


Neutrophils (%) (Auto)  % (45.0-75.0)   % (45.0-75.0)   % (45.0-75.0) 


 


Lymphocytes (%) (Auto)  % (20.0-45.0)   % (20.0-45.0)   % (20.0-45.0) 


 


Monocytes (%) (Auto)  % (1.0-10.0)   % (1.0-10.0)   % (1.0-10.0) 


 


Eosinophils (%) (Auto)  % (0.0-3.0)   % (0.0-3.0)   % (0.0-3.0) 


 


Basophils (%) (Auto)  % (0.0-2.0)   % (0.0-2.0)   % (0.0-2.0) 


 


Differential Total Cells


Counted 100 


  100 


  100 


 


 


Neutrophils % (Manual) 67 % (45-75)  78 % (45-75)  92 % (45-75) 


 


Lymphocytes % (Manual) 26 % (20-45)  19 % (20-45)  7 % (20-45) 


 


Monocytes % (Manual) 6 % (1-10)  3 % (1-10)  1 % (1-10) 


 


Eosinophils % (Manual) 1 % (0-3)  0 % (0-3)  0 % (0-3) 


 


Basophils % (Manual) 0 % (0-2)  0 % (0-2)  0 % (0-2) 


 


Band Neutrophils 0 % (0-8)  0 % (0-8)  0 % (0-8) 


 


Platelet Estimate Decreased  Decreased  Decreased 


 


Platelet Morphology Normal  Normal  Normal 


 


Hypochromasia 1+  1+  


 


Anisocytosis 1+  1+  


 


Sodium Level


  151 MMOL/L


(136-145) 143 MMOL/L


(136-145) 146 MMOL/L


(136-145)


 


Potassium Level


  3.9 MMOL/L


(3.5-5.1) 3.8 MMOL/L


(3.5-5.1) 4.0 MMOL/L


(3.5-5.1)


 


Chloride Level


  115 MMOL/L


() 108 MMOL/L


() 110 MMOL/L


()


 


Carbon Dioxide Level


  27 MMOL/L


(21-32) 27 MMOL/L


(21-32) 26 MMOL/L


(21-32)


 


Anion Gap


  9 mmol/L


(5-15) 8 mmol/L


(5-15) 10 mmol/L


(5-15)


 


Blood Urea Nitrogen


  9 mg/dL (7-18) 


  10 mg/dL


(7-18) 8 mg/dL (7-18) 


 


 


Creatinine


  0.5 MG/DL


(0.55-1.30) 0.5 MG/DL


(0.55-1.30) 0.4 MG/DL


(0.55-1.30)


 


Estimat Glomerular Filtration


Rate > 60 mL/min


(>60) > 60 mL/min


(>60) > 60 mL/min


(>60)


 


Glucose Level


  247 MG/DL


() 299 MG/DL


() 160 MG/DL


()


 


Calcium Level


  7.9 MG/DL


(8.5-10.1) 8.4 MG/DL


(8.5-10.1) 8.1 MG/DL


(8.5-10.1)


 


Phosphorus Level


  2.2 MG/DL


(2.5-4.9) 2.7 MG/DL


(2.5-4.9) 


 


 


Magnesium Level


  2.3 MG/DL


(1.8-2.4) 2.3 MG/DL


(1.8-2.4) 


 


 


Total Bilirubin


  0.3 MG/DL


(0.2-1.0) 0.5 MG/DL


(0.2-1.0) 


 


 


Aspartate Amino Transf


(AST/SGOT) 45 U/L (15-37) 


  48 U/L (15-37) 


  


 


 


Alanine Aminotransferase


(ALT/SGPT) 36 U/L (12-78) 


  30 U/L (12-78) 


  


 


 


Alkaline Phosphatase


  122 U/L


() 130 U/L


() 


 


 


C-Reactive Protein,


Quantitative 12.0 mg/dL


(0.00-0.90) 


  


 


 


Total Protein


  5.7 G/DL


(6.4-8.2) 6.1 G/DL


(6.4-8.2) 


 


 


Albumin


  1.6 G/DL


(3.4-5.0) 1.5 G/DL


(3.4-5.0) 


 


 


Globulin 4.1 g/dL  4.6 g/dL  


 


Albumin/Globulin Ratio 0.4 (1.0-2.7)  0.3 (1.0-2.7)  


 


Spherocytes  1+  


 


Red Blood Cell Morphology   Normal 








Height (Feet):  5


Height (Inches):  4.00


Weight (Pounds):  135


Objective


Physical Exam


General: Awake and somnolent


HEENT: NC/AT. EOMI. dry mucous membranes


Cardiovascular: RRR.  S1 and S2 normal.


Resp: Normal work of breathing. No cough, wheezing or crackles appreciated


Abdomen: Abdomen is soft, nondistended


Skin: Intact.  No abrasions, laceration or rash over the exposed skin


MSK: Normal tone and bulk.


Neuro: Awake and alert.











Dmitry Levin MD Apr 17, 2020 12:54

## 2020-04-17 NOTE — NUR
NOTE



S/W PATIENT AT BEDSIDE WITH JALEN GUERRA WHO TRANSLATED IN RE TO REFERRAL TO [facility]. 
PATIENT VERBALIZED AGREEMENT TO PLACEMENT TO [facility]. PATIENT ALSO INFORMED OF OUTSTANDING 
INSURANCE PREMIUM FOR 4/2020. PATIENT STATES SHE USUALLY SENDS PAYMENT BY MAIL BUT HAS NOT 
BEEN ABLE TO MAIL PAYMENT SINCE SHES BEEN HOSPITALIZED. 

CALL MADE TO [employer] @ [phone] (CALL REFERENCE #[id number]). S/W MELCHOR. CONFIRMED 
PATIENTS PENDING PREMIUM PMT FOR 4/2020. CONFIRMED THAT MEMBER CAN DO A CHECK BY PHONE PMT 
BY CALLING THE ABOVE NUMBER. 

CALL MADE TO DIR [place] OF ADMISSIONS @ EXT [phone]. NO ANSWER.  LEFT REQUESTING CALL BACK TO 
THIS CM.

JALEN GUERRA INFORMED OF CM CALL TO PATIENTS INSURANCE.

## 2020-04-17 NOTE — SURGERY PROGRESS NOTE
Surgery Progress Note


Subjective


Additional Comments


pending bone marrow biopsy


labs noted


exam stable





Objective





Last 24 Hour Vital Signs








  Date Time  Temp Pulse Resp B/P (MAP) Pulse Ox O2 Delivery O2 Flow Rate FiO2


 


4/17/20 09:39  83  115/65    


 


4/17/20 08:00 98.0 83 19 115/65 (82) 95   


 


4/17/20 04:00 98.4 91 19 110/61 (77) 98   


 


4/17/20 00:00 98.0 107 20 101/75 (84) 97   


 


4/16/20 21:00  103  109/60    


 


4/16/20 21:00      Room Air  


 


4/16/20 20:00 98.7 109 20 111/70 (84) 98   


 


4/16/20 16:00  95      


 


4/16/20 16:00 98.0 91 22 112/59 (76) 98   


 


4/16/20 12:00 98.1 91 22 121/93 (102) 98   


 


4/16/20 12:00  95      








I&O











Intake and Output  


 


 4/16/20 4/17/20





 19:00 07:00


 


Intake Total  50 ml


 


Balance  50 ml


 


  


 


IV Total  50 ml


 


# Voids  2








Dressing:  saturated


Wound:  other


Drains:  other


Cardiovascular:  RSR


Respiratory:  decreased breath sounds


Abdomen:  soft, non-tender, present bowel sounds


Extremities:  no tenderness, no cyanosis





Laboratory Tests








Test


  4/17/20


05:35


 


White Blood Count


  7.0 K/UL


(4.8-10.8)


 


Red Blood Count


  3.65 M/UL


(4.20-5.40)  L


 


Hemoglobin


  11.4 G/DL


(12.0-16.0)  L


 


Hematocrit


  32.4 %


(37.0-47.0)  L


 


Mean Corpuscular Volume 89 FL (80-99)  


 


Mean Corpuscular Hemoglobin


  31.3 PG


(27.0-31.0)  H


 


Mean Corpuscular Hemoglobin


Concent 35.2 G/DL


(32.0-36.0)


 


Red Cell Distribution Width


  11.3 %


(11.6-14.8)  L


 


Platelet Count


  82 K/UL


(150-450)  L


 


Mean Platelet Volume


  8.2 FL


(6.5-10.1)


 


Neutrophils (%) (Auto)


  % (45.0-75.0)


 


 


Lymphocytes (%) (Auto)


  % (20.0-45.0)


 


 


Monocytes (%) (Auto)  % (1.0-10.0)  


 


Eosinophils (%) (Auto)  % (0.0-3.0)  


 


Basophils (%) (Auto)  % (0.0-2.0)  


 


Differential Total Cells


Counted 100  


 


 


Neutrophils % (Manual) 92 % (45-75)  H


 


Lymphocytes % (Manual) 7 % (20-45)  L


 


Monocytes % (Manual) 1 % (1-10)  


 


Eosinophils % (Manual) 0 % (0-3)  


 


Basophils % (Manual) 0 % (0-2)  


 


Band Neutrophils 0 % (0-8)  


 


Platelet Estimate Decreased  L


 


Platelet Morphology Normal  


 


Red Blood Cell Morphology Normal  


 


Sodium Level


  146 MMOL/L


(136-145)  H


 


Potassium Level


  4.0 MMOL/L


(3.5-5.1)


 


Chloride Level


  110 MMOL/L


()  H


 


Carbon Dioxide Level


  26 MMOL/L


(21-32)


 


Anion Gap


  10 mmol/L


(5-15)


 


Blood Urea Nitrogen


  8 mg/dL (7-18)


 


 


Creatinine


  0.4 MG/DL


(0.55-1.30)  L


 


Estimat Glomerular Filtration


Rate > 60 mL/min


(>60)


 


Glucose Level


  160 MG/DL


()  #H


 


Calcium Level


  8.1 MG/DL


(8.5-10.1)  L


 


Total Protein (PEP) Pending  


 


Albumin (PEP) Pending  


 


Globulin (PEP) Pending  


 


Albumin/Globulin Ratio Pending  


 


Alpha-1-Globulins Pending  


 


Alpha-2-Globulins Pending  


 


Beta Globulins Pending  


 


Beta Gamma Globulin Pending  


 


PEP Abnormal Protein Bands Pending  


 


Protein Electrophoresis


Interpret Pending  


 


 


Carcinoembryonic Antigen Pending  


 


CA 19-9 Antigen Pending  











Plan


Problems:  


(1) Decubitus skin ulcer


Assessment & Plan:  Patient noted to have rapid onset Sacral DTPI. Per Primary 

Nurse pt noted on prior shift to have non-blanching erythema as per report.  

All preventive measures were taken to reposition patient and off-load pressure.

  Despite repositioning and applying Moisture Barrier Paste patient noted to 

have further skin decline. 


Sacral DTPI is irregular shaped, Purple and indurated in colour over 

sacrococcygeal with surrounding non-blanching erythema along borders.(L)7.1cm x 

(W)6.5cm. 


Nutritional intake varies 30-45% daily. 


Non-Blanching erythema without fluctuance noted to R and L heels.


Both heels observed floated with pillows Off mattress.





Tx.Plan: 


Apply Moisture Barrier Paste to Sacrum. Cover with Optifoam drsg. Change every 

3 days and prn.


            


Apply Cavilon Skin Barrier to both heels. Cover each heel with Optifoam drsg. 

Change every 7 days and prn.


            


Reposition at least every 2hours or as tolerated.


            


Off-load heels with pillow.


            


APM/VIK Mattress overlay.





MRI spine noted


neuro input appreciated 





(2) Deep tissue injury


(3) Malnutrition


Assessment & Plan:  DAILY ESTIMATED NEEDS:


Needs based on DM 61.5kg  


25-30  kcals/kg 


7497-1015  total kcals


1.25-1.5  g protein/kg


77-92  g total protein 


25-30  mL/kg


5334-3995  total fluid mLs





NUTRITION DIAGNOSIS:


Altered nutrition related lab values r/t DKA, clinical status as evidenced


by A1C 10.4,  on adm, (+) acetone, now w/ critically elev Na(161*)


and low K(2.4*).





CURRENT DIET: [facility], [place] LOW puree + NTL     





PO DIET RECOMMENDATIONS:


[facility], [place] LOW diet/ texture per SLP  





ADDITIONAL RECOMMENDATIONS:


1) W/ variable po intake add Glucerna 1 tetra per day


    Add high pro/1 carb snacks in b/w meals   


2) On D5 for hydration, monitor BG  


3) Wound care: Add JONATHAN BID + Vit C 250mg daily (f/up w/ WC eval) 


4) Obtain a calibrated bed scale wts 


    EMR wt: 110#      vs        Bed scale wt: 135# 





(4) Abnormal thyroid blood test


(5) Electrolyte imbalance


Assessment & Plan:  1.  No pulmonary embolus, somewhat limited at the bases due 

to motion.


2.  Bibasilar lung atelectasis/airspace disease.


3.  3 mm nodule along the right middle lobe fissure. 3.6 mm nodule left 


lower lobe along the fissure.  Fleischner Society Guidelines for low-risk 


patients, no follow-up is necessary.  For high-risk patients (smoking 


history or other known risk factors) an optional chest CT at 12 months 


could be performed.


4.  Mild bilateral perinephric stranding.  May be senescent, correlate 


for infection.


5.  Distended gallbladder with hyperdensity may be sludge or stones.


6.  Prominent left lobe of the liver is slightly nodular in appearance.





US ordered 





(6) Subdural hematoma


Assessment & Plan:  as per neurology


MRI noted


bm biopsy pending 














Tomas Adair Apr 17, 2020 11:45

## 2020-04-17 NOTE — NEPHROLOGY PROGRESS NOTE
Assessment/Plan


Problem List:  


(1) Hypernatremia


Assessment:  Improving





(2) UTI (urinary tract infection)


(3) Subdural hematoma


(4) DKA (diabetic ketoacidoses)


(5) Electrolyte imbalance


Assessment





Hypernatremia most likely due to free water deficit


Hypokalemia, hypophosphatemia


Hyperglycemia and DKA


Evidence of UTI


Right-sided weakness with 3 mm subdural hematoma found in CT scan


Plan





Discontinue IV fluid


Discontinue Vincent catheter


Change Protonix to p.o.


Stop Neutra-Phos due to diarrhea





Potassium and phosphorus and magnesium supplement as needed


Diet started as the patient is more alert


Low dose of Lopressor


Monitor renal parameters and electrolytes


Neuro eval noted


Keep the blood pressure and blood sugar in check


Urine studies


Per orders





Subjective


ROS Limited/Unobtainable:  No


Constitutional:  Reports: malaise





Objective


Objective





Last 24 Hour Vital Signs








  Date Time  Temp Pulse Resp B/P (MAP) Pulse Ox O2 Delivery O2 Flow Rate FiO2


 


4/17/20 08:00 98.0 83 19 115/65 (82) 95   


 


4/17/20 04:00 98.4 91 19 110/61 (77) 98   


 


4/17/20 00:00 98.0 107 20 101/75 (84) 97   


 


4/16/20 21:00  103  109/60    


 


4/16/20 21:00      Room Air  


 


4/16/20 20:00 98.7 109 20 111/70 (84) 98   


 


4/16/20 16:00  95      


 


4/16/20 16:00 98.0 91 22 112/59 (76) 98   


 


4/16/20 12:00 98.1 91 22 121/93 (102) 98   


 


4/16/20 12:00  95      


 


4/16/20 10:44  99  107/63    

















Intake and Output  


 


 4/16/20 4/17/20





 19:00 07:00


 


Intake Total  50 ml


 


Balance  50 ml


 


  


 


IV Total  50 ml


 


# Voids  2











Current Medications








 Medications


  (Trade)  Dose


 Ordered  Sig/Meghan


 Route


 PRN Reason  Start Time


 Stop Time Status Last Admin


Dose Admin


 


 Acetaminophen


  (Tylenol)  650 mg  Q4H  PRN


 ORAL


 MILD PAIN  4/16/20 16:46


 5/16/20 16:45   


 


 


 Acetaminophen


  (Tylenol)  650 mg  Q4H  PRN


 ORAL


 Temp >100.5  4/16/20 16:46


 5/16/20 16:45   


 


 


 Ascorbic Acid


  (Vitamin C)  250 mg  TWICE A  DAY


 ORAL


   4/16/20 18:00


 5/15/20 17:59  4/16/20 17:45


 


 


 Atorvastatin


 Calcium


  (Lipitor)  10 mg  BEDTIME


 ORAL


   4/16/20 21:00


 7/10/20 20:59  4/16/20 21:17


 


 


 Ceftriaxone


 Sodium 1 gm/


 Dextrose  50 ml @ 


 100 mls/hr  Q24H


 IVPB


   4/16/20 21:00


 4/21/20 20:59  4/16/20 21:22


 


 


 Dexamethasone


 Sodium Phosphate


  (Decadron 4mg/ml


 vial)  4 mg  Q6HR


 IVP


   4/17/20 00:00


 7/16/20 00:00  4/17/20 06:04


 


 


 Dextrose


  (Dextrose 50%)  25 ml  Q30M  PRN


 IV


 Hypoglycemia  4/16/20 16:45


 7/9/20 21:44   


 


 


 Dextrose


  (Dextrose 50%)  50 ml  Q30M  PRN


 IV


 Hypoglycemia  4/16/20 16:45


 7/9/20 21:44   


 


 


 Gadobutrol


  (Gadavist)  7.5 mmol  NOW  PRN


 IV


 Radiology Procedure  4/16/20 22:00


 4/19/20 21:57   


 


 


 Insulin Aspart


  (NovoLOG)    AC+HS


 SUBQ


   4/16/20 21:00


 7/10/20 09:29  4/17/20 06:07


 


 


 Insulin Aspart


  (NovoLOG)  12 units  NOVOTIAC


 SUBQ


   4/16/20 16:50


 7/11/20 11:49  4/17/20 06:06


 


 


 Insulin Detemir


  (Levemir)  30 units  QHS


 SUBQ


   4/16/20 21:00


 7/11/20 20:59  4/16/20 20:57


 


 


 Levetiracetam


  (Keppra)  250 mg  Q12HR


 ORAL


   4/16/20 21:00


 5/29/20 20:59  4/16/20 21:17


 


 


 Lorazepam


  (Ativan)  1 mg  Q6H  PRN


 ORAL


 For Anxiety  4/17/20 08:30


 4/24/20 08:29   


 


 


 Metoprolol


 Tartrate


  (Lopressor)  25 mg  Q12HR


 ORAL


   4/16/20 21:00


 7/10/20 20:59   


 


 


 Pantoprazole


  (Protonix)  40 mg  BID


 ORAL


   4/16/20 18:00


 5/14/20 08:59  4/16/20 17:45


 


 


 Potassium Chloride


  (K-Dur)  40 meq  BID


 ORAL


   4/16/20 18:00


 7/12/20 09:14  4/16/20 17:44


 


 


 Tramadol HCl


  (Ultram)  25 mg  Q6H  PRN


 ORAL


 Severe Breakthru Pain (>7)  4/16/20 16:48


 4/23/20 16:47   


 


 


 Vancomycin HCl


  (Firvanq)  125 mg  FOUR TIMES A  DAY


 ORAL


   4/16/20 18:00


 4/26/20 17:59  4/16/20 21:22


 





Laboratory Tests


4/17/20 05:35: 


White Blood Count 7.0, Red Blood Count 3.65L, Hemoglobin 11.4L, Hematocrit 32.4L

, Mean Corpuscular Volume 89, Mean Corpuscular Hemoglobin 31.3H, Mean 

Corpuscular Hemoglobin Concent 35.2, Red Cell Distribution Width 11.3L, 

Platelet Count 82L, Mean Platelet Volume 8.2, Neutrophils (%) (Auto) , 

Lymphocytes (%) (Auto) , Monocytes (%) (Auto) , Eosinophils (%) (Auto) , 

Basophils (%) (Auto) , Neutrophils % (Manual) [Pending], Lymphocytes % (Manual) 

[Pending], Platelet Estimate [Pending], Platelet Morphology [Pending], Sodium 

Level 146H, Potassium Level 4.0, Chloride Level 110H, Carbon Dioxide Level 26, 

Anion Gap 10, Blood Urea Nitrogen 8, Creatinine 0.4L, Estimat Glomerular 

Filtration Rate > 60, Glucose Level 160#H, Calcium Level 8.1L, Total Protein (

PEP) [Pending], Albumin (PEP) [Pending], Globulin (PEP) [Pending], Albumin/

Globulin Ratio [Pending], Alpha-1-Globulins [Pending], Alpha-2-Globulins [

Pending], Beta Globulins [Pending], Beta Gamma Globulin [Pending], PEP Abnormal 

Protein Bands [Pending], Protein Electrophoresis Interpret [Pending], 

Carcinoembryonic Antigen [Pending], CA 19-9 Antigen [Pending]


Height (Feet):  5


Height (Inches):  4.00


Weight (Pounds):  135


General Appearance:  no apparent distress


Cardiovascular:  tachycardia


Respiratory/Chest:  decreased breath sounds


Abdomen:  soft


Objective


No change











Jonathan Jacobson MD Apr 17, 2020 09:33

## 2020-04-17 NOTE — NUR
NURSE NOTES:

Patient alert x4, Maced[place] speaking; on room air, no sing of distress and shortness of breath; 
no sing of chest pain; IV Left For-Arm and Left Upper arm flushes well; wound dressing dry 
and intact; SCD on; side rails padded for seizure percussion, side rails up x2, breaks 
engaged, bed at lowest position, call light within reach; will keep monitoring.

## 2020-04-17 NOTE — DIAGNOSTIC IMAGING REPORT
Indication: Shortness of breath

 

Technique: One view of the chest

 

Comparison: 4/15/2020

 

Findings: Less bronchial wall inspiration, with some crowding of bronchovascular

markings particularly at the lung bases. There is some atelectasis at the left lung

base. Reticular opacities at the right lung base are probably just an artifact of

poor inflation, but reticular infiltrate not completely excludable. The remainder the

lungs and pleural spaces are clear.

 

Impression: Hypoventilatory exam with bibasilar atelectasis left greater than right

 

Possible right basilar reticular infiltrate versus artifact of hyperinflation.

Correlate with clinical findings.

## 2020-04-18 VITALS — DIASTOLIC BLOOD PRESSURE: 68 MMHG | SYSTOLIC BLOOD PRESSURE: 109 MMHG

## 2020-04-18 VITALS — DIASTOLIC BLOOD PRESSURE: 69 MMHG | SYSTOLIC BLOOD PRESSURE: 101 MMHG

## 2020-04-18 VITALS — DIASTOLIC BLOOD PRESSURE: 75 MMHG | SYSTOLIC BLOOD PRESSURE: 127 MMHG

## 2020-04-18 VITALS — SYSTOLIC BLOOD PRESSURE: 113 MMHG | DIASTOLIC BLOOD PRESSURE: 66 MMHG

## 2020-04-18 VITALS — SYSTOLIC BLOOD PRESSURE: 107 MMHG | DIASTOLIC BLOOD PRESSURE: 64 MMHG

## 2020-04-18 VITALS — SYSTOLIC BLOOD PRESSURE: 120 MMHG | DIASTOLIC BLOOD PRESSURE: 70 MMHG

## 2020-04-18 LAB
ADD MANUAL DIFF: YES
ANION GAP SERPL CALC-SCNC: 6 MMOL/L (ref 5–15)
BUN SERPL-MCNC: 16 MG/DL (ref 7–18)
CALCIUM SERPL-MCNC: 9 MG/DL (ref 8.5–10.1)
CHLORIDE SERPL-SCNC: 107 MMOL/L (ref 98–107)
CO2 SERPL-SCNC: 30 MMOL/L (ref 21–32)
CREAT SERPL-MCNC: 0.5 MG/DL (ref 0.55–1.3)
ERYTHROCYTE [DISTWIDTH] IN BLOOD BY AUTOMATED COUNT: 11.4 % (ref 11.6–14.8)
HCT VFR BLD CALC: 34.1 % (ref 37–47)
HGB BLD-MCNC: 11.9 G/DL (ref 12–16)
MCV RBC AUTO: 89 FL (ref 80–99)
PLATELET # BLD: 119 K/UL (ref 150–450)
POTASSIUM SERPL-SCNC: 4.1 MMOL/L (ref 3.5–5.1)
RBC # BLD AUTO: 3.82 M/UL (ref 4.2–5.4)
SODIUM SERPL-SCNC: 143 MMOL/L (ref 136–145)
WBC # BLD AUTO: 6.9 K/UL (ref 4.8–10.8)

## 2020-04-18 RX ADMIN — SODIUM CHLORIDE SCH MLS/HR: 0.9 INJECTION INTRAVENOUS at 21:17

## 2020-04-18 RX ADMIN — DEXAMETHASONE SODIUM PHOSPHATE SCH MG: 4 INJECTION, SOLUTION INTRA-ARTICULAR; INTRALESIONAL; INTRAMUSCULAR; INTRAVENOUS; SOFT TISSUE at 00:38

## 2020-04-18 RX ADMIN — INSULIN ASPART SCH UNITS: 100 INJECTION, SOLUTION INTRAVENOUS; SUBCUTANEOUS at 06:34

## 2020-04-18 RX ADMIN — INSULIN ASPART SCH UNITS: 100 INJECTION, SOLUTION INTRAVENOUS; SUBCUTANEOUS at 17:39

## 2020-04-18 RX ADMIN — DEXAMETHASONE SODIUM PHOSPHATE SCH MG: 4 INJECTION, SOLUTION INTRA-ARTICULAR; INTRALESIONAL; INTRAMUSCULAR; INTRAVENOUS; SOFT TISSUE at 12:42

## 2020-04-18 RX ADMIN — VANCOMYCIN HYDROCHLORIDE SCH MG: 500 INJECTION, POWDER, LYOPHILIZED, FOR SOLUTION INTRAVENOUS at 12:42

## 2020-04-18 RX ADMIN — VANCOMYCIN HYDROCHLORIDE SCH MG: 500 INJECTION, POWDER, LYOPHILIZED, FOR SOLUTION INTRAVENOUS at 17:24

## 2020-04-18 RX ADMIN — Medication SCH MG: at 17:24

## 2020-04-18 RX ADMIN — DEXAMETHASONE SODIUM PHOSPHATE SCH MG: 4 INJECTION, SOLUTION INTRA-ARTICULAR; INTRALESIONAL; INTRAMUSCULAR; INTRAVENOUS; SOFT TISSUE at 17:25

## 2020-04-18 RX ADMIN — Medication SCH MG: at 08:49

## 2020-04-18 RX ADMIN — VANCOMYCIN HYDROCHLORIDE SCH MG: 500 INJECTION, POWDER, LYOPHILIZED, FOR SOLUTION INTRAVENOUS at 08:49

## 2020-04-18 RX ADMIN — INSULIN ASPART SCH UNITS: 100 INJECTION, SOLUTION INTRAVENOUS; SUBCUTANEOUS at 13:26

## 2020-04-18 RX ADMIN — INSULIN DETEMIR SCH UNITS: 100 INJECTION, SOLUTION SUBCUTANEOUS at 21:18

## 2020-04-18 RX ADMIN — INSULIN ASPART SCH UNITS: 100 INJECTION, SOLUTION INTRAVENOUS; SUBCUTANEOUS at 21:18

## 2020-04-18 RX ADMIN — INSULIN ASPART SCH UNITS: 100 INJECTION, SOLUTION INTRAVENOUS; SUBCUTANEOUS at 12:42

## 2020-04-18 RX ADMIN — VANCOMYCIN HYDROCHLORIDE SCH MG: 500 INJECTION, POWDER, LYOPHILIZED, FOR SOLUTION INTRAVENOUS at 21:17

## 2020-04-18 RX ADMIN — DEXAMETHASONE SODIUM PHOSPHATE SCH MG: 4 INJECTION, SOLUTION INTRA-ARTICULAR; INTRALESIONAL; INTRAMUSCULAR; INTRAVENOUS; SOFT TISSUE at 06:33

## 2020-04-18 RX ADMIN — INSULIN ASPART SCH UNITS: 100 INJECTION, SOLUTION INTRAVENOUS; SUBCUTANEOUS at 06:35

## 2020-04-18 NOTE — GENERAL PROGRESS NOTE
Assessment/Plan


Assessment/Plan:


65-year-old female with PMH of medical noncompliance, DM type II who presents 

with b/l weakness, N/V, GLF, on admission pt found to be in DKA with 's.





#DKA


#E. coli UTI


#E. coli bacteremia


#Uncontrolled DM, Hgb A1C 10.4


#c. diff colitis


-continue in-patient medical care


-s/p DKA protocol with improved AG, AG 21-> 14


-CT abd reviewed


-4/10: UCx e. coli


-4/10: BCx w/E. coli 2/2, repeat 4/12 BCx NGTD


-c.diff positive, contact precautions


-cont. accuchecks, ISS qAC/HS


-Endo following: Levemir to 30 units qhs, increase Novolog to 12 units ac tid


-ID, Dr. Looney: CTX, day #6 of abx, PO vanco for c diff





#Multilevel Thoracic BM enhancements, concern for malignancy


-suspicions for metastatic neoplasm, infiltrative neoplasm such as myeloma or 

lymphoma


-4/10: CT abd/pel w/o contrast reviewed with no abnormalities


-4/17 CT C/A/P without any lesions suspicious for primary malignancy


-CEA elevated


- pending


-Follow up BM biopsy results


-Heme/Onc following


-d/w Pt's sister, Carin Wheeler [phone], updated her on pt's hospital 

course, POC, Pt has apparently been having loose stools/diarrhea for severel 

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 Sister stated pt has lost weight but has been 

suspecting "stomach CA", pt with no previous h/o of Ulises. Answered all questions 

to pt and pt's sister's satisfaction





#Right sided weakness


#Generalized Weakness


#SDH 3mm no midline shift


-weakness may be multifactorial, metabolic


-pt AOx3, pt noted fall 3 days ago


-found on CT head, repeat CT head stable


-4/14: CT head obtained given worsening weakness, stable compared to previous


-d/w Neuro, no need for transfer at this time, will decrease keppra


-cont. fall precautions


-holding ASA given SDH


-statin


-PT treat and eval


-d/w Neurology, Dr. Cheney, weakness does not appear to be neurologic at this 

time


-CM for d/c planning, plan for CRI on d/c








#Elevated d-dimer


#Tachycardia - resolved


-likely multifactorial given infection/DKA, PE has been ruled out


-EKG w/NSR


-CTA thorax negative for PE


-tachycardia likely 2/2 dehydration, encourage PO intake


-no OAC at this time





#Hypernatremia - resolved


#Hypokalemia - improved


#Hypophosphatemia


-Hypernatremia likely 2/2 free water deficit


-replace electrolytes, cont. to monitor and replace PRN


-bruner d/c'd


-Nephro following, IVF and electrolytes per nephro





DVT - SCDs for now given SDH





Time spent: 35 minutes on this patient's case, d/w pt POC, updated RN. D/w 

consulting MDs, RN, case management





Time of note may not reflect time of encounter.





Subjective


Date patient seen:  Apr 18, 2020


Time patient seen:  14:22


ROS Limited/Unobtainable:  Yes


Constitutional:  Denies: chills, fever


Cardiovascular:  Denies: chest pain, edema


Respiratory:  Denies: cough, orthopnea


Gastrointestinal/Abdominal:  Reports: diarrhea; Denies: abdomen distended, 

abdominal pain, nausea, poor appetite


Allergies:  


Coded Allergies:  


     No Known Allergies (Unverified , 4/15/15)


Subjective


Follow up for uncontrolled DM, DKA, UTI, C. difficile colitis.


No acute events overnight


She reports persistent severe diarrhea





Objective





Last 24 Hour Vital Signs








  Date Time  Temp Pulse Resp B/P (MAP) Pulse Ox O2 Delivery O2 Flow Rate FiO2


 


4/18/20 12:00 97.2 80 18 107/64 (78) 96   


 


4/18/20 09:00      Room Air  


 


4/18/20 08:49  83  113/66    


 


4/18/20 08:00 97.0 83 18 113/66 (82) 98   


 


4/18/20 04:00 98.7 83 18 120/70 (87) 97   


 


4/18/20 00:00 98.0 95 19 101/69 (80) 95   


 


4/17/20 21:00      Room Air  


 


4/17/20 20:59  90  114/67    


 


4/17/20 20:00 98.5 90 19 114/67 (83) 97   


 


4/17/20 16:00 97.7 89 17 118/69 (85) 95   

















Intake and Output  


 


 4/17/20 4/18/20





 19:00 07:00


 


Intake Total 600 ml 


 


Balance 600 ml 


 


  


 


Other 600 ml 


 


# Voids  6


 


# Bowel Movements 2 5








Laboratory Tests


4/18/20 05:25: 


White Blood Count 6.9, Red Blood Count 3.82L, Hemoglobin 11.9L, Hematocrit 34.1L

, Mean Corpuscular Volume 89, Mean Corpuscular Hemoglobin 31.0, Mean 

Corpuscular Hemoglobin Concent 34.8, Red Cell Distribution Width 11.4L, 

Platelet Count 119L, Mean Platelet Volume 7.8, Neutrophils (%) (Auto) , 

Lymphocytes (%) (Auto) , Monocytes (%) (Auto) , Eosinophils (%) (Auto) , 

Basophils (%) (Auto) , Differential Total Cells Counted 100, Neutrophils % (

Manual) 89H, Lymphocytes % (Manual) 10L, Monocytes % (Manual) 1, Eosinophils % (

Manual) 0, Basophils % (Manual) 0, Band Neutrophils 0, Platelet Estimate 

DecreasedL, Platelet Morphology Normal, Red Blood Cell Morphology Normal, 

Sodium Level 143, Potassium Level 4.1, Chloride Level 107, Carbon Dioxide Level 

30, Anion Gap 6, Blood Urea Nitrogen 16, Creatinine 0.5L, Estimat Glomerular 

Filtration Rate > 60, Glucose Level 178H, Calcium Level 9.0


Height (Feet):  5


Height (Inches):  4.00


Weight (Pounds):  135


General Appearance:  alert


Neck:  normal alignment, supple


Cardiovascular:  normal rate, regular rhythm


Respiratory/Chest:  lungs clear, normal breath sounds


Abdomen:  non tender, soft











Varun Evans MD Apr 18, 2020 14:30

## 2020-04-18 NOTE — NUR
NURSE NOTES:

Patient received in bed, awake, oriented x3. No complaints at this time. IV is intact and 
patent. Call light in reach. Will continue to monitor.

## 2020-04-18 NOTE — INFECTIOUS DISEASES PROG NOTE
Assessment/Plan


Assessment/Plan





ASSESSMENT AND PLAN:





1. e.coli uti/pyelonephritis with bacteremia, sepsis, fevers, gram neg sepsis 


    c.diff. +, diarrhea 





   - ceftriaxone - day # 8 abx, plan on 10 days treatment


   - po vancomycin - day # 5, plan on 14 days treatment (this would include 7 

days treatment post use of concomitant antibiotics) 


   - surveillance blood cultures negative 


   - monitor labs and temperatures 


   - sepsis improved 





2. DKA.


3. Hypernatremia.


4. Diabetes type 2.


5. Ground-level fall.


6. Nausea and vomiting.


7. Diarrhea.


8. We will check stool studies.


9. Noncompliance.


10. Right-sided weakness.


11. Subdural hematoma with midline shift.


12. No known drug allergies.


13. Social history negative.


14. Family history noncontributory.


15. MAR was noted.


16. Case discussed with RN.


17. Continue treatment per primary consultants.


18. Orders were noted and entered.





Subjective


Constitutional:  Denies: fever


Breasts:  Denies: discharge


Cardiovascular:  Denies: chest pain


Gastrointestinal/Abdominal:  Reports: other - + diarrhea per d/w RN; Denies: 

nausea, vomiting


Genitourinary:  Denies: other


Neurologic:  Denies: headache


Psychiatric:  Denies: depression


Skin:  Denies: rash


Hematologic:  Denies: bleeding


Musculoskeletal:  Denies: pain


Allergies:  


Coded Allergies:  


     No Known Allergies (Unverified , 4/15/15)





Objective


Vital Signs





Last 24 Hour Vital Signs








  Date Time  Temp Pulse Resp B/P (MAP) Pulse Ox O2 Delivery O2 Flow Rate FiO2


 


4/18/20 12:00 97.2 80 18 107/64 (78) 96   


 


4/18/20 09:00      Room Air  


 


4/18/20 08:49  83  113/66    


 


4/18/20 08:00 97.0 83 18 113/66 (82) 98   


 


4/18/20 04:00 98.7 83 18 120/70 (87) 97   


 


4/18/20 00:00 98.0 95 19 101/69 (80) 95   


 


4/17/20 21:00      Room Air  


 


4/17/20 20:59  90  114/67    


 


4/17/20 20:00 98.5 90 19 114/67 (83) 97   


 


4/17/20 16:00 97.7 89 17 118/69 (85) 95   








Height (Feet):  5


Height (Inches):  4.00


Weight (Pounds):  135


General Appearance:  no acute distress


HEENT:  normocephalic, atraumatic, anicteric, mucous membranes moist


Respiratory/Chest:  lungs clear, normal breath sounds, no respiratory distress, 

no accessory muscle use


Cardiovascular:  normal rate, regular rhythm, no gallop/murmur, no JVD


Abdomen:  normal bowel sounds, soft, non tender, no organomegaly, non distended


Genitourinary:  other - no bruner


Extremities:  no cyanosis


Skin:  no rash


Neurologic/Psychiatric:  CNs II-XII grossly normal, alert, responsive


Lymphatic:  no neck adenopathy


Musculoskeletal:  no effusion


Objective





CT chest - 





IMPRESSION:     


1.  No pulmonary embolus, somewhat limited at the bases due to motion.


2.  Bibasilar lung atelectasis/airspace disease.


3.  3 mm nodule along the right middle lobe fissure. 3.6 mm nodule left 


lower lobe along the fissure.  Fleischner Society Guidelines for low-risk 


patients, no follow-up is necessary.  For high-risk patients (smoking 


history or other known risk factors) an optional chest CT at 12 months 


could be performed.


4.  Mild bilateral perinephric stranding.  May be senescent, correlate 


for infection.


5.  Distended gallbladder with hyperdensity may be sludge or stones.


6.  Prominent left lobe of the liver is slightly nodular in appearance.





 





CT abdomen and pelvis:





IMPRESSION:     


1.  Urinary bladder distention.


2.  Mild heterogeneity of the lower pole of the left kidney may be 


artifactual.  Infection is not excluded.  Consider correlation with 


urinalysis, as clinically indicated.  Mild bilateral renal pelviectasis 


and prominence of bilateral ureters is likely related to distention of 


the urinary bladder.





Microbiology








 Date/Time


Source Procedure


Growth Status


 


 


 4/12/20 15:15


Blood Blood Culture - Final


NO GROWTH AFTER 5 DAYS Complete


 


 4/14/20 18:50


Stool Clostridium difficile Toxin Assay - Final Complete


 


 4/10/20 20:40


Urine,Clean Catch Urine Culture - Final


Escherichia Coli Complete











Laboratory Tests








Test


  4/18/20


05:25


 


White Blood Count


  6.9 K/UL


(4.8-10.8)


 


Red Blood Count


  3.82 M/UL


(4.20-5.40)  L


 


Hemoglobin


  11.9 G/DL


(12.0-16.0)  L


 


Hematocrit


  34.1 %


(37.0-47.0)  L


 


Mean Corpuscular Volume 89 FL (80-99)  


 


Mean Corpuscular Hemoglobin


  31.0 PG


(27.0-31.0)


 


Mean Corpuscular Hemoglobin


Concent 34.8 G/DL


(32.0-36.0)


 


Red Cell Distribution Width


  11.4 %


(11.6-14.8)  L


 


Platelet Count


  119 K/UL


(150-450)  L


 


Mean Platelet Volume


  7.8 FL


(6.5-10.1)


 


Neutrophils (%) (Auto)


  % (45.0-75.0)


 


 


Lymphocytes (%) (Auto)


  % (20.0-45.0)


 


 


Monocytes (%) (Auto)  % (1.0-10.0)  


 


Eosinophils (%) (Auto)  % (0.0-3.0)  


 


Basophils (%) (Auto)  % (0.0-2.0)  


 


Differential Total Cells


Counted 100  


 


 


Neutrophils % (Manual) 89 % (45-75)  H


 


Lymphocytes % (Manual) 10 % (20-45)  L


 


Monocytes % (Manual) 1 % (1-10)  


 


Eosinophils % (Manual) 0 % (0-3)  


 


Basophils % (Manual) 0 % (0-2)  


 


Band Neutrophils 0 % (0-8)  


 


Platelet Estimate Decreased  L


 


Platelet Morphology Normal  


 


Red Blood Cell Morphology Normal  


 


Sodium Level


  143 MMOL/L


(136-145)


 


Potassium Level


  4.1 MMOL/L


(3.5-5.1)


 


Chloride Level


  107 MMOL/L


()


 


Carbon Dioxide Level


  30 MMOL/L


(21-32)


 


Anion Gap


  6 mmol/L


(5-15)


 


Blood Urea Nitrogen


  16 mg/dL


(7-18)


 


Creatinine


  0.5 MG/DL


(0.55-1.30)  L


 


Estimat Glomerular Filtration


Rate > 60 mL/min


(>60)


 


Glucose Level


  178 MG/DL


()  H


 


Calcium Level


  9.0 MG/DL


(8.5-10.1)











Current Medications








 Medications


  (Trade)  Dose


 Ordered  Sig/Meghan


 Route


 PRN Reason  Start Time


 Stop Time Status Last Admin


Dose Admin


 


 Acetaminophen


  (Tylenol)  650 mg  Q6H  PRN


 ORAL


 Mild Pain (Pain Scale 1-3)  4/17/20 10:00


 5/17/20 09:59   


 


 


 Ascorbic Acid


  (Vitamin C)  250 mg  TWICE A  DAY


 ORAL


   4/16/20 18:00


 5/15/20 17:59  4/18/20 08:49


 


 


 Atorvastatin


 Calcium


  (Lipitor)  10 mg  BEDTIME


 ORAL


   4/16/20 21:00


 7/10/20 20:59  4/17/20 20:59


 


 


 Barium Sulfate


  (Readi-Cat 2)  450 ml  NOW  PRN


 ORAL


 Radiology Procedure  4/17/20 11:30


 4/19/20 11:29   


 


 


 Ceftriaxone


 Sodium 1 gm/


 Dextrose  50 ml @ 


 100 mls/hr  Q24H


 IVPB


   4/16/20 21:00


 4/21/20 20:59  4/17/20 20:58


 


 


 Dexamethasone


 Sodium Phosphate


  (Decadron 4mg/ml


 vial)  4 mg  Q6HR


 IVP


   4/17/20 00:00


 7/16/20 00:00  4/18/20 12:42


 


 


 Dextrose


  (Dextrose 50%)  25 ml  Q30M  PRN


 IV


 Hypoglycemia  4/16/20 16:45


 7/9/20 21:44   


 


 


 Dextrose


  (Dextrose 50%)  50 ml  Q30M  PRN


 IV


 Hypoglycemia  4/16/20 16:45


 7/9/20 21:44   


 


 


 Gadobutrol


  (Gadavist)  7.5 mmol  NOW  PRN


 IV


 Radiology Procedure  4/16/20 22:00


 4/19/20 21:57   


 


 


 Insulin Aspart


  (NovoLOG)    AC+HS


 SUBQ


   4/16/20 21:00


 7/10/20 09:29  4/18/20 13:26


 


 


 Insulin Aspart


  (NovoLOG)  12 units  NOVOTIAC


 SUBQ


   4/16/20 16:50


 7/11/20 11:49  4/18/20 13:26


 


 


 Insulin Detemir


  (Levemir)  30 units  QHS


 SUBQ


   4/16/20 21:00


 7/11/20 20:59  4/17/20 21:12


 


 


 Iohexol


  (OMNIPAQUE-300


 100ml)  100 ml  ONCE  PRN


 INJ


 radiology procedure  4/17/20 11:30


 4/19/20 11:29   


 


 


 Levetiracetam


  (Keppra)  250 mg  Q12HR


 ORAL


   4/16/20 21:00


 5/29/20 20:59  4/18/20 08:49


 


 


 Lorazepam


  (Ativan)  1 mg  Q6H  PRN


 ORAL


 For Anxiety  4/17/20 08:30


 4/24/20 08:29   


 


 


 Metoprolol


 Tartrate


  (Lopressor)  25 mg  Q12HR


 ORAL


   4/16/20 21:00


 7/10/20 20:59  4/18/20 08:49


 


 


 Pantoprazole


  (Protonix)  40 mg  BID


 ORAL


   4/16/20 18:00


 5/14/20 08:59  4/18/20 08:49


 


 


 Potassium Chloride


  (K-Dur)  40 meq  BID


 ORAL


   4/16/20 18:00


 7/12/20 09:14  4/18/20 08:50


 


 


 Tramadol HCl


  (Ultram)  25 mg  Q6H  PRN


 ORAL


 Severe Breakthru Pain (>7)  4/16/20 16:48


 4/23/20 16:47   


 


 


 Vancomycin HCl


  (Firvanq)  125 mg  FOUR TIMES A  DAY


 ORAL


   4/16/20 18:00


 4/26/20 17:59  4/18/20 12:42


 

















Brijesh Looney MD Apr 18, 2020 15:31

## 2020-04-18 NOTE — NUR
NURSE NOTES:

PT AXOX3, FORGETFUL AT TIMES. PT STATESN SHE HAS WEAKNESS, LEFT SIDE WEAKER THAN RIGHT. PT 
HAD 1 EPISODE OF DIARRHEA. PT WAS CLEANED AND REPOSITIONED. PT HAS HER CELLPHONE AND A BLANK 
CHECK AT BEDSIDE. PT PLACED IN SEMI-MARCANO'S POSITION WITH HOB ELEVATED. BED IN LOWEST 
POSITION WITH BEDSIDE RAILS X3 RAISED. SIDERAILS PADDED FOR SEIZURE PRECAUTIONS. IN NO 
APPARENT DISTRESS AT THIS TIME. PT ABLE TO COMMUNICATE WITH RN IN Luxem[place]ish. PT SPEAKS [place] 
AND Portuguese. CALL LIGHT WITHIN REACH. WILL CONTINUE TO MONITOR.

## 2020-04-18 NOTE — NEUROLOGY PROGRESS NOTE
Interim History


Interim History


ROS Limited/Unobtainable:  Yes


Interim History


myeloma work up ongoing


still with diarrhea





Objective


Physical Exam





Last Vital Signs








  Date Time  Temp Pulse Resp B/P (MAP) Pulse Ox O2 Delivery O2 Flow Rate FiO2


 


4/18/20 21:17  99  127/75    


 


4/18/20 16:00 97.7  18  97   


 


4/18/20 09:00      Room Air  











Laboratory Tests








Test


  4/18/20


05:25


 


White Blood Count


  6.9 K/UL


(4.8-10.8)


 


Red Blood Count


  3.82 M/UL


(4.20-5.40)  L


 


Hemoglobin


  11.9 G/DL


(12.0-16.0)  L


 


Hematocrit


  34.1 %


(37.0-47.0)  L


 


Mean Corpuscular Volume 89 FL (80-99)  


 


Mean Corpuscular Hemoglobin


  31.0 PG


(27.0-31.0)


 


Mean Corpuscular Hemoglobin


Concent 34.8 G/DL


(32.0-36.0)


 


Red Cell Distribution Width


  11.4 %


(11.6-14.8)  L


 


Platelet Count


  119 K/UL


(150-450)  L


 


Mean Platelet Volume


  7.8 FL


(6.5-10.1)


 


Neutrophils (%) (Auto)


  % (45.0-75.0)


 


 


Lymphocytes (%) (Auto)


  % (20.0-45.0)


 


 


Monocytes (%) (Auto)  % (1.0-10.0)  


 


Eosinophils (%) (Auto)  % (0.0-3.0)  


 


Basophils (%) (Auto)  % (0.0-2.0)  


 


Differential Total Cells


Counted 100  


 


 


Neutrophils % (Manual) 89 % (45-75)  H


 


Lymphocytes % (Manual) 10 % (20-45)  L


 


Monocytes % (Manual) 1 % (1-10)  


 


Eosinophils % (Manual) 0 % (0-3)  


 


Basophils % (Manual) 0 % (0-2)  


 


Band Neutrophils 0 % (0-8)  


 


Platelet Estimate Decreased  L


 


Platelet Morphology Normal  


 


Red Blood Cell Morphology Normal  


 


Sodium Level


  143 MMOL/L


(136-145)


 


Potassium Level


  4.1 MMOL/L


(3.5-5.1)


 


Chloride Level


  107 MMOL/L


()


 


Carbon Dioxide Level


  30 MMOL/L


(21-32)


 


Anion Gap


  6 mmol/L


(5-15)


 


Blood Urea Nitrogen


  16 mg/dL


(7-18)


 


Creatinine


  0.5 MG/DL


(0.55-1.30)  L


 


Estimat Glomerular Filtration


Rate > 60 mL/min


(>60)


 


Glucose Level


  178 MG/DL


()  H


 


Calcium Level


  9.0 MG/DL


(8.5-10.1)








Head:  normocophalic


Neck:  no rigidity


EENT:  benign





Neurologic Exam


Mental Status:  awake


Speech:  normal speech


Language:  normal language


Cranial Nerve VII:  no facial asymmetry


Objective


alert, oriented x 2, conversant


UEs 5/5


LEs 1/5 flaccid 


cc 35 min





Impression/Recommendations


Problems:  


(1) STEMI (ST elevation myocardial infarction)


(2) Atrial fibrillation with rapid ventricular response


(3) Prolonged Q-T interval on ECG


(4) DKA (diabetic ketoacidoses)


(5) Hypernatremia


(6) UTI (urinary tract infection)


(7) Subdural hematoma


Diagnostic Impression





Given LE weakness , suspect myelopathy, mri cervical thoracic and lumbar ordered

- called radiology to expedite, imaging still not in system to review





 keppra to 250 mg bid


SBP < 150


PT OT


consider Osmin Chao MD Apr 18, 2020 21:26

## 2020-04-18 NOTE — SURGERY PROGRESS NOTE
Surgery Progress Note


Subjective


Additional Comments


no acute events


comfortable


stable 


pending bx





Objective





Last 24 Hour Vital Signs








  Date Time  Temp Pulse Resp B/P (MAP) Pulse Ox O2 Delivery O2 Flow Rate FiO2


 


4/18/20 12:00 97.2 80 18 107/64 (78) 96   


 


4/18/20 09:00      Room Air  


 


4/18/20 08:49  83  113/66    


 


4/18/20 08:00 97.0 83 18 113/66 (82) 98   


 


4/18/20 04:00 98.7 83 18 120/70 (87) 97   


 


4/18/20 00:00 98.0 95 19 101/69 (80) 95   


 


4/17/20 21:00      Room Air  


 


4/17/20 20:59  90  114/67    


 


4/17/20 20:00 98.5 90 19 114/67 (83) 97   


 


4/17/20 16:00 97.7 89 17 118/69 (85) 95   








I&O











Intake and Output  


 


 4/17/20 4/18/20





 19:00 07:00


 


Intake Total 600 ml 


 


Balance 600 ml 


 


  


 


Other 600 ml 


 


# Voids  6


 


# Bowel Movements 2 5








Dressing:  other


Wound:  other


Drains:  other


Cardiovascular:  RSR


Respiratory:  decreased breath sounds


Abdomen:  soft, non-tender, present bowel sounds


Extremities:  edema, tenderness - around biposy site left buttock , no 

tenderness, no cyanosis





Laboratory Tests








Test


  4/18/20


05:25


 


White Blood Count


  6.9 K/UL


(4.8-10.8)


 


Red Blood Count


  3.82 M/UL


(4.20-5.40)  L


 


Hemoglobin


  11.9 G/DL


(12.0-16.0)  L


 


Hematocrit


  34.1 %


(37.0-47.0)  L


 


Mean Corpuscular Volume 89 FL (80-99)  


 


Mean Corpuscular Hemoglobin


  31.0 PG


(27.0-31.0)


 


Mean Corpuscular Hemoglobin


Concent 34.8 G/DL


(32.0-36.0)


 


Red Cell Distribution Width


  11.4 %


(11.6-14.8)  L


 


Platelet Count


  119 K/UL


(150-450)  L


 


Mean Platelet Volume


  7.8 FL


(6.5-10.1)


 


Neutrophils (%) (Auto)


  % (45.0-75.0)


 


 


Lymphocytes (%) (Auto)


  % (20.0-45.0)


 


 


Monocytes (%) (Auto)  % (1.0-10.0)  


 


Eosinophils (%) (Auto)  % (0.0-3.0)  


 


Basophils (%) (Auto)  % (0.0-2.0)  


 


Differential Total Cells


Counted 100  


 


 


Neutrophils % (Manual) 89 % (45-75)  H


 


Lymphocytes % (Manual) 10 % (20-45)  L


 


Monocytes % (Manual) 1 % (1-10)  


 


Eosinophils % (Manual) 0 % (0-3)  


 


Basophils % (Manual) 0 % (0-2)  


 


Band Neutrophils 0 % (0-8)  


 


Platelet Estimate Decreased  L


 


Platelet Morphology Normal  


 


Red Blood Cell Morphology Normal  


 


Sodium Level


  143 MMOL/L


(136-145)


 


Potassium Level


  4.1 MMOL/L


(3.5-5.1)


 


Chloride Level


  107 MMOL/L


()


 


Carbon Dioxide Level


  30 MMOL/L


(21-32)


 


Anion Gap


  6 mmol/L


(5-15)


 


Blood Urea Nitrogen


  16 mg/dL


(7-18)


 


Creatinine


  0.5 MG/DL


(0.55-1.30)  L


 


Estimat Glomerular Filtration


Rate > 60 mL/min


(>60)


 


Glucose Level


  178 MG/DL


()  H


 


Calcium Level


  9.0 MG/DL


(8.5-10.1)











Plan


Problems:  


(1) Decubitus skin ulcer


Assessment & Plan:  Patient noted to have rapid onset Sacral DTPI. Per Primary 

Nurse pt noted on prior shift to have non-blanching erythema as per report.  

All preventive measures were taken to reposition patient and off-load pressure.

  Despite repositioning and applying Moisture Barrier Paste patient noted to 

have further skin decline. 


Sacral DTPI is irregular shaped, Purple and indurated in colour over 

sacrococcygeal with surrounding non-blanching erythema along borders.(L)7.1cm x 

(W)6.5cm. 


Nutritional intake varies 30-45% daily. 


Non-Blanching erythema without fluctuance noted to R and L heels.


Both heels observed floated with pillows Off mattress.





Tx.Plan: 


Apply Moisture Barrier Paste to Sacrum. Cover with Optifoam drsg. Change every 

3 days and prn.


            


Apply Cavilon Skin Barrier to both heels. Cover each heel with Optifoam drsg. 

Change every 7 days and prn.


            


Reposition at least every 2hours or as tolerated.


            


Off-load heels with pillow.


            


APM/VIK Mattress overlay.





MRI spine noted


neuro input appreciated 





(2) Deep tissue injury


(3) Malnutrition


Assessment & Plan:  DAILY ESTIMATED NEEDS:


Needs based on DM 61.5kg  


25-30  kcals/kg 


8133-0870  total kcals


1.25-1.5  g protein/kg


77-92  g total protein 


25-30  mL/kg


9592-7129  total fluid mLs





NUTRITION DIAGNOSIS:


Altered nutrition related lab values r/t DKA, clinical status as evidenced


by A1C 10.4,  on adm, (+) acetone, now w/ critically elev Na(161*)


and low K(2.4*).





CURRENT DIET: CCHO LOW puree + NTL     





PO DIET RECOMMENDATIONS:


CCHO LOW diet/ texture per SLP  





ADDITIONAL RECOMMENDATIONS:


1) W/ variable po intake add Glucerna 1 tetra per day


    Add high pro/1 carb snacks in b/w meals   


2) On D5 for hydration, monitor BG  


3) Wound care: Add JONATHAN BID + Vit C 250mg daily (f/up w/ WC eval) 


4) Obtain a calibrated bed scale wts 


    EMR wt: 110#      vs        Bed scale wt: 135# 





(4) Abnormal thyroid blood test


(5) Electrolyte imbalance


Assessment & Plan:  1.  No pulmonary embolus, somewhat limited at the bases due 

to motion.


2.  Bibasilar lung atelectasis/airspace disease.


3.  3 mm nodule along the right middle lobe fissure. 3.6 mm nodule left 


lower lobe along the fissure.  Fleischner Society Guidelines for low-risk 


patients, no follow-up is necessary.  For high-risk patients (smoking 


history or other known risk factors) an optional chest CT at 12 months 


could be performed.


4.  Mild bilateral perinephric stranding.  May be senescent, correlate 


for infection.


5.  Distended gallbladder with hyperdensity may be sludge or stones.


6.  Prominent left lobe of the liver is slightly nodular in appearance.





US ordered 





(6) Subdural hematoma


Assessment & Plan:  as per neurology


MRI noted


bm biopsy pending 














Tomas Adair Apr 18, 2020 15:00

## 2020-04-18 NOTE — NEPHROLOGY PROGRESS NOTE
Assessment/Plan


Problem List:  


(1) Hypernatremia


Assessment:  Improving





(2) UTI (urinary tract infection)


(3) Subdural hematoma


(4) DKA (diabetic ketoacidoses)


(5) Electrolyte imbalance


Assessment





Hypernatremia most likely due to free water deficit


Hypokalemia, hypophosphatemia


Hyperglycemia and DKA


Evidence of UTI


Right-sided weakness with 3 mm subdural hematoma found in CT scan


Plan





Discontinue IV fluid


Discontinue Vincent catheter


Change Protonix to p.o.


Stop Neutra-Phos due to diarrhea





Potassium and phosphorus and magnesium supplement as needed


Diet started as the patient is more alert


Low dose of Lopressor


Monitor renal parameters and electrolytes


Neuro eval noted


Keep the blood pressure and blood sugar in check


Urine studies


Per orders





Subjective


ROS Limited/Unobtainable:  No


Constitutional:  Reports: malaise





Objective


Objective





Last 24 Hour Vital Signs








  Date Time  Temp Pulse Resp B/P (MAP) Pulse Ox O2 Delivery O2 Flow Rate FiO2


 


4/18/20 09:00      Room Air  


 


4/18/20 08:49  83  113/66    


 


4/18/20 08:00 97.0 83 18 113/66 (82) 98   


 


4/18/20 04:00 98.7 83 18 120/70 (87) 97   


 


4/18/20 00:00 98.0 95 19 101/69 (80) 95   


 


4/17/20 21:00      Room Air  


 


4/17/20 20:59  90  114/67    


 


4/17/20 20:00 98.5 90 19 114/67 (83) 97   


 


4/17/20 16:00 97.7 89 17 118/69 (85) 95   


 


4/17/20 12:00 98.0 94 18 118/64 (82) 94   

















Intake and Output  


 


 4/17/20 4/18/20





 19:00 07:00


 


Intake Total 600 ml 


 


Balance 600 ml 


 


  


 


Other 600 ml 


 


# Voids  6


 


# Bowel Movements 2 5








Laboratory Tests


4/18/20 05:25: 


White Blood Count 6.9, Red Blood Count 3.82L, Hemoglobin 11.9L, Hematocrit 34.1L

, Mean Corpuscular Volume 89, Mean Corpuscular Hemoglobin 31.0, Mean 

Corpuscular Hemoglobin Concent 34.8, Red Cell Distribution Width 11.4L, 

Platelet Count 119L, Mean Platelet Volume 7.8, Neutrophils (%) (Auto) , 

Lymphocytes (%) (Auto) , Monocytes (%) (Auto) , Eosinophils (%) (Auto) , 

Basophils (%) (Auto) , Differential Total Cells Counted 100, Neutrophils % (

Manual) 89H, Lymphocytes % (Manual) 10L, Monocytes % (Manual) 1, Eosinophils % (

Manual) 0, Basophils % (Manual) 0, Band Neutrophils 0, Platelet Estimate 

DecreasedL, Platelet Morphology Normal, Red Blood Cell Morphology Normal, 

Sodium Level 143, Potassium Level 4.1, Chloride Level 107, Carbon Dioxide Level 

30, Anion Gap 6, Blood Urea Nitrogen 16, Creatinine 0.5L, Estimat Glomerular 

Filtration Rate > 60, Glucose Level 178H, Calcium Level 9.0


Height (Feet):  5


Height (Inches):  4.00


Weight (Pounds):  135


General Appearance:  no apparent distress


Respiratory/Chest:  lungs clear


Abdomen:  soft


Objective


No change











Jonathan Jacobson MD Apr 18, 2020 11:47

## 2020-04-19 VITALS — DIASTOLIC BLOOD PRESSURE: 72 MMHG | SYSTOLIC BLOOD PRESSURE: 133 MMHG

## 2020-04-19 VITALS — SYSTOLIC BLOOD PRESSURE: 131 MMHG | DIASTOLIC BLOOD PRESSURE: 70 MMHG

## 2020-04-19 VITALS — DIASTOLIC BLOOD PRESSURE: 66 MMHG | SYSTOLIC BLOOD PRESSURE: 121 MMHG

## 2020-04-19 VITALS — SYSTOLIC BLOOD PRESSURE: 121 MMHG | DIASTOLIC BLOOD PRESSURE: 81 MMHG

## 2020-04-19 VITALS — DIASTOLIC BLOOD PRESSURE: 71 MMHG | SYSTOLIC BLOOD PRESSURE: 127 MMHG

## 2020-04-19 VITALS — SYSTOLIC BLOOD PRESSURE: 127 MMHG | DIASTOLIC BLOOD PRESSURE: 79 MMHG

## 2020-04-19 LAB
ADD MANUAL DIFF: NO
ANION GAP SERPL CALC-SCNC: 10 MMOL/L (ref 5–15)
BASOPHILS NFR BLD AUTO: 0.2 % (ref 0–2)
BUN SERPL-MCNC: 15 MG/DL (ref 7–18)
CALCIUM SERPL-MCNC: 8.1 MG/DL (ref 8.5–10.1)
CHLORIDE SERPL-SCNC: 107 MMOL/L (ref 98–107)
CO2 SERPL-SCNC: 26 MMOL/L (ref 21–32)
CREAT SERPL-MCNC: 0.4 MG/DL (ref 0.55–1.3)
EOSINOPHIL NFR BLD AUTO: 0 % (ref 0–3)
ERYTHROCYTE [DISTWIDTH] IN BLOOD BY AUTOMATED COUNT: 11 % (ref 11.6–14.8)
HCT VFR BLD CALC: 32.1 % (ref 37–47)
HGB BLD-MCNC: 11.4 G/DL (ref 12–16)
LYMPHOCYTES NFR BLD AUTO: 12.8 % (ref 20–45)
MCV RBC AUTO: 88 FL (ref 80–99)
MONOCYTES NFR BLD AUTO: 3 % (ref 1–10)
NEUTROPHILS NFR BLD AUTO: 84 % (ref 45–75)
PLATELET # BLD: 151 K/UL (ref 150–450)
POTASSIUM SERPL-SCNC: 4 MMOL/L (ref 3.5–5.1)
RBC # BLD AUTO: 3.65 M/UL (ref 4.2–5.4)
SODIUM SERPL-SCNC: 143 MMOL/L (ref 136–145)
WBC # BLD AUTO: 8.7 K/UL (ref 4.8–10.8)

## 2020-04-19 RX ADMIN — Medication SCH MG: at 08:41

## 2020-04-19 RX ADMIN — INSULIN ASPART SCH UNITS: 100 INJECTION, SOLUTION INTRAVENOUS; SUBCUTANEOUS at 17:36

## 2020-04-19 RX ADMIN — INSULIN ASPART SCH UNITS: 100 INJECTION, SOLUTION INTRAVENOUS; SUBCUTANEOUS at 06:19

## 2020-04-19 RX ADMIN — DEXAMETHASONE SODIUM PHOSPHATE SCH MG: 4 INJECTION, SOLUTION INTRA-ARTICULAR; INTRALESIONAL; INTRAMUSCULAR; INTRAVENOUS; SOFT TISSUE at 17:26

## 2020-04-19 RX ADMIN — VANCOMYCIN HYDROCHLORIDE SCH MG: 500 INJECTION, POWDER, LYOPHILIZED, FOR SOLUTION INTRAVENOUS at 12:40

## 2020-04-19 RX ADMIN — DEXAMETHASONE SODIUM PHOSPHATE SCH MG: 4 INJECTION, SOLUTION INTRA-ARTICULAR; INTRALESIONAL; INTRAMUSCULAR; INTRAVENOUS; SOFT TISSUE at 12:22

## 2020-04-19 RX ADMIN — TRAMADOL HYDROCHLORIDE PRN MG: 50 TABLET, FILM COATED ORAL at 22:01

## 2020-04-19 RX ADMIN — VANCOMYCIN HYDROCHLORIDE SCH MG: 500 INJECTION, POWDER, LYOPHILIZED, FOR SOLUTION INTRAVENOUS at 17:26

## 2020-04-19 RX ADMIN — Medication SCH TAB: at 12:22

## 2020-04-19 RX ADMIN — INSULIN ASPART SCH UNITS: 100 INJECTION, SOLUTION INTRAVENOUS; SUBCUTANEOUS at 22:04

## 2020-04-19 RX ADMIN — SODIUM CHLORIDE SCH MLS/HR: 0.9 INJECTION INTRAVENOUS at 21:00

## 2020-04-19 RX ADMIN — DEXAMETHASONE SODIUM PHOSPHATE SCH MG: 4 INJECTION, SOLUTION INTRA-ARTICULAR; INTRALESIONAL; INTRAMUSCULAR; INTRAVENOUS; SOFT TISSUE at 23:31

## 2020-04-19 RX ADMIN — INSULIN ASPART SCH UNITS: 100 INJECTION, SOLUTION INTRAVENOUS; SUBCUTANEOUS at 12:40

## 2020-04-19 RX ADMIN — DEXAMETHASONE SODIUM PHOSPHATE SCH MG: 4 INJECTION, SOLUTION INTRA-ARTICULAR; INTRALESIONAL; INTRAMUSCULAR; INTRAVENOUS; SOFT TISSUE at 06:18

## 2020-04-19 RX ADMIN — Medication SCH TAB: at 17:26

## 2020-04-19 RX ADMIN — DEXAMETHASONE SODIUM PHOSPHATE SCH MG: 4 INJECTION, SOLUTION INTRA-ARTICULAR; INTRALESIONAL; INTRAMUSCULAR; INTRAVENOUS; SOFT TISSUE at 00:34

## 2020-04-19 RX ADMIN — VANCOMYCIN HYDROCHLORIDE SCH MG: 500 INJECTION, POWDER, LYOPHILIZED, FOR SOLUTION INTRAVENOUS at 08:40

## 2020-04-19 RX ADMIN — Medication SCH MG: at 17:26

## 2020-04-19 RX ADMIN — VANCOMYCIN HYDROCHLORIDE SCH MG: 500 INJECTION, POWDER, LYOPHILIZED, FOR SOLUTION INTRAVENOUS at 22:00

## 2020-04-19 RX ADMIN — INSULIN DETEMIR SCH UNITS: 100 INJECTION, SOLUTION SUBCUTANEOUS at 22:03

## 2020-04-19 NOTE — NUR
NURSE NOTES:

PT REFUSING SCD. STATES THEY SUFFOCATE HER LEGS. RN EDUCATED PT THEY ARE TO PROMOTE BLOOD 
CIRCULATION AND PREVENT BLOOD CLOTS. PT IS AT INCREASED RISK DUE TO BEDBOUND STATUS. PT 
CONTINUED TO REFUSE.

## 2020-04-19 NOTE — NEUROLOGY PROGRESS NOTE
Interim History


Interim History


ROS Limited/Unobtainable:  No


Interim History


able to move LEs distally now, improved with steroids





Objective


Physical Exam





Last Vital Signs








  Date Time  Temp Pulse Resp B/P (MAP) Pulse Ox O2 Delivery O2 Flow Rate FiO2


 


4/19/20 16:06 97.2 87 18 121/81 (94) 97   


 


4/19/20 09:00      Room Air  











Laboratory Tests








Test


  4/19/20


06:45


 


White Blood Count


  8.7 K/UL


(4.8-10.8)


 


Red Blood Count


  3.65 M/UL


(4.20-5.40)  L


 


Hemoglobin


  11.4 G/DL


(12.0-16.0)  L


 


Hematocrit


  32.1 %


(37.0-47.0)  L


 


Mean Corpuscular Volume 88 FL (80-99)  


 


Mean Corpuscular Hemoglobin


  31.2 PG


(27.0-31.0)  H


 


Mean Corpuscular Hemoglobin


Concent 35.5 G/DL


(32.0-36.0)


 


Red Cell Distribution Width


  11.0 %


(11.6-14.8)  L


 


Platelet Count


  151 K/UL


(150-450)


 


Mean Platelet Volume


  7.4 FL


(6.5-10.1)


 


Neutrophils (%) (Auto)


  84.0 %


(45.0-75.0)  H


 


Lymphocytes (%) (Auto)


  12.8 %


(20.0-45.0)  L


 


Monocytes (%) (Auto)


  3.0 %


(1.0-10.0)


 


Eosinophils (%) (Auto)


  0.0 %


(0.0-3.0)


 


Basophils (%) (Auto)


  0.2 %


(0.0-2.0)


 


Sodium Level


  143 MMOL/L


(136-145)


 


Potassium Level


  4.0 MMOL/L


(3.5-5.1)


 


Chloride Level


  107 MMOL/L


()


 


Carbon Dioxide Level


  26 MMOL/L


(21-32)


 


Anion Gap


  10 mmol/L


(5-15)


 


Blood Urea Nitrogen


  15 mg/dL


(7-18)


 


Creatinine


  0.4 MG/DL


(0.55-1.30)  L


 


Estimat Glomerular Filtration


Rate > 60 mL/min


(>60)


 


Glucose Level


  188 MG/DL


()  H


 


Calcium Level


  8.1 MG/DL


(8.5-10.1)  L


 


Thyroid Stimulating Hormone


(TSH) 0.099 uiU/mL


(0.358-3.740)


 


Free Thyroxine


  1.14 NG/DL


(0.76-1.46)








Head:  normocophalic


Neck:  no rigidity


EENT:  benign





Neurologic Exam


Mental Status:  awake


Speech:  normal speech


Language:  normal language


Cranial Nerve VII:  no facial asymmetry


Objective


alert, oriented x 2, conversant


UEs 5/5


LEs 1/5 flaccid 


cc 35 min





Impression/Recommendations


Problems:  


(1) STEMI (ST elevation myocardial infarction)


(2) Atrial fibrillation with rapid ventricular response


(3) Prolonged Q-T interval on ECG


(4) DKA (diabetic ketoacidoses)


(5) Hypernatremia


(6) UTI (urinary tract infection)


(7) Subdural hematoma


Diagnostic Impression





Given LE weakness , suspect myelopathy, mri cervical thoracic and lumbar ordered

- called radiology to expedite, imaging still not in system to review





 keppra to 250 mg bid


SBP < 150


PT OT


consider Osmin Chao MD Apr 19, 2020 20:23

## 2020-04-19 NOTE — GENERAL PROGRESS NOTE
Assessment/Plan


Problem List:  


(1) Abnormal thyroid blood test


ICD Codes:  R79.89 - Other specified abnormal findings of blood chemistry


SNOMED:  761750946, 282625828794433


(2) Diabetes mellitus out of control


ICD Codes:  E11.65 - Type 2 diabetes mellitus with hyperglycemia


SNOMED:  15829800, 754290947


(3) DKA (diabetic ketoacidoses)


ICD Codes:  E11.10 - Type 2 diabetes mellitus with ketoacidosis without coma


SNOMED:  507210352, 35862386


(4) STEMI (ST elevation myocardial infarction)


ICD Codes:  I21.3 - ST elevation (STEMI) myocardial infarction of unspecified 

site


SNOMED:  394414661


(5) Prolonged Q-T interval on ECG


ICD Codes:  R94.31 - Abnormal electrocardiogram [ECG] [EKG]


SNOMED:  620528282


(6) Atrial fibrillation with rapid ventricular response


ICD Codes:  I48.91 - Unspecified atrial fibrillation


SNOMED:  542649489519805


(7) Hypernatremia


ICD Codes:  E87.0 - Hyperosmolality and hypernatremia


SNOMED:  463624980, 24942121


Assessment/Plan:


continue Levemir 30 units qhs 


increase Novolog to 16 units ac tid 


continue NISS ac / hs 





repeat thyroid function tests





Subjective


Allergies:  


Coded Allergies:  


     No Known Allergies (Unverified , 4/15/15)


Subjective


events noted 


interval notes reviewed 


mealtime glucose is elevated 


she is on dexa 4 mg every 6 hours 











Item Value  Date Time


 


Bedside Blood Glucose 184 mg/dl H 4/19/20 0624


 


Bedside Blood Glucose 334 mg/dl H 4/18/20 2118


 


Bedside Blood Glucose 255 mg/dl H 4/18/20 1739


 


Bedside Blood Glucose 155 mg/dl H 4/18/20 1326











Objective





Last 24 Hour Vital Signs








  Date Time  Temp Pulse Resp B/P (MAP) Pulse Ox O2 Delivery O2 Flow Rate FiO2


 


4/19/20 08:41  63  131/70    


 


4/19/20 08:00 98.7 63 18 131/70 (90) 94   


 


4/19/20 04:08 96.6 69 18 127/71 (89) 93   


 


4/19/20 00:00 97.0 71 22 133/72 (92) 96   


 


4/18/20 21:17  99  127/75    


 


4/18/20 21:00      Room Air  


 


4/18/20 20:00 96.4 99 20 127/75 (92) 95   


 


4/18/20 16:00 97.7 84 18 109/68 (82) 97   


 


4/18/20 12:00 97.2 80 18 107/64 (78) 96   


 


4/18/20 09:00      Room Air  


 


4/18/20 08:49  83  113/66    

















Intake and Output  


 


 4/18/20 4/19/20





 19:00 07:00


 


Intake Total 1140 ml 50 ml


 


Balance 1140 ml 50 ml


 


  


 


Intake Oral 1140 ml 


 


IV Total  50 ml


 


# Voids 3 2


 


# Bowel Movements 4 








Height (Feet):  5


Height (Inches):  4.00


Weight (Pounds):  135


General Appearance:  no apparent distress


Neck:  normal alignment


Cardiovascular:  normal rate


Respiratory/Chest:  decreased breath sounds


Abdomen:  normal bowel sounds


Objective





Current Medications








 Medications


  (Trade)  Dose


 Ordered  Sig/Meghan


 Route


 PRN Reason  Start Time


 Stop Time Status Last Admin


Dose Admin


 


 Acetaminophen


  (Tylenol)  650 mg  Q6H  PRN


 ORAL


 Mild Pain (Pain Scale 1-3)  4/17/20 10:00


 5/17/20 09:59   


 


 


 Ascorbic Acid


  (Vitamin C)  250 mg  TWICE A  DAY


 ORAL


   4/16/20 18:00


 5/15/20 17:59  4/19/20 08:41


 


 


 Atorvastatin


 Calcium


  (Lipitor)  10 mg  BEDTIME


 ORAL


   4/16/20 21:00


 7/10/20 20:59  4/18/20 21:17


 


 


 Barium Sulfate


  (Readi-Cat 2)  450 ml  NOW  PRN


 ORAL


 Radiology Procedure  4/17/20 11:30


 4/19/20 11:29   


 


 


 Ceftriaxone


 Sodium 1 gm/


 Dextrose  50 ml @ 


 100 mls/hr  Q24H


 IVPB


   4/16/20 21:00


 4/21/20 20:59  4/18/20 21:17


 


 


 Dexamethasone


 Sodium Phosphate


  (Decadron 4mg/ml


 vial)  4 mg  Q6HR


 IVP


   4/17/20 00:00


 7/16/20 00:00  4/19/20 06:18


 


 


 Dextrose


  (Dextrose 50%)  25 ml  Q30M  PRN


 IV


 Hypoglycemia  4/16/20 16:45


 7/9/20 21:44   


 


 


 Dextrose


  (Dextrose 50%)  50 ml  Q30M  PRN


 IV


 Hypoglycemia  4/16/20 16:45


 7/9/20 21:44   


 


 


 Gadobutrol


  (Gadavist)  7.5 mmol  NOW  PRN


 IV


 Radiology Procedure  4/16/20 22:00


 4/19/20 21:57   


 


 


 Insulin Aspart


  (NovoLOG)    AC+HS


 SUBQ


   4/16/20 21:00


 7/10/20 09:29  4/19/20 06:19


 


 


 Insulin Aspart


  (NovoLOG)  12 units  NOVOTIAC


 SUBQ


   4/16/20 16:50


 7/11/20 11:49  4/19/20 06:19


 


 


 Insulin Detemir


  (Levemir)  30 units  QHS


 SUBQ


   4/16/20 21:00


 7/11/20 20:59  4/18/20 21:18


 


 


 Iohexol


  (OMNIPAQUE-300


 100ml)  100 ml  ONCE  PRN


 INJ


 radiology procedure  4/17/20 11:30


 4/19/20 11:29   


 


 


 Levetiracetam


  (Keppra)  250 mg  Q12HR


 ORAL


   4/16/20 21:00


 5/29/20 20:59  4/19/20 08:41


 


 


 Lorazepam


  (Ativan)  1 mg  Q6H  PRN


 ORAL


 For Anxiety  4/17/20 08:30


 4/24/20 08:29   


 


 


 Metoprolol


 Tartrate


  (Lopressor)  25 mg  Q12HR


 ORAL


   4/16/20 21:00


 7/10/20 20:59  4/18/20 21:17


 


 


 Pantoprazole


  (Protonix)  40 mg  BID


 ORAL


   4/16/20 18:00


 5/14/20 08:59  4/19/20 08:40


 


 


 Potassium Chloride


  (K-Dur)  40 meq  BID


 ORAL


   4/16/20 18:00


 7/12/20 09:14  4/19/20 08:41


 


 


 Tramadol HCl


  (Ultram)  25 mg  Q6H  PRN


 ORAL


 Severe Breakthru Pain (>7)  4/16/20 16:48


 4/23/20 16:47   


 


 


 Vancomycin HCl


  (Firvanq)  125 mg  FOUR TIMES A  DAY


 ORAL


   4/16/20 18:00


 4/26/20 17:59  4/19/20 08:40


 

















Kevin Mohr MD Apr 19, 2020 08:52

## 2020-04-19 NOTE — NEPHROLOGY PROGRESS NOTE
Assessment/Plan


Problem List:  


(1) Hypernatremia


Assessment:  Improving





(2) UTI (urinary tract infection)


(3) Subdural hematoma


(4) DKA (diabetic ketoacidoses)


(5) Electrolyte imbalance


Assessment





Hypernatremia most likely due to free water deficit


Hypokalemia, hypophosphatemia


Hyperglycemia and DKA


Evidence of UTI


Right-sided weakness with 3 mm subdural hematoma found in CT scan


Plan


Patient has C. difficile colitis now


Lactobacillus added


Discontinue IV fluid


Discontinue Vincent catheter


Change Protonix to p.o.


Stop Neutra-Phos due to diarrhea





Potassium and phosphorus and magnesium supplement as needed


Diet started as the patient is more alert


Low dose of Lopressor


Monitor renal parameters and electrolytes


Neuro eval noted


Keep the blood pressure and blood sugar in check


Urine studies


Per orders





Subjective


ROS Limited/Unobtainable:  No


Constitutional:  Reports: malaise, other





Objective


Objective





Last 24 Hour Vital Signs








  Date Time  Temp Pulse Resp B/P (MAP) Pulse Ox O2 Delivery O2 Flow Rate FiO2


 


4/19/20 08:41  63  131/70    


 


4/19/20 08:00 98.7 63 18 131/70 (90) 94   


 


4/19/20 04:08 96.6 69 18 127/71 (89) 93   


 


4/19/20 00:00 97.0 71 22 133/72 (92) 96   


 


4/18/20 21:17  99  127/75    


 


4/18/20 21:00      Room Air  


 


4/18/20 20:00 96.4 99 20 127/75 (92) 95   


 


4/18/20 16:00 97.7 84 18 109/68 (82) 97   


 


4/18/20 12:00 97.2 80 18 107/64 (78) 96   

















Intake and Output  


 


 4/18/20 4/19/20





 19:00 07:00


 


Intake Total 1140 ml 50 ml


 


Balance 1140 ml 50 ml


 


  


 


Intake Oral 1140 ml 


 


IV Total  50 ml


 


# Voids 3 2


 


# Bowel Movements 4 











Current Medications








 Medications


  (Trade)  Dose


 Ordered  Sig/Meghan


 Route


 PRN Reason  Start Time


 Stop Time Status Last Admin


Dose Admin


 


 Acetaminophen


  (Tylenol)  650 mg  Q6H  PRN


 ORAL


 Mild Pain (Pain Scale 1-3)  4/17/20 10:00


 5/17/20 09:59   


 


 


 Ascorbic Acid


  (Vitamin C)  250 mg  TWICE A  DAY


 ORAL


   4/16/20 18:00


 5/15/20 17:59  4/19/20 08:41


 


 


 Atorvastatin


 Calcium


  (Lipitor)  10 mg  BEDTIME


 ORAL


   4/16/20 21:00


 7/10/20 20:59  4/18/20 21:17


 


 


 Barium Sulfate


  (Readi-Cat 2)  450 ml  NOW  PRN


 ORAL


 Radiology Procedure  4/17/20 11:30


 4/19/20 11:29   


 


 


 Ceftriaxone


 Sodium 1 gm/


 Dextrose  50 ml @ 


 100 mls/hr  Q24H


 IVPB


   4/16/20 21:00


 4/21/20 20:59  4/18/20 21:17


 


 


 Dexamethasone


 Sodium Phosphate


  (Decadron 4mg/ml


 vial)  4 mg  Q6HR


 IVP


   4/17/20 00:00


 7/16/20 00:00  4/19/20 06:18


 


 


 Dextrose


  (Dextrose 50%)  25 ml  Q30M  PRN


 IV


 Hypoglycemia  4/16/20 16:45


 7/9/20 21:44   


 


 


 Dextrose


  (Dextrose 50%)  50 ml  Q30M  PRN


 IV


 Hypoglycemia  4/16/20 16:45


 7/9/20 21:44   


 


 


 Gadobutrol


  (Gadavist)  7.5 mmol  NOW  PRN


 IV


 Radiology Procedure  4/16/20 22:00


 4/19/20 21:57   


 


 


 Insulin Aspart


  (NovoLOG)    AC+HS


 SUBQ


   4/16/20 21:00


 7/10/20 09:29  4/19/20 06:19


 


 


 Insulin Aspart


  (NovoLOG)  16 units  NOVOTIAC


 SUBQ


   4/19/20 11:50


 7/11/20 11:49   


 


 


 Insulin Detemir


  (Levemir)  30 units  QHS


 SUBQ


   4/16/20 21:00


 7/11/20 20:59  4/18/20 21:18


 


 


 Iohexol


  (OMNIPAQUE-300


 100ml)  100 ml  ONCE  PRN


 INJ


 radiology procedure  4/17/20 11:30


 4/19/20 11:29   


 


 


 Levetiracetam


  (Keppra)  250 mg  Q12HR


 ORAL


   4/16/20 21:00


 5/29/20 20:59  4/19/20 08:41


 


 


 Lorazepam


  (Ativan)  1 mg  Q6H  PRN


 ORAL


 For Anxiety  4/17/20 08:30


 4/24/20 08:29   


 


 


 Metoprolol


 Tartrate


  (Lopressor)  25 mg  Q12HR


 ORAL


   4/16/20 21:00


 7/10/20 20:59  4/18/20 21:17


 


 


 Pantoprazole


  (Protonix)  40 mg  BID


 ORAL


   4/16/20 18:00


 5/14/20 08:59  4/19/20 08:40


 


 


 Potassium Chloride


  (K-Dur)  40 meq  BID


 ORAL


   4/16/20 18:00


 7/12/20 09:14  4/19/20 08:41


 


 


 Tramadol HCl


  (Ultram)  25 mg  Q6H  PRN


 ORAL


 Severe Breakthru Pain (>7)  4/16/20 16:48


 4/23/20 16:47   


 


 


 Vancomycin HCl


  (Firvanq)  125 mg  FOUR TIMES A  DAY


 ORAL


   4/16/20 18:00


 4/26/20 17:59  4/19/20 08:40


 





Laboratory Tests


4/19/20 06:45: 


White Blood Count 8.7, Red Blood Count 3.65L, Hemoglobin 11.4L, Hematocrit 32.1L

, Mean Corpuscular Volume 88, Mean Corpuscular Hemoglobin 31.2H, Mean 

Corpuscular Hemoglobin Concent 35.5, Red Cell Distribution Width 11.0L, 

Platelet Count 151, Mean Platelet Volume 7.4, Neutrophils (%) (Auto) 84.0H, 

Lymphocytes (%) (Auto) 12.8L, Monocytes (%) (Auto) 3.0, Eosinophils (%) (Auto) 

0.0, Basophils (%) (Auto) 0.2, Sodium Level 143, Potassium Level 4.0, Chloride 

Level 107, Carbon Dioxide Level 26, Anion Gap 10, Blood Urea Nitrogen 15, 

Creatinine 0.4L, Estimat Glomerular Filtration Rate > 60, Glucose Level 188H, 

Calcium Level 8.1L, Thyroid Stimulating Hormone (TSH) [Pending], Free Thyroxine 

[Pending]


Height (Feet):  5


Height (Inches):  4.00


Weight (Pounds):  135


Cardiovascular:  normal rate


Respiratory/Chest:  lungs clear


Abdomen:  distended


Objective


No change











Jonathan Jacobson MD Apr 19, 2020 09:56

## 2020-04-19 NOTE — NUR
NURSE NOTES:

PT AXOX3, CALM, RESTING IN BED. PT ABLE TO VERBALIZE NEEDS. WEAKNESS NOTED IN BUE. PT STATES 
THERE IS DECREASED SENSATION IN LEFT UPPER EXTREMITY WHICH IS AT BASELINE. PT CONTINUES TO 
HAVE LOOSE STOOLS. PT WAS CLEANED AND REPOSITIONED. PLACED IN SEMI-MARCANO'S POSITION WITH 
HOB ELEVATED. SIDERAILS PADDED FOR SEIZURE PRECAUTIONS. BED IN LOWEST POSITION WITH BEDSIDE 
RAILS X3 RAISED. PT HAS HER CELLPHONE AND BLANK CHECK AT BEDSIDE. PT STATES SHE DOES NOT 
WANT TO KEEP IT IN [employer] "JUST IN CASE I NEED TO USE IT". IN NO APPARENT 
DISTRESS AT THIS TIME. WILL CONTINUE TO MONITOR.

## 2020-04-19 NOTE — SURGERY PROGRESS NOTE
Surgery Progress Note


Subjective


Additional Comments


no complaints


ct noted





Objective





Last 24 Hour Vital Signs








  Date Time  Temp Pulse Resp B/P (MAP) Pulse Ox O2 Delivery O2 Flow Rate FiO2


 


4/19/20 12:06 98.1 98 18 127/79 (95) 96   


 


4/19/20 09:00      Room Air  


 


4/19/20 08:41  63  131/70    


 


4/19/20 08:00 98.7 63 18 131/70 (90) 94   


 


4/19/20 04:08 96.6 69 18 127/71 (89) 93   


 


4/19/20 00:00 97.0 71 22 133/72 (92) 96   


 


4/18/20 21:17  99  127/75    


 


4/18/20 21:00      Room Air  


 


4/18/20 20:00 96.4 99 20 127/75 (92) 95   


 


4/18/20 16:00 97.7 84 18 109/68 (82) 97   








I&O











Intake and Output  


 


 4/18/20 4/19/20





 19:00 07:00


 


Intake Total 1140 ml 50 ml


 


Balance 1140 ml 50 ml


 


  


 


Intake Oral 1140 ml 


 


IV Total  50 ml


 


# Voids 3 2


 


# Bowel Movements 4 








Cardiovascular:  RSR


Respiratory:  clear


Abdomen:  soft, non-tender, present bowel sounds


Extremities:  no edema, no tenderness, no cyanosis





Laboratory Tests








Test


  4/19/20


06:45


 


White Blood Count


  8.7 K/UL


(4.8-10.8)


 


Red Blood Count


  3.65 M/UL


(4.20-5.40)  L


 


Hemoglobin


  11.4 G/DL


(12.0-16.0)  L


 


Hematocrit


  32.1 %


(37.0-47.0)  L


 


Mean Corpuscular Volume 88 FL (80-99)  


 


Mean Corpuscular Hemoglobin


  31.2 PG


(27.0-31.0)  H


 


Mean Corpuscular Hemoglobin


Concent 35.5 G/DL


(32.0-36.0)


 


Red Cell Distribution Width


  11.0 %


(11.6-14.8)  L


 


Platelet Count


  151 K/UL


(150-450)


 


Mean Platelet Volume


  7.4 FL


(6.5-10.1)


 


Neutrophils (%) (Auto)


  84.0 %


(45.0-75.0)  H


 


Lymphocytes (%) (Auto)


  12.8 %


(20.0-45.0)  L


 


Monocytes (%) (Auto)


  3.0 %


(1.0-10.0)


 


Eosinophils (%) (Auto)


  0.0 %


(0.0-3.0)


 


Basophils (%) (Auto)


  0.2 %


(0.0-2.0)


 


Sodium Level


  143 MMOL/L


(136-145)


 


Potassium Level


  4.0 MMOL/L


(3.5-5.1)


 


Chloride Level


  107 MMOL/L


()


 


Carbon Dioxide Level


  26 MMOL/L


(21-32)


 


Anion Gap


  10 mmol/L


(5-15)


 


Blood Urea Nitrogen


  15 mg/dL


(7-18)


 


Creatinine


  0.4 MG/DL


(0.55-1.30)  L


 


Estimat Glomerular Filtration


Rate > 60 mL/min


(>60)


 


Glucose Level


  188 MG/DL


()  H


 


Calcium Level


  8.1 MG/DL


(8.5-10.1)  L


 


Thyroid Stimulating Hormone


(TSH) 0.099 uiU/mL


(0.358-3.740)


 


Free Thyroxine


  1.14 NG/DL


(0.76-1.46)











Plan


Problems:  


(1) Decubitus skin ulcer


Assessment & Plan:  Patient noted to have rapid onset Sacral DTPI. Per Primary 

Nurse pt noted on prior shift to have non-blanching erythema as per report.  

All preventive measures were taken to reposition patient and off-load pressure.

  Despite repositioning and applying Moisture Barrier Paste patient noted to 

have further skin decline. 


Sacral DTPI is irregular shaped, Purple and indurated in colour over 

sacrococcygeal with surrounding non-blanching erythema along borders.(L)7.1cm x 

(W)6.5cm. 


Nutritional intake varies 30-45% daily. 


Non-Blanching erythema without fluctuance noted to R and L heels.


Both heels observed floated with pillows Off mattress.





Tx.Plan: 


Apply Moisture Barrier Paste to Sacrum. Cover with Optifoam drsg. Change every 

3 days and prn.


            


Apply Cavilon Skin Barrier to both heels. Cover each heel with Optifoam drsg. 

Change every 7 days and prn.


            


Reposition at least every 2hours or as tolerated.


            


Off-load heels with pillow.


            


APM/VIK Mattress overlay.





MRI spine noted


neuro input appreciated 





(2) Deep tissue injury


(3) Malnutrition


Assessment & Plan:  DAILY ESTIMATED NEEDS:


Needs based on DM 61.5kg  


25-30  kcals/kg 


3616-9304  total kcals


1.25-1.5  g protein/kg


77-92  g total protein 


25-30  mL/kg


5437-4686  total fluid mLs





NUTRITION DIAGNOSIS:


Altered nutrition related lab values r/t DKA, clinical status as evidenced


by A1C 10.4,  on adm, (+) acetone, now w/ critically elev Na(161*)


and low K(2.4*).





CURRENT DIET: CCHO LOW puree + NTL     





PO DIET RECOMMENDATIONS:


CCHO LOW diet/ texture per SLP  





ADDITIONAL RECOMMENDATIONS:


1) W/ variable po intake add Glucerna 1 tetra per day


    Add high pro/1 carb snacks in b/w meals   


2) On D5 for hydration, monitor BG  


3) Wound care: Add JONATHAN BID + Vit C 250mg daily (f/up w/ WC eval) 


4) Obtain a calibrated bed scale wts 


    EMR wt: 110#      vs        Bed scale wt: 135# 





(4) Abnormal thyroid blood test


(5) Electrolyte imbalance


Assessment & Plan:  1.  No pulmonary embolus, somewhat limited at the bases due 

to motion.


2.  Bibasilar lung atelectasis/airspace disease.


3.  3 mm nodule along the right middle lobe fissure. 3.6 mm nodule left 


lower lobe along the fissure.  Fleischner Society Guidelines for low-risk 


patients, no follow-up is necessary.  For high-risk patients (smoking 


history or other known risk factors) an optional chest CT at 12 months 


could be performed.


4.  Mild bilateral perinephric stranding.  May be senescent, correlate 


for infection.


5.  Distended gallbladder with hyperdensity may be sludge or stones.


6.  Prominent left lobe of the liver is slightly nodular in appearance.





US ordered 





(6) Subdural hematoma


Assessment & Plan:  as per neurology


MRI noted


bm biopsy pending 














Tomas Adair Apr 19, 2020 13:33

## 2020-04-19 NOTE — NUR
NURSE NOTES:

Received report from DEREK Rodriguez. AAO x 3, on room air. Speaking Urdu and Uzbek. IV sites 
intact and patent. No labored breathing. No acute distress noted. Aspiration precaution 
maintained. HOB elevated. Side rails padded. Bed locked, lowest position, alarm on, side 
rails up, call light within reach. Will continue to monitor.

## 2020-04-19 NOTE — GENERAL PROGRESS NOTE
Assessment/Plan


Assessment/Plan:


65-year-old female with PMH of medical noncompliance, DM type II who presents 

with b/l weakness, N/V, GLF, on admission pt found to be in DKA with 's.





#DKA


#E. coli UTI


#E. coli bacteremia


#Uncontrolled DM, Hgb A1C 10.4


#c. diff colitis


-continue in-patient medical care


-s/p DKA protocol with improved AG, AG 21-> 14


-CT abd reviewed


-4/10: UCx e. coli


-4/10: BCx w/E. coli 2/2, repeat 4/12 BCx NGTD


-c.diff positive, contact precautions


-cont. accuchecks, ISS qAC/HS


-Endo following: Levemir to 30 units qhs, increase Novolog to 12 units ac tid


-ID, Dr. Looney: CTX, day #7 of abx, PO vanco for c diff





#Multilevel Thoracic BM enhancements, concern for malignancy


-suspicions for metastatic neoplasm, infiltrative neoplasm such as myeloma or 

lymphoma


-4/10: CT abd/pel w/o contrast reviewed with no abnormalities


-4/17 CT C/A/P without any lesions suspicious for primary malignancy


-CEA elevated


- wnl


-Follow up BM biopsy and PEP/UPEP results


-Heme/Onc following





#Right sided weakness


#Generalized Weakness


#SDH 3mm no midline shift


-weakness may be multifactorial, metabolic


-pt AOx3, pt noted fall 3 days ago


-found on CT head, repeat CT head stable


-4/14: CT head obtained given worsening weakness, stable compared to previous


-d/w Neuro, no need for transfer at this time, will decrease keppra


-cont. fall precautions


-holding ASA given SDH


-statin


-PT treat and eval


-d/w Neurology, Dr. Cheney, weakness does not appear to be neurologic at this 

time


-CM for d/c planning, plan for CRI on d/c








#Elevated d-dimer


#Tachycardia - resolved


-likely multifactorial given infection/DKA, PE has been ruled out


-EKG w/NSR


-CTA thorax negative for PE


-tachycardia likely 2/2 dehydration, encourage PO intake


-no OAC at this time





#Hypernatremia - resolved


#Hypokalemia - improved


#Hypophosphatemia


-Hypernatremia likely 2/2 free water deficit


-replace electrolytes, cont. to monitor and replace PRN


-bruner d/c'd


-Nephro following, IVF and electrolytes per nephro





DVT - SCDs for now given SDH





Time spent: 35 minutes on this patient's case, d/w pt POC, updated RN. D/w 

consulting MDs, RN, case management





Time of note may not reflect time of encounter.





Subjective


Date patient seen:  Apr 19, 2020


Time patient seen:  13:12


ROS Limited/Unobtainable:  No


Cardiovascular:  Denies: chest pain


Respiratory:  Denies: cough


Gastrointestinal/Abdominal:  Reports: diarrhea; Denies: abdomen distended, 

abdominal pain


Allergies:  


Coded Allergies:  


     No Known Allergies (Unverified , 4/15/15)


Subjective


Follow up for uncontrolled DM, DKA, UTI, C. difficile colitis.


No acute events overnight


Persistent diarrhea reported





Objective





Last 24 Hour Vital Signs








  Date Time  Temp Pulse Resp B/P (MAP) Pulse Ox O2 Delivery O2 Flow Rate FiO2


 


4/19/20 12:06 98.1 98 18 127/79 (95) 96   


 


4/19/20 09:00      Room Air  


 


4/19/20 08:41  63  131/70    


 


4/19/20 08:00 98.7 63 18 131/70 (90) 94   


 


4/19/20 04:08 96.6 69 18 127/71 (89) 93   


 


4/19/20 00:00 97.0 71 22 133/72 (92) 96   


 


4/18/20 21:17  99  127/75    


 


4/18/20 21:00      Room Air  


 


4/18/20 20:00 96.4 99 20 127/75 (92) 95   


 


4/18/20 16:00 97.7 84 18 109/68 (82) 97   

















Intake and Output  


 


 4/18/20 4/19/20





 19:00 07:00


 


Intake Total 1140 ml 50 ml


 


Balance 1140 ml 50 ml


 


  


 


Intake Oral 1140 ml 


 


IV Total  50 ml


 


# Voids 3 2


 


# Bowel Movements 4 








Laboratory Tests


4/19/20 06:45: 


White Blood Count 8.7, Red Blood Count 3.65L, Hemoglobin 11.4L, Hematocrit 32.1L

, Mean Corpuscular Volume 88, Mean Corpuscular Hemoglobin 31.2H, Mean 

Corpuscular Hemoglobin Concent 35.5, Red Cell Distribution Width 11.0L, 

Platelet Count 151, Mean Platelet Volume 7.4, Neutrophils (%) (Auto) 84.0H, 

Lymphocytes (%) (Auto) 12.8L, Monocytes (%) (Auto) 3.0, Eosinophils (%) (Auto) 

0.0, Basophils (%) (Auto) 0.2, Sodium Level 143, Potassium Level 4.0, Chloride 

Level 107, Carbon Dioxide Level 26, Anion Gap 10, Blood Urea Nitrogen 15, 

Creatinine 0.4L, Estimat Glomerular Filtration Rate > 60, Glucose Level 188H, 

Calcium Level 8.1L, Thyroid Stimulating Hormone (TSH) 0.099L, Free Thyroxine 

1.14


Height (Feet):  5


Height (Inches):  4.00


Weight (Pounds):  135


General Appearance:  alert


Neck:  normal alignment, supple


Cardiovascular:  normal rate, regular rhythm


Respiratory/Chest:  lungs clear, normal breath sounds


Abdomen:  non tender, soft, no organomegaly











Varun Eavns MD Apr 19, 2020 13:14

## 2020-04-19 NOTE — NUR
NURSE NOTES:

Patient had BM. Upon cleaning patient, noted that suction that was previously connected to 
purewick was misplaced and was suctioning patient's skin on left lower abdomen towards 
pelvis area. Blood filled blister noted 1x1 cm. Skin care initiated. Charge nurse made 
aware. Will notify MD and family.

## 2020-04-19 NOTE — HEMATOLOGY/ONC PROGRESS NOTE
Assessment/Plan


Assessment/Plan


Assessment and Recs:


# Multilevel bone marrow signal abnormality, as described. Differential 

considerations include  metastatic neoplasm, infiltrative neoplasm such as 

myeloma or lymphoma, exuberant hematopoietic hyperplasia, less likely systemic 

metabolic disorders. Note that this is much more striking in the thoracic spine 

than it is in the lumbar and cervical segments study earlier


--> tumor markers have been ordered


--> spep and upep


--> bone marrow biopsy has ordered


--> also ordered for ct c/a/p with iv contrast - reviewed, no primary tumor 


--> r/o hemolysis at this time, DIC


--> dw pcp


# DKA (diabetic ketoacidoses)


--> as per endo, iss and accuchecks qac and qhs


--> hgb a1c goal <8, now 10.4


# Hypernatremia


--> as per renal recs


--> continue ivfs


# UTI (urinary tract infection)


--> per id, on abx: ceftriaxone 


# Ground-level fall.


--> pt/ot as needed


# Nausea and vomiting.


--> zofran prn


# Right sided weakness


# Generalized Weakness


# SDH 3mm no midline shift


# Elevated d-dimer


# Tachycardia - resolved





Appreciate consultation and dw Rn





Subjective


Allergies:  


Coded Allergies:  


     No Known Allergies (Unverified , 4/15/15)


Subjective


4/17 cxr and labs reviewed, on room air, bp stable, bone marrow biopsy ordered 


4/19 med surg, no overnight events, ct and labs reviewed, on abx





Objective


Objective





Current Medications








 Medications


  (Trade)  Dose


 Ordered  Sig/Meghan


 Route


 PRN Reason  Start Time


 Stop Time Status Last Admin


Dose Admin


 


 Acetaminophen


  (Tylenol)  650 mg  Q6H  PRN


 ORAL


 Mild Pain (Pain Scale 1-3)  4/17/20 10:00


 5/17/20 09:59   


 


 


 Ascorbic Acid


  (Vitamin C)  250 mg  TWICE A  DAY


 ORAL


   4/16/20 18:00


 5/15/20 17:59  4/19/20 08:41


 


 


 Atorvastatin


 Calcium


  (Lipitor)  10 mg  BEDTIME


 ORAL


   4/16/20 21:00


 7/10/20 20:59  4/18/20 21:17


 


 


 Barium Sulfate


  (Readi-Cat 2)  450 ml  NOW  PRN


 ORAL


 Radiology Procedure  4/17/20 11:30


 4/19/20 11:29   


 


 


 Ceftriaxone


 Sodium 1 gm/


 Dextrose  50 ml @ 


 100 mls/hr  Q24H


 IVPB


   4/16/20 21:00


 4/21/20 20:59  4/18/20 21:17


 


 


 Dexamethasone


 Sodium Phosphate


  (Decadron 4mg/ml


 vial)  4 mg  Q6HR


 IVP


   4/17/20 00:00


 7/16/20 00:00  4/19/20 06:18


 


 


 Dextrose


  (Dextrose 50%)  25 ml  Q30M  PRN


 IV


 Hypoglycemia  4/16/20 16:45


 7/9/20 21:44   


 


 


 Dextrose


  (Dextrose 50%)  50 ml  Q30M  PRN


 IV


 Hypoglycemia  4/16/20 16:45


 7/9/20 21:44   


 


 


 Gadobutrol


  (Gadavist)  7.5 mmol  NOW  PRN


 IV


 Radiology Procedure  4/16/20 22:00


 4/19/20 21:57   


 


 


 Insulin Aspart


  (NovoLOG)    AC+HS


 SUBQ


   4/16/20 21:00


 7/10/20 09:29  4/19/20 06:19


 


 


 Insulin Aspart


  (NovoLOG)  16 units  NOVOTIAC


 SUBQ


   4/19/20 11:50


 7/11/20 11:49   


 


 


 Insulin Detemir


  (Levemir)  30 units  QHS


 SUBQ


   4/16/20 21:00


 7/11/20 20:59  4/18/20 21:18


 


 


 Iohexol


  (OMNIPAQUE-300


 100ml)  100 ml  ONCE  PRN


 INJ


 radiology procedure  4/17/20 11:30


 4/19/20 11:29   


 


 


 Levetiracetam


  (Keppra)  250 mg  Q12HR


 ORAL


   4/16/20 21:00


 5/29/20 20:59  4/19/20 08:41


 


 


 Lorazepam


  (Ativan)  1 mg  Q6H  PRN


 ORAL


 For Anxiety  4/17/20 08:30


 4/24/20 08:29   


 


 


 Metoprolol


 Tartrate


  (Lopressor)  25 mg  Q12HR


 ORAL


   4/16/20 21:00


 7/10/20 20:59  4/18/20 21:17


 


 


 Pantoprazole


  (Protonix)  40 mg  BID


 ORAL


   4/16/20 18:00


 5/14/20 08:59  4/19/20 08:40


 


 


 Potassium Chloride


  (K-Dur)  40 meq  BID


 ORAL


   4/16/20 18:00


 7/12/20 09:14  4/19/20 08:41


 


 


 Tramadol HCl


  (Ultram)  25 mg  Q6H  PRN


 ORAL


 Severe Breakthru Pain (>7)  4/16/20 16:48


 4/23/20 16:47   


 


 


 Vancomycin HCl


  (Firvanq)  125 mg  FOUR TIMES A  DAY


 ORAL


   4/16/20 18:00


 4/26/20 17:59  4/19/20 08:40


 











Last 24 Hour Vital Signs








  Date Time  Temp Pulse Resp B/P (MAP) Pulse Ox O2 Delivery O2 Flow Rate FiO2


 


4/19/20 08:41  63  131/70    


 


4/19/20 08:00 98.7 63 18 131/70 (90) 94   


 


4/19/20 04:08 96.6 69 18 127/71 (89) 93   


 


4/19/20 00:00 97.0 71 22 133/72 (92) 96   


 


4/18/20 21:17  99  127/75    


 


4/18/20 21:00      Room Air  


 


4/18/20 20:00 96.4 99 20 127/75 (92) 95   


 


4/18/20 16:00 97.7 84 18 109/68 (82) 97   


 


4/18/20 12:00 97.2 80 18 107/64 (78) 96   


 


4/18/20 09:00      Room Air  


 


4/18/20 08:49  83  113/66    


 


4/18/20 08:00 97.0 83 18 113/66 (82) 98   


 


4/18/20 04:00 98.7 83 18 120/70 (87) 97   


 


4/18/20 00:00 98.0 95 19 101/69 (80) 95   


 


4/17/20 21:00      Room Air  


 


4/17/20 20:59  90  114/67    


 


4/17/20 20:00 98.5 90 19 114/67 (83) 97   


 


4/17/20 16:00 97.7 89 17 118/69 (85) 95   


 


4/17/20 12:00 98.0 94 18 118/64 (82) 94   


 


4/17/20 09:39  83  115/65    

















Intake and Output  


 


 4/18/20 4/19/20





 19:00 07:00


 


Intake Total 1140 ml 50 ml


 


Balance 1140 ml 50 ml


 


  


 


Intake Oral 1140 ml 


 


IV Total  50 ml


 


# Voids 3 2


 


# Bowel Movements 4 











Labs








Test


  4/17/20


05:35 4/18/20


05:25 4/19/20


06:45


 


White Blood Count


  7.0 K/UL


(4.8-10.8) 6.9 K/UL


(4.8-10.8) 8.7 K/UL


(4.8-10.8)


 


Red Blood Count


  3.65 M/UL


(4.20-5.40) 3.82 M/UL


(4.20-5.40) 3.65 M/UL


(4.20-5.40)


 


Hemoglobin


  11.4 G/DL


(12.0-16.0) 11.9 G/DL


(12.0-16.0) 11.4 G/DL


(12.0-16.0)


 


Hematocrit


  32.4 %


(37.0-47.0) 34.1 %


(37.0-47.0) 32.1 %


(37.0-47.0)


 


Mean Corpuscular Volume 89 FL (80-99)  89 FL (80-99)  88 FL (80-99) 


 


Mean Corpuscular Hemoglobin


  31.3 PG


(27.0-31.0) 31.0 PG


(27.0-31.0) 31.2 PG


(27.0-31.0)


 


Mean Corpuscular Hemoglobin


Concent 35.2 G/DL


(32.0-36.0) 34.8 G/DL


(32.0-36.0) 35.5 G/DL


(32.0-36.0)


 


Red Cell Distribution Width


  11.3 %


(11.6-14.8) 11.4 %


(11.6-14.8) 11.0 %


(11.6-14.8)


 


Platelet Count


  82 K/UL


(150-450) 119 K/UL


(150-450) 151 K/UL


(150-450)


 


Mean Platelet Volume


  8.2 FL


(6.5-10.1) 7.8 FL


(6.5-10.1) 7.4 FL


(6.5-10.1)


 


Neutrophils (%) (Auto)


   % (45.0-75.0) 


   % (45.0-75.0) 


  84.0 %


(45.0-75.0)


 


Lymphocytes (%) (Auto)


   % (20.0-45.0) 


   % (20.0-45.0) 


  12.8 %


(20.0-45.0)


 


Monocytes (%) (Auto)


   % (1.0-10.0) 


   % (1.0-10.0) 


  3.0 %


(1.0-10.0)


 


Eosinophils (%) (Auto)


   % (0.0-3.0) 


   % (0.0-3.0) 


  0.0 %


(0.0-3.0)


 


Basophils (%) (Auto)


   % (0.0-2.0) 


   % (0.0-2.0) 


  0.2 %


(0.0-2.0)


 


Differential Total Cells


Counted 100 


  100 


  


 


 


Neutrophils % (Manual) 92 % (45-75)  89 % (45-75)  


 


Lymphocytes % (Manual) 7 % (20-45)  10 % (20-45)  


 


Monocytes % (Manual) 1 % (1-10)  1 % (1-10)  


 


Eosinophils % (Manual) 0 % (0-3)  0 % (0-3)  


 


Basophils % (Manual) 0 % (0-2)  0 % (0-2)  


 


Band Neutrophils 0 % (0-8)  0 % (0-8)  


 


Platelet Estimate Decreased  Decreased  


 


Platelet Morphology Normal  Normal  


 


Red Blood Cell Morphology Normal  Normal  


 


Sodium Level


  146 MMOL/L


(136-145) 143 MMOL/L


(136-145) 


 


 


Potassium Level


  4.0 MMOL/L


(3.5-5.1) 4.1 MMOL/L


(3.5-5.1) 


 


 


Chloride Level


  110 MMOL/L


() 107 MMOL/L


() 


 


 


Carbon Dioxide Level


  26 MMOL/L


(21-32) 30 MMOL/L


(21-32) 


 


 


Anion Gap


  10 mmol/L


(5-15) 6 mmol/L


(5-15) 


 


 


Blood Urea Nitrogen


  8 mg/dL (7-18) 


  16 mg/dL


(7-18) 


 


 


Creatinine


  0.4 MG/DL


(0.55-1.30) 0.5 MG/DL


(0.55-1.30) 


 


 


Estimat Glomerular Filtration


Rate > 60 mL/min


(>60) > 60 mL/min


(>60) 


 


 


Glucose Level


  160 MG/DL


() 178 MG/DL


() 


 


 


Calcium Level


  8.1 MG/DL


(8.5-10.1) 9.0 MG/DL


(8.5-10.1) 


 


 


Carcinoembryonic Antigen


  8.7 ng/mL


(0.0-4.7) 


  


 








Height (Feet):  5


Height (Inches):  4.00


Weight (Pounds):  135


Objective


Physical Exam


General: Awake and somnolent


HEENT: NC/AT. EOMI. dry mucous membranes


Cardiovascular: RRR.  S1 and S2 normal.


Resp: Normal work of breathing. No cough, wheezing or crackles appreciated


Abdomen: Abdomen is soft, nondistended


Skin: Intact.  No abrasions, laceration or rash over the exposed skin


MSK: Normal tone and bulk.


Neuro: Awake and alert.











Dmitry Levin MD Apr 19, 2020 09:27

## 2020-04-20 VITALS — DIASTOLIC BLOOD PRESSURE: 71 MMHG | SYSTOLIC BLOOD PRESSURE: 119 MMHG

## 2020-04-20 VITALS — DIASTOLIC BLOOD PRESSURE: 66 MMHG | SYSTOLIC BLOOD PRESSURE: 126 MMHG

## 2020-04-20 VITALS — DIASTOLIC BLOOD PRESSURE: 62 MMHG | SYSTOLIC BLOOD PRESSURE: 118 MMHG

## 2020-04-20 VITALS — SYSTOLIC BLOOD PRESSURE: 134 MMHG | DIASTOLIC BLOOD PRESSURE: 75 MMHG

## 2020-04-20 VITALS — SYSTOLIC BLOOD PRESSURE: 116 MMHG | DIASTOLIC BLOOD PRESSURE: 63 MMHG

## 2020-04-20 VITALS — DIASTOLIC BLOOD PRESSURE: 72 MMHG | SYSTOLIC BLOOD PRESSURE: 121 MMHG

## 2020-04-20 LAB
ADD MANUAL DIFF: NO
ALBUMIN SERPL-MCNC: 1.8 G/DL (ref 3.4–5)
ALBUMIN/GLOB SERPL: 0.4 {RATIO} (ref 1–2.7)
ALP SERPL-CCNC: 153 U/L (ref 46–116)
ALT SERPL-CCNC: 66 U/L (ref 12–78)
ANION GAP SERPL CALC-SCNC: 8 MMOL/L (ref 5–15)
AST SERPL-CCNC: 41 U/L (ref 15–37)
BASOPHILS NFR BLD AUTO: 0.3 % (ref 0–2)
BILIRUB SERPL-MCNC: 0.1 MG/DL (ref 0.2–1)
BUN SERPL-MCNC: 13 MG/DL (ref 7–18)
CALCIUM SERPL-MCNC: 8.1 MG/DL (ref 8.5–10.1)
CHLORIDE SERPL-SCNC: 106 MMOL/L (ref 98–107)
CO2 SERPL-SCNC: 26 MMOL/L (ref 21–32)
CREAT SERPL-MCNC: 0.5 MG/DL (ref 0.55–1.3)
EOSINOPHIL NFR BLD AUTO: 0 % (ref 0–3)
ERYTHROCYTE [DISTWIDTH] IN BLOOD BY AUTOMATED COUNT: 11.4 % (ref 11.6–14.8)
GAMMA GLUTAMYL TRANSPEPTIDASE: 156 U/L (ref 5–85)
GLOBULIN SER-MCNC: 4.4 G/DL
HCT VFR BLD CALC: 31.3 % (ref 37–47)
HGB BLD-MCNC: 11.1 G/DL (ref 12–16)
LYMPHOCYTES NFR BLD AUTO: 11.4 % (ref 20–45)
MCV RBC AUTO: 88 FL (ref 80–99)
MONOCYTES NFR BLD AUTO: 3.5 % (ref 1–10)
NEUTROPHILS NFR BLD AUTO: 84.8 % (ref 45–75)
PHOSPHATE SERPL-MCNC: 3.2 MG/DL (ref 2.5–4.9)
PLATELET # BLD: 182 K/UL (ref 150–450)
POTASSIUM SERPL-SCNC: 4.2 MMOL/L (ref 3.5–5.1)
RBC # BLD AUTO: 3.58 M/UL (ref 4.2–5.4)
SODIUM SERPL-SCNC: 140 MMOL/L (ref 136–145)
WBC # BLD AUTO: 9.8 K/UL (ref 4.8–10.8)

## 2020-04-20 RX ADMIN — INSULIN ASPART SCH UNITS: 100 INJECTION, SOLUTION INTRAVENOUS; SUBCUTANEOUS at 05:33

## 2020-04-20 RX ADMIN — VANCOMYCIN HYDROCHLORIDE SCH MG: 500 INJECTION, POWDER, LYOPHILIZED, FOR SOLUTION INTRAVENOUS at 20:18

## 2020-04-20 RX ADMIN — INSULIN ASPART SCH UNITS: 100 INJECTION, SOLUTION INTRAVENOUS; SUBCUTANEOUS at 20:23

## 2020-04-20 RX ADMIN — Medication SCH TAB: at 17:17

## 2020-04-20 RX ADMIN — Medication SCH MG: at 08:40

## 2020-04-20 RX ADMIN — Medication SCH MG: at 18:34

## 2020-04-20 RX ADMIN — SODIUM CHLORIDE SCH MLS/HR: 0.9 INJECTION INTRAVENOUS at 20:18

## 2020-04-20 RX ADMIN — DEXAMETHASONE SODIUM PHOSPHATE SCH MG: 4 INJECTION, SOLUTION INTRA-ARTICULAR; INTRALESIONAL; INTRAMUSCULAR; INTRAVENOUS; SOFT TISSUE at 17:17

## 2020-04-20 RX ADMIN — INSULIN ASPART SCH UNITS: 100 INJECTION, SOLUTION INTRAVENOUS; SUBCUTANEOUS at 11:50

## 2020-04-20 RX ADMIN — DEXAMETHASONE SODIUM PHOSPHATE SCH MG: 4 INJECTION, SOLUTION INTRA-ARTICULAR; INTRALESIONAL; INTRAMUSCULAR; INTRAVENOUS; SOFT TISSUE at 05:36

## 2020-04-20 RX ADMIN — INSULIN ASPART SCH UNITS: 100 INJECTION, SOLUTION INTRAVENOUS; SUBCUTANEOUS at 16:30

## 2020-04-20 RX ADMIN — INSULIN ASPART SCH UNITS: 100 INJECTION, SOLUTION INTRAVENOUS; SUBCUTANEOUS at 11:30

## 2020-04-20 RX ADMIN — DEXAMETHASONE SODIUM PHOSPHATE SCH MG: 4 INJECTION, SOLUTION INTRA-ARTICULAR; INTRALESIONAL; INTRAMUSCULAR; INTRAVENOUS; SOFT TISSUE at 12:29

## 2020-04-20 RX ADMIN — Medication SCH TAB: at 12:34

## 2020-04-20 RX ADMIN — Medication SCH TAB: at 08:39

## 2020-04-20 RX ADMIN — INSULIN ASPART SCH UNITS: 100 INJECTION, SOLUTION INTRAVENOUS; SUBCUTANEOUS at 17:19

## 2020-04-20 RX ADMIN — VANCOMYCIN HYDROCHLORIDE SCH MG: 500 INJECTION, POWDER, LYOPHILIZED, FOR SOLUTION INTRAVENOUS at 08:42

## 2020-04-20 RX ADMIN — VANCOMYCIN HYDROCHLORIDE SCH MG: 500 INJECTION, POWDER, LYOPHILIZED, FOR SOLUTION INTRAVENOUS at 12:34

## 2020-04-20 RX ADMIN — INSULIN DETEMIR SCH UNITS: 100 INJECTION, SOLUTION SUBCUTANEOUS at 20:22

## 2020-04-20 RX ADMIN — VANCOMYCIN HYDROCHLORIDE SCH MG: 500 INJECTION, POWDER, LYOPHILIZED, FOR SOLUTION INTRAVENOUS at 17:17

## 2020-04-20 NOTE — HEMATOLOGY/ONC PROGRESS NOTE
Assessment/Plan


Assessment/Plan


Assessment and Recs:


# Multilevel bone marrow signal abnormality, as described. Differential 

considerations include  metastatic neoplasm, infiltrative neoplasm such as 

myeloma or lymphoma, exuberant hematopoietic hyperplasia, less likely systemic 

metabolic disorders. Note that this is much more striking in the thoracic spine 

than it is in the lumbar and cervical segments study earlier


--> tumor markers have been ordered


--> spep and upep


--> bone marrow biopsy was done which shows a increase in Nk population, may be 

reactive v clonal process


--> also ordered for ct c/a/p with iv contrast - reviewed, no primary tumor. 

Slightly more conspicuous, since prior exam of one week earlier, pleural-based 

nodules in the right costophrenic sulcus. Apparent interim growth over short 

perio suggests that this is inflammatory or an area of atelectasis. Unchanged 3 

mm left lower lobe subpleural nodule along the major fissure.


--> r/o hemolysis at this time, DIC


--> dw pcp


# DKA (diabetic ketoacidoses)


--> as per endo, iss and accuchecks qac and qhs


--> hgb a1c goal <8, now 10.4


--> maintains on dexamethasone


# Hypernatremia


--> as per renal recs


--> continue ivfs


# UTI (urinary tract infection)


--> per id, on abx: ceftriaxone 


# Ground-level fall.


--> pt/ot as needed


# Nausea and vomiting.


--> zofran prn


# Right sided weakness


# Generalized Weakness


# SDH 3mm no midline shift


# Elevated d-dimer


# Tachycardia - resolved





Appreciate consultation and leoncio Rn





Subjective


Constitutional:  Denies: no symptoms, chills, fever, malaise, weakness, other


HEENT:  Denies: no symptoms, eye pain, blurred vision, tearing, double vision, 

ear pain, ear discharge, nose pain, nose congestion, throat pain, throat 

swelling, mouth pain, mouth swelling, other


Cardiovascular:  Denies: no symptoms, chest pain, edema, irregular heart rate, 

lightheadedness, palpitations, syncope, other


Respiratory:  Denies: no symptoms, cough, shortness of breath, SOB with 

excertion, SOB at rest, sputum, wheezing, other


Gastrointestinal/Abdominal:  Denies: no symptoms, abdomen distended, abdominal 

pain, black stools, tarry stools, blood in stool, constipated, diarrhea, 

difficulty swallowing, nausea, poor appetite, poor fluid intake, rectal bleeding

, vomiting, other


Genitourinary:  Denies: no symptoms, burning, discharge, frequency, flank pain, 

hematuria, incontinence, pain, urgency, other


Neurologic/Psychiatric:  Denies: no symptoms, anxiety, depressed, emotional 

problems, headache, numbness, paresthesia, pre-existing deficit, seizure, 

tingling, tremors, weakness, other


Endocrine:  Denies: no symptoms, excessive sweating, flushing, intolerance to 

cold, intolerance to heat, increased hunger, increased thirst, increased urine, 

unexplained weight gain, unexplained weight loss, other


Allergies:  


Coded Allergies:  


     No Known Allergies (Unverified , 4/15/15)


Subjective


4/17 cxr and labs reviewed, on room air, bp stable, bone marrow biopsy ordered 


4/19 med surg, no overnight events, ct and labs reviewed, on abx 


4/20 labs are noted, no bleeding hgb 11, plt much better





Objective


Objective





Current Medications








 Medications


  (Trade)  Dose


 Ordered  Sig/Meghan


 Route


 PRN Reason  Start Time


 Stop Time Status Last Admin


Dose Admin


 


 Acetaminophen


  (Tylenol)  650 mg  Q6H  PRN


 ORAL


 Mild Pain (Pain Scale 1-3)  4/17/20 10:00


 5/17/20 09:59   


 


 


 Ascorbic Acid


  (Vitamin C)  250 mg  TWICE A  DAY


 ORAL


   4/16/20 18:00


 5/15/20 17:59  4/20/20 08:40


 


 


 Atorvastatin


 Calcium


  (Lipitor)  10 mg  BEDTIME


 ORAL


   4/16/20 21:00


 7/10/20 20:59  4/19/20 22:01


 


 


 Ceftriaxone


 Sodium 1 gm/


 Dextrose  50 ml @ 


 100 mls/hr  Q24H


 IVPB


   4/16/20 21:00


 4/21/20 20:59  4/19/20 21:00


 


 


 Dexamethasone


 Sodium Phosphate


  (Decadron 4mg/ml


 vial)  4 mg  Q6HR


 IVP


   4/17/20 00:00


 7/16/20 00:00  4/20/20 12:29


 


 


 Dextrose


  (Dextrose 50%)  25 ml  Q30M  PRN


 IV


 Hypoglycemia  4/16/20 16:45


 7/9/20 21:44   


 


 


 Dextrose


  (Dextrose 50%)  50 ml  Q30M  PRN


 IV


 Hypoglycemia  4/16/20 16:45


 7/9/20 21:44   


 


 


 Insulin Aspart


  (NovoLOG)    AC+HS


 SUBQ


   4/16/20 21:00


 7/10/20 09:29  4/20/20 11:30


 


 


 Insulin Aspart


  (NovoLOG)  20 units  NOVOTIAC


 SUBQ


   4/20/20 11:50


 7/11/20 11:49  4/20/20 11:50


 


 


 Insulin Detemir


  (Levemir)  30 units  QHS


 SUBQ


   4/16/20 21:00


 7/11/20 20:59  4/19/20 22:03


 


 


 Lactobacillus


 Acidophilus


  (Culturelle)  1 tab  THREE TIMES A  DAY


 ORAL


   4/19/20 13:00


 7/18/20 12:59  4/20/20 12:34


 


 


 Levetiracetam


  (Keppra)  250 mg  Q12HR


 ORAL


   4/16/20 21:00


 5/29/20 20:59  4/20/20 08:39


 


 


 Lorazepam


  (Ativan)  1 mg  Q6H  PRN


 ORAL


 For Anxiety  4/17/20 08:30


 4/24/20 08:29   


 


 


 Metoprolol


 Tartrate


  (Lopressor)  25 mg  Q12HR


 ORAL


   4/16/20 21:00


 7/10/20 20:59  4/20/20 08:39


 


 


 Potassium Chloride


  (K-Dur)  40 meq  BID


 ORAL


   4/16/20 18:00


 7/12/20 09:14  4/20/20 08:40


 


 


 Tramadol HCl


  (Ultram)  25 mg  Q6H  PRN


 ORAL


 Severe Breakthru Pain (>7)  4/16/20 16:48


 4/23/20 16:47  4/19/20 22:01


 


 


 Vancomycin HCl


  (Firvanq)  125 mg  FOUR TIMES A  DAY


 ORAL


   4/16/20 18:00


 4/26/20 17:59  4/20/20 12:34


 











Last 24 Hour Vital Signs








  Date Time  Temp Pulse Resp B/P (MAP) Pulse Ox O2 Delivery O2 Flow Rate FiO2


 


4/20/20 12:00 98.3 79 18 121/72 (88) 94   


 


4/20/20 09:00      Room Air  


 


4/20/20 08:39  70  128/66    


 


4/20/20 08:00 96.4 78 18 118/62 (80) 94   


 


4/20/20 04:00 96.4 70 18 126/66 (86) 94   


 


4/20/20 00:00 97.3 76 22 134/75 (94) 96   


 


4/19/20 22:01  87  121/66    


 


4/19/20 21:00      Room Air  


 


4/19/20 20:00 97.4 87 20 121/66 (84) 98   


 


4/19/20 16:06 97.2 87 18 121/81 (94) 97   


 


4/19/20 12:06 98.1 98 18 127/79 (95) 96   


 


4/19/20 09:00      Room Air  


 


4/19/20 08:41  63  131/70    


 


4/19/20 08:00 98.7 63 18 131/70 (90) 94   


 


4/19/20 04:08 96.6 69 18 127/71 (89) 93   


 


4/19/20 00:00 97.0 71 22 133/72 (92) 96   


 


4/18/20 21:17  99  127/75    


 


4/18/20 21:00      Room Air  


 


4/18/20 20:00 96.4 99 20 127/75 (92) 95   


 


4/18/20 16:00 97.7 84 18 109/68 (82) 97   

















Intake and Output  


 


 4/19/20 4/20/20





 19:00 07:00


 


Intake Total 800 ml 360 ml


 


Output Total  500 ml


 


Balance 800 ml -140 ml


 


  


 


Other 800 ml 360 ml


 


Output Urine Total  500 ml


 


# Bowel Movements  2











Labs








Test


  4/18/20


05:25 4/19/20


06:45 4/20/20


05:50


 


White Blood Count


  6.9 K/UL


(4.8-10.8) 8.7 K/UL


(4.8-10.8) 9.8 K/UL


(4.8-10.8)


 


Red Blood Count


  3.82 M/UL


(4.20-5.40) 3.65 M/UL


(4.20-5.40) 3.58 M/UL


(4.20-5.40)


 


Hemoglobin


  11.9 G/DL


(12.0-16.0) 11.4 G/DL


(12.0-16.0) 11.1 G/DL


(12.0-16.0)


 


Hematocrit


  34.1 %


(37.0-47.0) 32.1 %


(37.0-47.0) 31.3 %


(37.0-47.0)


 


Mean Corpuscular Volume 89 FL (80-99)  88 FL (80-99)  88 FL (80-99) 


 


Mean Corpuscular Hemoglobin


  31.0 PG


(27.0-31.0) 31.2 PG


(27.0-31.0) 31.2 PG


(27.0-31.0)


 


Mean Corpuscular Hemoglobin


Concent 34.8 G/DL


(32.0-36.0) 35.5 G/DL


(32.0-36.0) 35.6 G/DL


(32.0-36.0)


 


Red Cell Distribution Width


  11.4 %


(11.6-14.8) 11.0 %


(11.6-14.8) 11.4 %


(11.6-14.8)


 


Platelet Count


  119 K/UL


(150-450) 151 K/UL


(150-450) 182 K/UL


(150-450)


 


Mean Platelet Volume


  7.8 FL


(6.5-10.1) 7.4 FL


(6.5-10.1) 7.2 FL


(6.5-10.1)


 


Neutrophils (%) (Auto)


   % (45.0-75.0) 


  84.0 %


(45.0-75.0) 84.8 %


(45.0-75.0)


 


Lymphocytes (%) (Auto)


   % (20.0-45.0) 


  12.8 %


(20.0-45.0) 11.4 %


(20.0-45.0)


 


Monocytes (%) (Auto)


   % (1.0-10.0) 


  3.0 %


(1.0-10.0) 3.5 %


(1.0-10.0)


 


Eosinophils (%) (Auto)


   % (0.0-3.0) 


  0.0 %


(0.0-3.0) 0.0 %


(0.0-3.0)


 


Basophils (%) (Auto)


   % (0.0-2.0) 


  0.2 %


(0.0-2.0) 0.3 %


(0.0-2.0)


 


Differential Total Cells


Counted 100 


  


  


 


 


Neutrophils % (Manual) 89 % (45-75)   


 


Lymphocytes % (Manual) 10 % (20-45)   


 


Monocytes % (Manual) 1 % (1-10)   


 


Eosinophils % (Manual) 0 % (0-3)   


 


Basophils % (Manual) 0 % (0-2)   


 


Band Neutrophils 0 % (0-8)   


 


Platelet Estimate Decreased   


 


Platelet Morphology Normal   


 


Red Blood Cell Morphology Normal   


 


Sodium Level


  143 MMOL/L


(136-145) 143 MMOL/L


(136-145) 140 MMOL/L


(136-145)


 


Potassium Level


  4.1 MMOL/L


(3.5-5.1) 4.0 MMOL/L


(3.5-5.1) 4.2 MMOL/L


(3.5-5.1)


 


Chloride Level


  107 MMOL/L


() 107 MMOL/L


() 106 MMOL/L


()


 


Carbon Dioxide Level


  30 MMOL/L


(21-32) 26 MMOL/L


(21-32) 26 MMOL/L


(21-32)


 


Anion Gap


  6 mmol/L


(5-15) 10 mmol/L


(5-15) 8 mmol/L


(5-15)


 


Blood Urea Nitrogen


  16 mg/dL


(7-18) 15 mg/dL


(7-18) 13 mg/dL


(7-18)


 


Creatinine


  0.5 MG/DL


(0.55-1.30) 0.4 MG/DL


(0.55-1.30) 0.5 MG/DL


(0.55-1.30)


 


Estimat Glomerular Filtration


Rate > 60 mL/min


(>60) > 60 mL/min


(>60) > 60 mL/min


(>60)


 


Glucose Level


  178 MG/DL


() 188 MG/DL


() 197 MG/DL


()


 


Calcium Level


  9.0 MG/DL


(8.5-10.1) 8.1 MG/DL


(8.5-10.1) 8.1 MG/DL


(8.5-10.1)


 


Thyroid Stimulating Hormone


(TSH) 


  0.099 uiU/mL


(0.358-3.740) 


 


 


Free Thyroxine


  


  1.14 NG/DL


(0.76-1.46) 


 


 


Uric Acid


  


  


  2.0 MG/DL


(2.6-7.2)


 


Phosphorus Level


  


  


  3.2 MG/DL


(2.5-4.9)


 


Magnesium Level


  


  


  2.2 MG/DL


(1.8-2.4)


 


Total Bilirubin


  


  


  0.1 MG/DL


(0.2-1.0)


 


Gamma Glutamyl Transpeptidase   156 U/L (5-85) 


 


Aspartate Amino Transf


(AST/SGOT) 


  


  41 U/L (15-37) 


 


 


Alanine Aminotransferase


(ALT/SGPT) 


  


  66 U/L (12-78) 


 


 


Alkaline Phosphatase


  


  


  153 U/L


()


 


C-Reactive Protein,


Quantitative 


  


  0.7 mg/dL


(0.00-0.90)


 


Pro-B-Type Natriuretic Peptide


  


  


  234 pg/mL


(0-125)


 


Total Protein


  


  


  6.2 G/DL


(6.4-8.2)


 


Albumin


  


  


  1.8 G/DL


(3.4-5.0)


 


Globulin   4.4 g/dL 


 


Albumin/Globulin Ratio   0.4 (1.0-2.7) 








Height (Feet):  5


Height (Inches):  4.00


Weight (Pounds):  135


Objective


Physical Exam


General: Awake and somnolent


HEENT: NC/AT. EOMI. dry mucous membranes


Cardiovascular: RRR.  S1 and S2 normal.


Resp: Normal work of breathing. No cough, wheezing or crackles appreciated


Abdomen: Abdomen is soft, nondistended


Skin: Intact.  No abrasions, laceration or rash over the exposed skin


MSK: Normal tone and bulk.


Neuro: Awake and alert.











Dmitry Levin MD Apr 20, 2020 14:27

## 2020-04-20 NOTE — NEPHROLOGY PROGRESS NOTE
Assessment/Plan


Problem List:  


(1) Hypernatremia


Assessment:  Improving





(2) UTI (urinary tract infection)


(3) Subdural hematoma


(4) DKA (diabetic ketoacidoses)


(5) Electrolyte imbalance


(6) C. difficile colitis


Assessment





Hypernatremia most likely due to free water deficit


Hypokalemia, hypophosphatemia


Hyperglycemia and DKA


Evidence of UTI


Right-sided weakness with 3 mm subdural hematoma found in CT scan


Plan





Patient has C. difficile colitis now


Lactobacillus added


Discontinue IV fluid


Discontinue Vincent catheter


Change Protonix to p.o.


Stop Neutra-Phos due to diarrhea





Potassium and phosphorus and magnesium supplement as needed


Diet started as the patient is more alert


Low dose of Lopressor


Monitor renal parameters and electrolytes


Neuro eval noted


Keep the blood pressure and blood sugar in check


Urine studies


Per orders





Subjective


ROS Limited/Unobtainable:  No


Constitutional:  Reports: malaise, weakness





Objective


Objective





Last 24 Hour Vital Signs








  Date Time  Temp Pulse Resp B/P (MAP) Pulse Ox O2 Delivery O2 Flow Rate FiO2


 


4/20/20 08:39  70  128/66    


 


4/20/20 04:00 96.4 70 18 126/66 (86) 94   


 


4/20/20 00:00 97.3 76 22 134/75 (94) 96   


 


4/19/20 22:01  87  121/66    


 


4/19/20 21:00      Room Air  


 


4/19/20 20:00 97.4 87 20 121/66 (84) 98   


 


4/19/20 16:06 97.2 87 18 121/81 (94) 97   


 


4/19/20 12:06 98.1 98 18 127/79 (95) 96   

















Intake and Output  


 


 4/19/20 4/20/20





 19:00 07:00


 


Intake Total 800 ml 360 ml


 


Output Total  500 ml


 


Balance 800 ml -140 ml


 


  


 


Other 800 ml 360 ml


 


Output Urine Total  500 ml


 


# Bowel Movements  2











Current Medications








 Medications


  (Trade)  Dose


 Ordered  Sig/Meghan


 Route


 PRN Reason  Start Time


 Stop Time Status Last Admin


Dose Admin


 


 Acetaminophen


  (Tylenol)  650 mg  Q6H  PRN


 ORAL


 Mild Pain (Pain Scale 1-3)  4/17/20 10:00


 5/17/20 09:59   


 


 


 Ascorbic Acid


  (Vitamin C)  250 mg  TWICE A  DAY


 ORAL


   4/16/20 18:00


 5/15/20 17:59  4/20/20 08:40


 


 


 Atorvastatin


 Calcium


  (Lipitor)  10 mg  BEDTIME


 ORAL


   4/16/20 21:00


 7/10/20 20:59  4/19/20 22:01


 


 


 Ceftriaxone


 Sodium 1 gm/


 Dextrose  50 ml @ 


 100 mls/hr  Q24H


 IVPB


   4/16/20 21:00


 4/21/20 20:59  4/19/20 21:00


 


 


 Dexamethasone


 Sodium Phosphate


  (Decadron 4mg/ml


 vial)  4 mg  Q6HR


 IVP


   4/17/20 00:00


 7/16/20 00:00  4/20/20 05:36


 


 


 Dextrose


  (Dextrose 50%)  25 ml  Q30M  PRN


 IV


 Hypoglycemia  4/16/20 16:45


 7/9/20 21:44   


 


 


 Dextrose


  (Dextrose 50%)  50 ml  Q30M  PRN


 IV


 Hypoglycemia  4/16/20 16:45


 7/9/20 21:44   


 


 


 Insulin Aspart


  (NovoLOG)    AC+HS


 SUBQ


   4/16/20 21:00


 7/10/20 09:29  4/20/20 05:33


 


 


 Insulin Aspart


  (NovoLOG)  20 units  NOVOTIAC


 SUBQ


   4/20/20 11:50


 7/11/20 11:49   


 


 


 Insulin Detemir


  (Levemir)  30 units  QHS


 SUBQ


   4/16/20 21:00


 7/11/20 20:59  4/19/20 22:03


 


 


 Lactobacillus


 Acidophilus


  (Culturelle)  1 tab  THREE TIMES A  DAY


 ORAL


   4/19/20 13:00


 7/18/20 12:59  4/20/20 08:39


 


 


 Levetiracetam


  (Keppra)  250 mg  Q12HR


 ORAL


   4/16/20 21:00


 5/29/20 20:59  4/20/20 08:39


 


 


 Lorazepam


  (Ativan)  1 mg  Q6H  PRN


 ORAL


 For Anxiety  4/17/20 08:30


 4/24/20 08:29   


 


 


 Metoprolol


 Tartrate


  (Lopressor)  25 mg  Q12HR


 ORAL


   4/16/20 21:00


 7/10/20 20:59  4/20/20 08:39


 


 


 Potassium Chloride


  (K-Dur)  40 meq  BID


 ORAL


   4/16/20 18:00


 7/12/20 09:14  4/20/20 08:40


 


 


 Tramadol HCl


  (Ultram)  25 mg  Q6H  PRN


 ORAL


 Severe Breakthru Pain (>7)  4/16/20 16:48


 4/23/20 16:47  4/19/20 22:01


 


 


 Vancomycin HCl


  (Firvanq)  125 mg  FOUR TIMES A  DAY


 ORAL


   4/16/20 18:00


 4/26/20 17:59  4/20/20 08:42


 





Laboratory Tests


4/20/20 05:50: 


White Blood Count 9.8, Red Blood Count 3.58L, Hemoglobin 11.1L, Hematocrit 31.3L

, Mean Corpuscular Volume 88, Mean Corpuscular Hemoglobin 31.2H, Mean 

Corpuscular Hemoglobin Concent 35.6, Red Cell Distribution Width 11.4L, 

Platelet Count 182, Mean Platelet Volume 7.2, Neutrophils (%) (Auto) 84.8H, 

Lymphocytes (%) (Auto) 11.4L, Monocytes (%) (Auto) 3.5, Eosinophils (%) (Auto) 

0.0, Basophils (%) (Auto) 0.3, Sodium Level 140, Potassium Level 4.2, Chloride 

Level 106, Carbon Dioxide Level 26, Anion Gap 8, Blood Urea Nitrogen 13, 

Creatinine 0.5L, Estimat Glomerular Filtration Rate > 60, Glucose Level 197H, 

Uric Acid 2.0L, Calcium Level 8.1L, Phosphorus Level 3.2, Magnesium Level 2.2, 

Total Bilirubin 0.1L, Gamma Glutamyl Transpeptidase 156H, Aspartate Amino 

Transf (AST/SGOT) 41H, Alanine Aminotransferase (ALT/SGPT) 66, Alkaline 

Phosphatase 153H, C-Reactive Protein, Quantitative 0.7, Pro-B-Type Natriuretic 

Peptide 234H, Total Protein 6.2L, Albumin 1.8L, Globulin 4.4, Albumin/Globulin 

Ratio 0.4L


Height (Feet):  5


Height (Inches):  4.00


Weight (Pounds):  135


General Appearance:  no apparent distress


Cardiovascular:  normal rate


Respiratory/Chest:  decreased breath sounds


Abdomen:  soft, distended


Objective


No change











Jonathan Jacobson MD Apr 20, 2020 10:25

## 2020-04-20 NOTE — GENERAL PROGRESS NOTE
Assessment/Plan


Assessment/Plan:


65-year-old female with PMH of medical noncompliance, DM type II who presents 

with b/l weakness, N/V, GLF, on admission pt found to be in DKA with 's.





#DKA


#E. coli UTI


#E. coli bacteremia


#Uncontrolled DM, Hgb A1C 10.4


#c. diff colitis


-continue in-patient medical care


-s/p DKA protocol with improved AG, AG 21-> 14


-CT abd reviewed


-4/10: UCx e. coli


-4/10: BCx w/E. coli 2/2, repeat 4/12 BCx NGTD


-c.diff positive, contact precautions


-cont. accuchecks, ISS qAC/HS


-Endo following: Levemir 30 units qhs, increase Novolog to 20 units ac tid


-ID, Dr. Looney: CTX, day #10/10 of abx, PO vanco for c diff day #7 of 

total 14 days





#Multilevel Thoracic BM enhancements, concern for malignancy


-suspicions for metastatic neoplasm, infiltrative neoplasm such as myeloma or 

lymphoma


-4/10: CT abd/pel w/o contrast reviewed with no abnormalities


-4/17 CT C/A/P without any lesions suspicious for primary malignancy


-CEA elevated


- wnl


-Follow up BM biopsy and PEP/UPEP results


-Heme/Onc following


-GI consulted for possible colonoscopy/EGD





#Right sided weakness


#Generalized Weakness


#SDH 3mm no midline shift


-weakness may be multifactorial, metabolic


-pt AOx3, pt noted fall 3 days ago


-found on CT head, repeat CT head stable


-4/14: CT head obtained given worsening weakness, stable compared to previous


-d/w Neuro, no need for transfer at this time, will decrease keppra


-cont. fall precautions


-holding ASA given SDH


-statin


-PT treat and eval


-d/w Neurology, Dr. Cheney, weakness does not appear to be neurologic at this 

time


-CM for d/c planning, plan for CRI on d/c





#Elevated d-dimer


#Tachycardia - resolved


-likely multifactorial given infection/DKA, PE has been ruled out


-EKG w/NSR


-CTA thorax negative for PE


-tachycardia likely 2/2 dehydration, encourage PO intake


-no OAC at this time





#Hypernatremia - resolved


#Hypokalemia - improved


#Hypophosphatemia


-Hypernatremia likely 2/2 free water deficit


-replace electrolytes, cont. to monitor and replace PRN


-bruner d/c'd


-Nephro following, IVF and electrolytes per nephro





DVT - SCDs for now given SDH





Time spent: 35 minutes on this patient's case, d/w pt POC, updated RN. D/w 

consulting MDs, RN, case management





Time of note may not reflect time of encounter.





Subjective


Allergies:  


Coded Allergies:  


     No Known Allergies (Unverified , 4/15/15)


Subjective


F/u DKA, UTI, hypernatremia, SDH, MRI enhancements on thoracic spine concerning 

for malignancy.


No acute events overnight. Pt remains weak in LE. States diarrhea is still 

present but improving.





Objective





Last 24 Hour Vital Signs








  Date Time  Temp Pulse Resp B/P (MAP) Pulse Ox O2 Delivery O2 Flow Rate FiO2


 


4/20/20 08:39  70  128/66    


 


4/20/20 04:00 96.4 70 18 126/66 (86) 94   


 


4/20/20 00:00 97.3 76 22 134/75 (94) 96   


 


4/19/20 22:01  87  121/66    


 


4/19/20 21:00      Room Air  


 


4/19/20 20:00 97.4 87 20 121/66 (84) 98   


 


4/19/20 16:06 97.2 87 18 121/81 (94) 97   


 


4/19/20 12:06 98.1 98 18 127/79 (95) 96   


 


4/19/20 09:00      Room Air  

















Intake and Output  


 


 4/19/20 4/20/20





 19:00 07:00


 


Intake Total 800 ml 360 ml


 


Output Total  500 ml


 


Balance 800 ml -140 ml


 


  


 


Other 800 ml 360 ml


 


Output Urine Total  500 ml


 


# Bowel Movements  2








Laboratory Tests


4/20/20 05:50: 


White Blood Count 9.8, Red Blood Count 3.58L, Hemoglobin 11.1L, Hematocrit 31.3L

, Mean Corpuscular Volume 88, Mean Corpuscular Hemoglobin 31.2H, Mean 

Corpuscular Hemoglobin Concent 35.6, Red Cell Distribution Width 11.4L, 

Platelet Count 182, Mean Platelet Volume 7.2, Neutrophils (%) (Auto) 84.8H, 

Lymphocytes (%) (Auto) 11.4L, Monocytes (%) (Auto) 3.5, Eosinophils (%) (Auto) 

0.0, Basophils (%) (Auto) 0.3, Sodium Level 140, Potassium Level 4.2, Chloride 

Level 106, Carbon Dioxide Level 26, Anion Gap 8, Blood Urea Nitrogen 13, 

Creatinine 0.5L, Estimat Glomerular Filtration Rate > 60, Glucose Level 197H, 

Uric Acid 2.0L, Calcium Level 8.1L, Phosphorus Level 3.2, Magnesium Level 2.2, 

Total Bilirubin 0.1L, Gamma Glutamyl Transpeptidase 156H, Aspartate Amino 

Transf (AST/SGOT) 41H, Alanine Aminotransferase (ALT/SGPT) 66, Alkaline 

Phosphatase 153H, C-Reactive Protein, Quantitative 0.7, Pro-B-Type Natriuretic 

Peptide 234H, Total Protein 6.2L, Albumin 1.8L, Globulin 4.4, Albumin/Globulin 

Ratio 0.4L


Height (Feet):  5


Height (Inches):  4.00


Weight (Pounds):  135


Objective


General: NAD, laying in bed comfortably, AAO x3


HEENT: NCAT, EOMi, mucous membranes moist


CV: RRR, no murmurs, rubs, or gallops


Pulm: CTAB, No wheezes, rhonchi, or rales, no accessory muscle usage


GI: Soft, nontender, nondistended, bowel sounds present


Neuro: CN II-XII grossly intact, sensation intact bilaterally, MS 3/5 in UE, 0/

5 LE B/L, able to wiggle toes


Ext: no LE edema, no bruising noted on rt lower ribs


MSK: reproducible pain on right lower ribs











Anitha Ochoa M.D. Apr 20, 2020 08:52

## 2020-04-20 NOTE — GENERAL PROGRESS NOTE
Assessment/Plan


Problem List:  


(1) C. difficile colitis


ICD Codes:  A04.72 - Enterocolitis due to Clostridium difficile, not specified 

as recurrent


SNOMED:  724914786


(2) Elevated CEA


ICD Codes:  R97.0 - Elevated carcinoembryonic antigen [CEA]


SNOMED:  163098131


(3) Subdural hematoma


ICD Codes:  S06.5X9A - Traumatic subdural hemorrhage with loss of consciousness 

of unspecified duration, initial encounter


SNOMED:  500647993


(4) UTI (urinary tract infection)


ICD Codes:  N39.0 - Urinary tract infection, site not specified


SNOMED:  50864214


(5) DKA (diabetic ketoacidoses)


ICD Codes:  E11.10 - Type 2 diabetes mellitus with ketoacidosis without coma


SNOMED:  461992478, 57261804


(6) Atrial fibrillation with rapid ventricular response


ICD Codes:  I48.91 - Unspecified atrial fibrillation


SNOMED:  158061771257464


(7) STEMI (ST elevation myocardial infarction)


ICD Codes:  I21.3 - ST elevation (STEMI) myocardial infarction of unspecified 

site


SNOMED:  951093176


(8) Malnutrition


ICD Codes:  E46 - Unspecified protein-calorie malnutrition


SNOMED:  13335529


Assessment/Plan:


on treatment for C.diff


? thoracic bone mets


elevated CEA


wt loss


anemia





plan EGD


colonoscopy when C.diff treatment completed


most likely as out patient





Subjective


ROS Limited/Unobtainable:  Yes


Allergies:  


Coded Allergies:  


     No Known Allergies (Unverified , 4/15/15)





Objective





Last 24 Hour Vital Signs








  Date Time  Temp Pulse Resp B/P (MAP) Pulse Ox O2 Delivery O2 Flow Rate FiO2


 


4/20/20 08:39  70  128/66    


 


4/20/20 04:00 96.4 70 18 126/66 (86) 94   


 


4/20/20 00:00 97.3 76 22 134/75 (94) 96   


 


4/19/20 22:01  87  121/66    


 


4/19/20 21:00      Room Air  


 


4/19/20 20:00 97.4 87 20 121/66 (84) 98   


 


4/19/20 16:06 97.2 87 18 121/81 (94) 97   


 


4/19/20 12:06 98.1 98 18 127/79 (95) 96   

















Intake and Output  


 


 4/19/20 4/20/20





 19:00 07:00


 


Intake Total 800 ml 360 ml


 


Output Total  500 ml


 


Balance 800 ml -140 ml


 


  


 


Other 800 ml 360 ml


 


Output Urine Total  500 ml


 


# Bowel Movements  2








Laboratory Tests


4/20/20 05:50: 


White Blood Count 9.8, Red Blood Count 3.58L, Hemoglobin 11.1L, Hematocrit 31.3L

, Mean Corpuscular Volume 88, Mean Corpuscular Hemoglobin 31.2H, Mean 

Corpuscular Hemoglobin Concent 35.6, Red Cell Distribution Width 11.4L, 

Platelet Count 182, Mean Platelet Volume 7.2, Neutrophils (%) (Auto) 84.8H, 

Lymphocytes (%) (Auto) 11.4L, Monocytes (%) (Auto) 3.5, Eosinophils (%) (Auto) 

0.0, Basophils (%) (Auto) 0.3, Sodium Level 140, Potassium Level 4.2, Chloride 

Level 106, Carbon Dioxide Level 26, Anion Gap 8, Blood Urea Nitrogen 13, 

Creatinine 0.5L, Estimat Glomerular Filtration Rate > 60, Glucose Level 197H, 

Uric Acid 2.0L, Calcium Level 8.1L, Phosphorus Level 3.2, Magnesium Level 2.2, 

Total Bilirubin 0.1L, Gamma Glutamyl Transpeptidase 156H, Aspartate Amino 

Transf (AST/SGOT) 41H, Alanine Aminotransferase (ALT/SGPT) 66, Alkaline 

Phosphatase 153H, C-Reactive Protein, Quantitative 0.7, Pro-B-Type Natriuretic 

Peptide 234H, Total Protein 6.2L, Albumin 1.8L, Globulin 4.4, Albumin/Globulin 

Ratio 0.4L


Height (Feet):  5


Height (Inches):  4.00


Weight (Pounds):  135


General Appearance:  alert


EENT:  normal ENT inspection


Neck:  normal alignment


Cardiovascular:  normal rate


Respiratory/Chest:  decreased breath sounds


Abdomen:  soft, hypoactive bowel sounds, tender


Extremities:  non-tender











Macario Gibson MD Apr 20, 2020 10:00

## 2020-04-20 NOTE — INFECTIOUS DISEASES PROG NOTE
Assessment/Plan


Assessment/Plan





ASSESSMENT AND PLAN:





1. e.coli uti/pyelonephritis with bacteremia, sepsis, fevers, gram neg sepsis 


    c.diff. +, diarrhea 





   - ceftriaxone - day # 10 abx, plan on 10 days treatment


   - po vancomycin - day # 7, plan on 14 days treatment (this would include 7 

days treatment post use of concomitant antibiotics) 


   - surveillance blood cultures negative 


   - monitor labs and temperatures 


   - sepsis improved 





2. DKA.


3. Hypernatremia.


4. Diabetes type 2.


5. Ground-level fall.


6. Nausea and vomiting.


7. Diarrhea.


8. We will check stool studies.


9. Noncompliance.


10. Right-sided weakness.


11. Subdural hematoma with midline shift.


12. No known drug allergies.


13. Social history negative.


14. Family history noncontributory.


15. MAR was noted.


16. Case discussed with RN.


17. Continue treatment per primary consultants.


18. Orders were noted and entered.





Subjective


Constitutional:  Reports: fatigue; Denies: fever


HEENT:  Denies: congestion


Respiratory:  Denies: shortness of breath


Cardiovascular:  Denies: chest pain


Gastrointestinal/Abdominal:  Reports: diarrhea; Denies: nausea, vomiting


Genitourinary:  Reports: other - no bruner


Neurologic:  Denies: headache


Psychiatric:  Denies: depression


Skin:  Denies: rash


Hematologic:  Denies: bleeding


Musculoskeletal:  Denies: pain


Allergies:  


Coded Allergies:  


     No Known Allergies (Unverified , 4/15/15)





Objective


Vital Signs





Last 24 Hour Vital Signs








  Date Time  Temp Pulse Resp B/P (MAP) Pulse Ox O2 Delivery O2 Flow Rate FiO2


 


4/20/20 19:53 97.2 80 20 116/63 (80) 94   


 


4/20/20 16:00 98.3 73 18 119/71 (87) 94   


 


4/20/20 12:00 98.3 79 18 121/72 (88) 94   


 


4/20/20 09:00      Room Air  


 


4/20/20 08:39  70  128/66    


 


4/20/20 08:00 96.4 78 18 118/62 (80) 94   


 


4/20/20 04:00 96.4 70 18 126/66 (86) 94   


 


4/20/20 00:00 97.3 76 22 134/75 (94) 96   


 


4/19/20 22:01  87  121/66    


 


4/19/20 21:00      Room Air  








Height (Feet):  5


Height (Inches):  4.00


Weight (Pounds):  135


General Appearance:  no acute distress


HEENT:  normocephalic, atraumatic, anicteric, mucous membranes moist


Respiratory/Chest:  lungs clear, normal breath sounds, no respiratory distress, 

no accessory muscle use


Cardiovascular:  normal rate, regular rhythm, no gallop/murmur, no JVD


Abdomen:  normal bowel sounds, soft, non tender, no organomegaly, non distended


Genitourinary:  other - no bruner


Extremities:  no cyanosis


Skin:  no rash


Neurologic/Psychiatric:  CNs II-XII grossly normal, alert, oriented x 3, 

responsive


Lymphatic:  no neck adenopathy


Musculoskeletal:  no effusion


Objective





CT chest - 





IMPRESSION:     


1.  No pulmonary embolus, somewhat limited at the bases due to motion.


2.  Bibasilar lung atelectasis/airspace disease.


3.  3 mm nodule along the right middle lobe fissure. 3.6 mm nodule left 


lower lobe along the fissure.  Fleischner Society Guidelines for low-risk 


patients, no follow-up is necessary.  For high-risk patients (smoking 


history or other known risk factors) an optional chest CT at 12 months 


could be performed.


4.  Mild bilateral perinephric stranding.  May be senescent, correlate 


for infection.


5.  Distended gallbladder with hyperdensity may be sludge or stones.


6.  Prominent left lobe of the liver is slightly nodular in appearance.





 





CT abdomen and pelvis:





IMPRESSION:     


1.  Urinary bladder distention.


2.  Mild heterogeneity of the lower pole of the left kidney may be 


artifactual.  Infection is not excluded.  Consider correlation with 


urinalysis, as clinically indicated.  Mild bilateral renal pelviectasis 


and prominence of bilateral ureters is likely related to distention of 


the urinary bladder.





Microbiology








 Date/Time


Source Procedure


Growth Status


 


 


 4/12/20 15:15


Blood Blood Culture - Final


NO GROWTH AFTER 5 DAYS Complete


 


 4/14/20 18:50


Stool Clostridium difficile Toxin Assay - Final Complete


 


 4/10/20 20:40


Urine,Clean Catch Urine Culture - Final


Escherichia Coli Complete











Laboratory Tests








Test


  4/20/20


05:50


 


White Blood Count


  9.8 K/UL


(4.8-10.8)


 


Red Blood Count


  3.58 M/UL


(4.20-5.40)  L


 


Hemoglobin


  11.1 G/DL


(12.0-16.0)  L


 


Hematocrit


  31.3 %


(37.0-47.0)  L


 


Mean Corpuscular Volume 88 FL (80-99)  


 


Mean Corpuscular Hemoglobin


  31.2 PG


(27.0-31.0)  H


 


Mean Corpuscular Hemoglobin


Concent 35.6 G/DL


(32.0-36.0)


 


Red Cell Distribution Width


  11.4 %


(11.6-14.8)  L


 


Platelet Count


  182 K/UL


(150-450)


 


Mean Platelet Volume


  7.2 FL


(6.5-10.1)


 


Neutrophils (%) (Auto)


  84.8 %


(45.0-75.0)  H


 


Lymphocytes (%) (Auto)


  11.4 %


(20.0-45.0)  L


 


Monocytes (%) (Auto)


  3.5 %


(1.0-10.0)


 


Eosinophils (%) (Auto)


  0.0 %


(0.0-3.0)


 


Basophils (%) (Auto)


  0.3 %


(0.0-2.0)


 


Sodium Level


  140 MMOL/L


(136-145)


 


Potassium Level


  4.2 MMOL/L


(3.5-5.1)


 


Chloride Level


  106 MMOL/L


()


 


Carbon Dioxide Level


  26 MMOL/L


(21-32)


 


Anion Gap


  8 mmol/L


(5-15)


 


Blood Urea Nitrogen


  13 mg/dL


(7-18)


 


Creatinine


  0.5 MG/DL


(0.55-1.30)  L


 


Estimat Glomerular Filtration


Rate > 60 mL/min


(>60)


 


Glucose Level


  197 MG/DL


()  H


 


Uric Acid


  2.0 MG/DL


(2.6-7.2)  L


 


Calcium Level


  8.1 MG/DL


(8.5-10.1)  L


 


Phosphorus Level


  3.2 MG/DL


(2.5-4.9)


 


Magnesium Level


  2.2 MG/DL


(1.8-2.4)


 


Total Bilirubin


  0.1 MG/DL


(0.2-1.0)  L


 


Gamma Glutamyl Transpeptidase


  156 U/L (5-85)


H


 


Aspartate Amino Transf


(AST/SGOT) 41 U/L (15-37)


H


 


Alanine Aminotransferase


(ALT/SGPT) 66 U/L (12-78)


 


 


Alkaline Phosphatase


  153 U/L


()  H


 


C-Reactive Protein,


Quantitative 0.7 mg/dL


(0.00-0.90)


 


Pro-B-Type Natriuretic Peptide


  234 pg/mL


(0-125)  H


 


Total Protein


  6.2 G/DL


(6.4-8.2)  L


 


Albumin


  1.8 G/DL


(3.4-5.0)  L


 


Globulin 4.4 g/dL  


 


Albumin/Globulin Ratio


  0.4 (1.0-2.7)


L











Current Medications








 Medications


  (Trade)  Dose


 Ordered  Sig/Meghan


 Route


 PRN Reason  Start Time


 Stop Time Status Last Admin


Dose Admin


 


 Acetaminophen


  (Tylenol)  650 mg  Q6H  PRN


 ORAL


 Mild Pain (Pain Scale 1-3)  4/17/20 10:00


 5/17/20 09:59   


 


 


 Ascorbic Acid


  (Vitamin C)  250 mg  TWICE A  DAY


 ORAL


   4/16/20 18:00


 5/15/20 17:59  4/20/20 18:34


 


 


 Atorvastatin


 Calcium


  (Lipitor)  10 mg  BEDTIME


 ORAL


   4/16/20 21:00


 7/10/20 20:59  4/19/20 22:01


 


 


 Ceftriaxone


 Sodium 1 gm/


 Dextrose  50 ml @ 


 100 mls/hr  Q24H


 IVPB


   4/16/20 21:00


 4/22/20 20:59  4/19/20 21:00


 


 


 Dexamethasone


 Sodium Phosphate


  (Decadron 4mg/ml


 vial)  4 mg  Q6HR


 IVP


   4/17/20 00:00


 7/16/20 00:00  4/20/20 17:17


 


 


 Dextrose


  (Dextrose 50%)  25 ml  Q30M  PRN


 IV


 Hypoglycemia  4/16/20 16:45


 7/9/20 21:44   


 


 


 Dextrose


  (Dextrose 50%)  50 ml  Q30M  PRN


 IV


 Hypoglycemia  4/16/20 16:45


 7/9/20 21:44   


 


 


 Insulin Aspart


  (NovoLOG)    AC+HS


 SUBQ


   4/16/20 21:00


 7/10/20 09:29  4/20/20 16:30


 


 


 Insulin Aspart


  (NovoLOG)  20 units  NOVOTIAC


 SUBQ


   4/20/20 11:50


 7/11/20 11:49  4/20/20 17:19


 


 


 Insulin Detemir


  (Levemir)  30 units  QHS


 SUBQ


   4/16/20 21:00


 7/11/20 20:59  4/19/20 22:03


 


 


 Lactobacillus


 Acidophilus


  (Culturelle)  1 tab  THREE TIMES A  DAY


 ORAL


   4/19/20 13:00


 7/18/20 12:59  4/20/20 17:17


 


 


 Levetiracetam


  (Keppra)  250 mg  Q12HR


 ORAL


   4/16/20 21:00


 5/29/20 20:59  4/20/20 08:39


 


 


 Lorazepam


  (Ativan)  1 mg  Q6H  PRN


 ORAL


 For Anxiety  4/17/20 08:30


 4/24/20 08:29   


 


 


 Metoprolol


 Tartrate


  (Lopressor)  25 mg  Q12HR


 ORAL


   4/16/20 21:00


 7/10/20 20:59  4/20/20 08:39


 


 


 Potassium Chloride


  (K-Dur)  40 meq  BID


 ORAL


   4/16/20 18:00


 7/12/20 09:14  4/20/20 17:17


 


 


 Tramadol HCl


  (Ultram)  25 mg  Q6H  PRN


 ORAL


 Severe Breakthru Pain (>7)  4/16/20 16:48


 4/23/20 16:47  4/19/20 22:01


 


 


 Vancomycin HCl


  (Firvanq)  125 mg  FOUR TIMES A  DAY


 ORAL


   4/16/20 18:00


 4/26/20 17:59  4/20/20 17:17


 

















Brijesh Looney MD Apr 20, 2020 20:06

## 2020-04-20 NOTE — GENERAL PROGRESS NOTE
Assessment/Plan


Problem List:  


(1) Abnormal thyroid blood test


ICD Codes:  R79.89 - Other specified abnormal findings of blood chemistry


SNOMED:  900777846, [id number]


(2) Diabetes mellitus out of control


ICD Codes:  E11.65 - Type 2 diabetes mellitus with hyperglycemia


SNOMED:  71652165, 025319623


(3) DKA (diabetic ketoacidoses)


ICD Codes:  E11.10 - Type 2 diabetes mellitus with ketoacidosis without coma


SNOMED:  927392870, 08270882


(4) STEMI (ST elevation myocardial infarction)


ICD Codes:  I21.3 - ST elevation (STEMI) myocardial infarction of unspecified 

site


SNOMED:  979491313


(5) Prolonged Q-T interval on ECG


ICD Codes:  R94.31 - Abnormal electrocardiogram [ECG] [EKG]


SNOMED:  796789820


(6) Atrial fibrillation with rapid ventricular response


ICD Codes:  I48.91 - Unspecified atrial fibrillation


SNOMED:  506763968561369


(7) Hypernatremia


ICD Codes:  E87.0 - Hyperosmolality and hypernatremia


SNOMED:  117338040, 64822775


Assessment/Plan:


continue Levemir 30 units qhs 


increase Novolog to 20 units ac tid 


continue NISS ac / hs 





TSH still suppressed, free T4 normalized 


continue to hold thyroxine





Subjective


ROS Limited/Unobtainable:  Yes


Allergies:  


Coded Allergies:  


     No Known Allergies (Unverified , 4/15/15)


Subjective


events noted 


interval notes reviewed 


mealtime glucose is elevated 


she is on dexa 4 mg every 6 hours 











Item Value  Date Time


 


Bedside Blood Glucose 228 mg/dl H 4/20/20 0533


 


Bedside Blood Glucose 217 mg/dl H 4/19/20 2204


 


Bedside Blood Glucose 233 mg/dl H 4/19/20 1736


 


Bedside Blood Glucose 300 mg/dl H 4/19/20 1240


 


Bedside Blood Glucose 184 mg/dl H 4/19/20 0624











Objective





Last 24 Hour Vital Signs








  Date Time  Temp Pulse Resp B/P (MAP) Pulse Ox O2 Delivery O2 Flow Rate FiO2


 


4/20/20 04:00 96.4 70 18 126/66 (86) 94   


 


4/20/20 00:00 97.3 76 22 134/75 (94) 96   


 


4/19/20 22:01  87  121/66    


 


4/19/20 21:00      Room Air  


 


4/19/20 20:00 97.4 87 20 121/66 (84) 98   


 


4/19/20 16:06 97.2 87 18 121/81 (94) 97   


 


4/19/20 12:06 98.1 98 18 127/79 (95) 96   


 


4/19/20 09:00      Room Air  


 


4/19/20 08:41  63  131/70    

















Intake and Output  


 


 4/19/20 4/20/20





 19:00 07:00


 


Intake Total 800 ml 360 ml


 


Output Total  500 ml


 


Balance 800 ml -140 ml


 


  


 


Other 800 ml 360 ml


 


Output Urine Total  500 ml


 


# Bowel Movements  2








Laboratory Tests


4/20/20 05:50: 


White Blood Count 9.8, Red Blood Count 3.58L, Hemoglobin 11.1L, Hematocrit 31.3L

, Mean Corpuscular Volume 88, Mean Corpuscular Hemoglobin 31.2H, Mean 

Corpuscular Hemoglobin Concent 35.6, Red Cell Distribution Width 11.4L, 

Platelet Count 182, Mean Platelet Volume 7.2, Neutrophils (%) (Auto) 84.8H, 

Lymphocytes (%) (Auto) 11.4L, Monocytes (%) (Auto) 3.5, Eosinophils (%) (Auto) 

0.0, Basophils (%) (Auto) 0.3, Sodium Level 140, Potassium Level 4.2, Chloride 

Level 106, Carbon Dioxide Level 26, Anion Gap 8, Blood Urea Nitrogen 13, 

Creatinine 0.5L, Estimat Glomerular Filtration Rate > 60, Glucose Level 197H, 

Uric Acid 2.0L, Calcium Level 8.1L, Phosphorus Level 3.2, Magnesium Level 2.2, 

Total Bilirubin 0.1L, Gamma Glutamyl Transpeptidase 156H, Aspartate Amino 

Transf (AST/SGOT) 41H, Alanine Aminotransferase (ALT/SGPT) 66, Alkaline 

Phosphatase 153H, C-Reactive Protein, Quantitative 0.7, Pro-B-Type Natriuretic 

Peptide 234H, Total Protein 6.2L, Albumin 1.8L, Globulin 4.4, Albumin/Globulin 

Ratio 0.4L


Height (Feet):  5


Height (Inches):  4.00


Weight (Pounds):  135


General Appearance:  no apparent distress


Neck:  normal alignment


Cardiovascular:  normal rate


Respiratory/Chest:  lungs clear


Abdomen:  normal bowel sounds


Pelvis:  normal external exam


Objective





Current Medications








 Medications


  (Trade)  Dose


 Ordered  Sig/Meghan


 Route


 PRN Reason  Start Time


 Stop Time Status Last Admin


Dose Admin


 


 Acetaminophen


  (Tylenol)  650 mg  Q6H  PRN


 ORAL


 Mild Pain (Pain Scale 1-3)  4/17/20 10:00


 5/17/20 09:59   


 


 


 Ascorbic Acid


  (Vitamin C)  250 mg  TWICE A  DAY


 ORAL


   4/16/20 18:00


 5/15/20 17:59  4/19/20 17:26


 


 


 Atorvastatin


 Calcium


  (Lipitor)  10 mg  BEDTIME


 ORAL


   4/16/20 21:00


 7/10/20 20:59  4/19/20 22:01


 


 


 Ceftriaxone


 Sodium 1 gm/


 Dextrose  50 ml @ 


 100 mls/hr  Q24H


 IVPB


   4/16/20 21:00


 4/21/20 20:59  4/19/20 21:00


 


 


 Dexamethasone


 Sodium Phosphate


  (Decadron 4mg/ml


 vial)  4 mg  Q6HR


 IVP


   4/17/20 00:00


 7/16/20 00:00  4/20/20 05:36


 


 


 Dextrose


  (Dextrose 50%)  25 ml  Q30M  PRN


 IV


 Hypoglycemia  4/16/20 16:45


 7/9/20 21:44   


 


 


 Dextrose


  (Dextrose 50%)  50 ml  Q30M  PRN


 IV


 Hypoglycemia  4/16/20 16:45


 7/9/20 21:44   


 


 


 Insulin Aspart


  (NovoLOG)    AC+HS


 SUBQ


   4/16/20 21:00


 7/10/20 09:29  4/20/20 05:33


 


 


 Insulin Aspart


  (NovoLOG)  16 units  NOVOTIAC


 SUBQ


   4/19/20 11:50


 7/11/20 11:49  4/20/20 05:33


 


 


 Insulin Detemir


  (Levemir)  30 units  QHS


 SUBQ


   4/16/20 21:00


 7/11/20 20:59  4/19/20 22:03


 


 


 Lactobacillus


 Acidophilus


  (Culturelle)  1 tab  THREE TIMES A  DAY


 ORAL


   4/19/20 13:00


 7/18/20 12:59  4/19/20 17:26


 


 


 Levetiracetam


  (Keppra)  250 mg  Q12HR


 ORAL


   4/16/20 21:00


 5/29/20 20:59  4/19/20 22:01


 


 


 Lorazepam


  (Ativan)  1 mg  Q6H  PRN


 ORAL


 For Anxiety  4/17/20 08:30


 4/24/20 08:29   


 


 


 Metoprolol


 Tartrate


  (Lopressor)  25 mg  Q12HR


 ORAL


   4/16/20 21:00


 7/10/20 20:59  4/19/20 22:01


 


 


 Pantoprazole


  (Protonix)  40 mg  BID


 ORAL


   4/16/20 18:00


 5/14/20 08:59  4/19/20 17:26


 


 


 Potassium Chloride


  (K-Dur)  40 meq  BID


 ORAL


   4/16/20 18:00


 7/12/20 09:14  4/19/20 17:26


 


 


 Tramadol HCl


  (Ultram)  25 mg  Q6H  PRN


 ORAL


 Severe Breakthru Pain (>7)  4/16/20 16:48


 4/23/20 16:47  4/19/20 22:01


 


 


 Vancomycin HCl


  (Firvanq)  125 mg  FOUR TIMES A  DAY


 ORAL


   4/16/20 18:00


 4/26/20 17:59  4/19/20 22:00


 

















Kevin Mohr MD Apr 20, 2020 08:18

## 2020-04-20 NOTE — NUR
*-* INSURANCE *-*



UPDATED CLINICALS AND REVIEWS HAVE BEEN FAXED TO:



[facility]: ROBERT WAITE

T: [phone] (LEAVE A MESSAGE)

F: [phone]

## 2020-04-20 NOTE — NUR
NURSE NOTES:

Received report from DEREK Reid. AAO x 3, on room air. Speaking Upper sorbian and Portuguese. IV sites 
intact and patent. No labored breathing. No acute distress noted. Aspiration precaution 
maintained. HOB elevated. Side rails padded. Bed locked, lowest position, alarm on, side 
rails up, call light within reach. Will continue to monitor.

## 2020-04-20 NOTE — NUR
NURSE NOTES:

pt awake alert, no distress. no sob. no c/o pain. call light within reach. will monitor. 
kept clean dry and comfortable. bed alarm on.

## 2020-04-20 NOTE — NEUROLOGY PROGRESS NOTE
Interim History


Interim History


ROS Limited/Unobtainable:  No


Interim History


some movement in LE distally





Objective


Physical Exam





Last Vital Signs








  Date Time  Temp Pulse Resp B/P (MAP) Pulse Ox O2 Delivery O2 Flow Rate FiO2


 


4/20/20 20:19  80  116/63    


 


4/20/20 19:53 97.2  20  94   


 


4/20/20 09:00      Room Air  











Laboratory Tests








Test


  4/20/20


05:50


 


White Blood Count


  9.8 K/UL


(4.8-10.8)


 


Red Blood Count


  3.58 M/UL


(4.20-5.40)  L


 


Hemoglobin


  11.1 G/DL


(12.0-16.0)  L


 


Hematocrit


  31.3 %


(37.0-47.0)  L


 


Mean Corpuscular Volume 88 FL (80-99)  


 


Mean Corpuscular Hemoglobin


  31.2 PG


(27.0-31.0)  H


 


Mean Corpuscular Hemoglobin


Concent 35.6 G/DL


(32.0-36.0)


 


Red Cell Distribution Width


  11.4 %


(11.6-14.8)  L


 


Platelet Count


  182 K/UL


(150-450)


 


Mean Platelet Volume


  7.2 FL


(6.5-10.1)


 


Neutrophils (%) (Auto)


  84.8 %


(45.0-75.0)  H


 


Lymphocytes (%) (Auto)


  11.4 %


(20.0-45.0)  L


 


Monocytes (%) (Auto)


  3.5 %


(1.0-10.0)


 


Eosinophils (%) (Auto)


  0.0 %


(0.0-3.0)


 


Basophils (%) (Auto)


  0.3 %


(0.0-2.0)


 


Sodium Level


  140 MMOL/L


(136-145)


 


Potassium Level


  4.2 MMOL/L


(3.5-5.1)


 


Chloride Level


  106 MMOL/L


()


 


Carbon Dioxide Level


  26 MMOL/L


(21-32)


 


Anion Gap


  8 mmol/L


(5-15)


 


Blood Urea Nitrogen


  13 mg/dL


(7-18)


 


Creatinine


  0.5 MG/DL


(0.55-1.30)  L


 


Estimat Glomerular Filtration


Rate > 60 mL/min


(>60)


 


Glucose Level


  197 MG/DL


()  H


 


Uric Acid


  2.0 MG/DL


(2.6-7.2)  L


 


Calcium Level


  8.1 MG/DL


(8.5-10.1)  L


 


Phosphorus Level


  3.2 MG/DL


(2.5-4.9)


 


Magnesium Level


  2.2 MG/DL


(1.8-2.4)


 


Total Bilirubin


  0.1 MG/DL


(0.2-1.0)  L


 


Gamma Glutamyl Transpeptidase


  156 U/L (5-85)


H


 


Aspartate Amino Transf


(AST/SGOT) 41 U/L (15-37)


H


 


Alanine Aminotransferase


(ALT/SGPT) 66 U/L (12-78)


 


 


Alkaline Phosphatase


  153 U/L


()  H


 


C-Reactive Protein,


Quantitative 0.7 mg/dL


(0.00-0.90)


 


Pro-B-Type Natriuretic Peptide


  234 pg/mL


(0-125)  H


 


Total Protein


  6.2 G/DL


(6.4-8.2)  L


 


Albumin


  1.8 G/DL


(3.4-5.0)  L


 


Globulin 4.4 g/dL  


 


Albumin/Globulin Ratio


  0.4 (1.0-2.7)


L








Head:  normocophalic


Neck:  no rigidity


EENT:  benign





Neurologic Exam


Mental Status:  awake


Speech:  normal speech


Language:  normal language


Cranial Nerve VII:  no facial asymmetry


Objective


alert, oriented x 2, conversant


UEs 5/5


LEs 1/5 flaccid 


cc 35 min





Impression/Recommendations


Problems:  


(1) STEMI (ST elevation myocardial infarction)


(2) Atrial fibrillation with rapid ventricular response


(3) Prolonged Q-T interval on ECG


(4) DKA (diabetic ketoacidoses)


(5) Hypernatremia


(6) UTI (urinary tract infection)


(7) Subdural hematoma


Diagnostic Impression





Given LE weakness , suspect myelopathy, mri cervical thoracic and lumbar ordered

- called radiology to expedite, imaging still not in system to review





 keppra to 250 mg bid


SBP < 150


PT OT


consider Osmin Chao MD Apr 20, 2020 21:00

## 2020-04-20 NOTE — NUR
CASE MANAGEMENT:REVIEW



4/18/2020



SI;DIABETIC KETOACIDOSIS. SEPSIS D/T E COLI BACTEREMIA AND UTI. 

BACTEREMIA. C-DIFF.

96.4   99   20   107/64   95% ON RA

CR 0.4      



IS;VANCOMYCIN PO QID

ROCEPHIN IV Q24 HRS

DECADRON IV Q6 HRS

KEPPRA PO Q12 HRS

K-DUR PO BID

INSULIN LEVEMIR

INSULIN NOVOLOG

PROTONIX PO BID



*****MED SURG STATUS*****

DCP;FROM HOME

PLAN TO DISCHARGE TO [facility]



********************************************************************************



CASE MANAGEMENT:REVIEW



4/19/2020



SI;DIABETIC KETOACIDOSIS. SEPSIS D/T E COLI BACTEREMIA AND UTI. 

BACTEREMIA. C-DIFF. BONE MARROW SIGNAL ABNORMALITY.

96.699   22   133/72   93% ON RA

CR 0.4     CA 8.1   TSH 0.099



IS;VANCOMYCIN PO QID

ROCEPHIN IV Q24 HRS

DECADRON IV Q6 HRS

KEPPRA PO Q12 HRS

K-DUR PO BID

INSULIN LEVEMIR

INSULIN NOVOLOG

CULTURELLE  PO TID



*****MED SURG STATUS*****

DCP;FROM HOME

PLAN TO DISCHARGE TO [facility]



PLAN;BONE MARROW BIOPSY

CT W/CONTRAST



**************************************************************************



CASE MANAGEMENT:REVIEW



4/20/2020



SI;DIABETIC KETOACIDOSIS. SEPSIS D/T E COLI BACTEREMIA AND UTI. 

BACTEREMIA. C-DIFF.  BONE MARROW SIGNAL ABNORMALITY.

96.4   79   22   134/75   94% ON RA

CR 0.5      CA 8.1      AST 41   ALK PHOS 153   ALB 1.8  

BONE MARROW BIOPSY ~ bone marrow biopsy was done which shows a increase in Nk population, 
may be reactive v clonal process



IS;VANCOMYCIN PO QID

ROCEPHIN IV Q24 HRS

DECADRON IV Q6 HRS

KEPPRA PO Q12 HRS

K-DUR PO BID

INSULIN LEVEMIR

INSULIN NOVOLOG

CULTURELLE  PO TID



*****MED SURG STATUS*****

DCP;FROM HOME

PLAN TO DISCHARGE TO [facility]

## 2020-04-20 NOTE — NUR
WEEKLY PROGRESS REPORT AND DISCHARGE:



Goal Met: Pt is able to meet >75% of nutrition/hydration via PO intake. 

Goal Met: Staff is aware of aspiration precautions, risks, and safe swallow strategies. 

Current Diet: Puree with Thin Liquids. 

MBSS not completed as not indicated at this time. 



Pt seen at bedside w/ meal tray, speaks Chinese and Mohawk. Pt is able to demonstrate 
understanding of safe swallow compensatory strategies and aspiration precautions. 



Pt reports preference for puree solids despite SLP's education on various textures. Pt 
completed PO trials of thin liquids via teaspoon, cup, and straw w/ no overt s/s of 
aspiration. 



SLP recommends upgrading Pt to thin liquids, continuing Puree solids due to Pt preference. 
RN and Pt aware of aspiration risks and safe swallow strategies. 



AT THIS TIME, PT IS ON PT PREFERRED DIET CONSISTENCY OF PUREE SOLIDS W/ THIN LIQUIDS AND NO 
FURTHER SLP INTERVENTION INDICATED AT THIS TIME.



PT WOULD BENEFIT FROM SLP AT NEXT LEVEL OF CARE TO RE-ASSESS PT'S SWALLOWING SAFETY AND 
FUNCTION TO DETERMINE APPROPRIATENESS FOR DIET CONSISTENCY UPGRADE TO LEAST RESTRICTIVE 
DIET.



Discontinue SLP intervention at this time as all goals met.

## 2020-04-20 NOTE — SURGERY PROGRESS NOTE
Surgery Progress Note


Subjective


Additional Comments


path noted


exam stable


eating well


in good spirits





Objective





Last 24 Hour Vital Signs








  Date Time  Temp Pulse Resp B/P (MAP) Pulse Ox O2 Delivery O2 Flow Rate FiO2


 


4/20/20 08:39  70  128/66    


 


4/20/20 04:00 96.4 70 18 126/66 (86) 94   


 


4/20/20 00:00 97.3 76 22 134/75 (94) 96   


 


4/19/20 22:01  87  121/66    


 


4/19/20 21:00      Room Air  


 


4/19/20 20:00 97.4 87 20 121/66 (84) 98   


 


4/19/20 16:06 97.2 87 18 121/81 (94) 97   


 


4/19/20 12:06 98.1 98 18 127/79 (95) 96   








I&O











Intake and Output  


 


 4/19/20 4/20/20





 19:00 07:00


 


Intake Total 800 ml 360 ml


 


Output Total  500 ml


 


Balance 800 ml -140 ml


 


  


 


Other 800 ml 360 ml


 


Output Urine Total  500 ml


 


# Bowel Movements  2








Dressing:  other


Wound:  other


Drains:  other


Cardiovascular:  RSR


Respiratory:  decreased breath sounds


Abdomen:  soft, non-tender, present bowel sounds


Extremities:  no tenderness, no cyanosis





Laboratory Tests








Test


  4/20/20


05:50


 


White Blood Count


  9.8 K/UL


(4.8-10.8)


 


Red Blood Count


  3.58 M/UL


(4.20-5.40)  L


 


Hemoglobin


  11.1 G/DL


(12.0-16.0)  L


 


Hematocrit


  31.3 %


(37.0-47.0)  L


 


Mean Corpuscular Volume 88 FL (80-99)  


 


Mean Corpuscular Hemoglobin


  31.2 PG


(27.0-31.0)  H


 


Mean Corpuscular Hemoglobin


Concent 35.6 G/DL


(32.0-36.0)


 


Red Cell Distribution Width


  11.4 %


(11.6-14.8)  L


 


Platelet Count


  182 K/UL


(150-450)


 


Mean Platelet Volume


  7.2 FL


(6.5-10.1)


 


Neutrophils (%) (Auto)


  84.8 %


(45.0-75.0)  H


 


Lymphocytes (%) (Auto)


  11.4 %


(20.0-45.0)  L


 


Monocytes (%) (Auto)


  3.5 %


(1.0-10.0)


 


Eosinophils (%) (Auto)


  0.0 %


(0.0-3.0)


 


Basophils (%) (Auto)


  0.3 %


(0.0-2.0)


 


Sodium Level


  140 MMOL/L


(136-145)


 


Potassium Level


  4.2 MMOL/L


(3.5-5.1)


 


Chloride Level


  106 MMOL/L


()


 


Carbon Dioxide Level


  26 MMOL/L


(21-32)


 


Anion Gap


  8 mmol/L


(5-15)


 


Blood Urea Nitrogen


  13 mg/dL


(7-18)


 


Creatinine


  0.5 MG/DL


(0.55-1.30)  L


 


Estimat Glomerular Filtration


Rate > 60 mL/min


(>60)


 


Glucose Level


  197 MG/DL


()  H


 


Uric Acid


  2.0 MG/DL


(2.6-7.2)  L


 


Calcium Level


  8.1 MG/DL


(8.5-10.1)  L


 


Phosphorus Level


  3.2 MG/DL


(2.5-4.9)


 


Magnesium Level


  2.2 MG/DL


(1.8-2.4)


 


Total Bilirubin


  0.1 MG/DL


(0.2-1.0)  L


 


Gamma Glutamyl Transpeptidase


  156 U/L (5-85)


H


 


Aspartate Amino Transf


(AST/SGOT) 41 U/L (15-37)


H


 


Alanine Aminotransferase


(ALT/SGPT) 66 U/L (12-78)


 


 


Alkaline Phosphatase


  153 U/L


()  H


 


C-Reactive Protein,


Quantitative 0.7 mg/dL


(0.00-0.90)


 


Pro-B-Type Natriuretic Peptide


  234 pg/mL


(0-125)  H


 


Total Protein


  6.2 G/DL


(6.4-8.2)  L


 


Albumin


  1.8 G/DL


(3.4-5.0)  L


 


Globulin 4.4 g/dL  


 


Albumin/Globulin Ratio


  0.4 (1.0-2.7)


L











Plan


Problems:  


(1) Decubitus skin ulcer


Assessment & Plan:  Patient noted to have rapid onset Sacral DTPI. Per Primary 

Nurse pt noted on prior shift to have non-blanching erythema as per report.  

All preventive measures were taken to reposition patient and off-load pressure.

  Despite repositioning and applying Moisture Barrier Paste patient noted to 

have further skin decline. 


Sacral DTPI is irregular shaped, Purple and indurated in colour over 

sacrococcygeal with surrounding non-blanching erythema along borders.(L)7.1cm x 

(W)6.5cm. 


Nutritional intake varies 30-45% daily. 


Non-Blanching erythema without fluctuance noted to R and L heels.


Both heels observed floated with pillows Off mattress.





Tx.Plan: 


Apply Moisture Barrier Paste to Sacrum. Cover with Optifoam drsg. Change every 

3 days and prn.


            


Apply Cavilon Skin Barrier to both heels. Cover each heel with Optifoam drsg. 

Change every 7 days and prn.


            


Reposition at least every 2hours or as tolerated.


            


Off-load heels with pillow.


            


APM/VIK Mattress overlay.





MRI spine noted


neuro input appreciated 





(2) Deep tissue injury


(3) Malnutrition


Assessment & Plan:  DAILY ESTIMATED NEEDS:


Needs based on DM 61.5kg  


25-30  kcals/kg 


1024-1402  total kcals


1.25-1.5  g protein/kg


77-92  g total protein 


25-30  mL/kg


0449-7579  total fluid mLs





NUTRITION DIAGNOSIS:


Altered nutrition related lab values r/t DKA, clinical status as evidenced


by A1C 10.4,  on adm, (+) acetone, now w/ critically elev Na(161*)


and low K(2.4*).





[facility]     





PO DIET RECOMMENDATIONS:


[facility] diet/ texture per SLP  





ADDITIONAL RECOMMENDATIONS:


1) W/ variable po intake add Glucerna 1 tetra per day


    Add high pro/1 carb snacks in b/w meals   


2) On D5 for hydration, monitor BG  


3) Wound care: Add JONATHAN BID + Vit C 250mg daily (f/up w/ WC eval) 


4) Obtain a calibrated bed scale wts 


    EMR wt: 110#      vs        Bed scale wt: 135# 





(4) Abnormal thyroid blood test


(5) Electrolyte imbalance


Assessment & Plan:  1.  No pulmonary embolus, somewhat limited at the bases due 

to motion.


2.  Bibasilar lung atelectasis/airspace disease.


3.  3 mm nodule along the right middle lobe fissure. 3.6 mm nodule left 


lower lobe along the fissure.  Fleischner Society Guidelines for low-risk 


patients, no follow-up is necessary.  For high-risk patients (smoking 


history or other known risk factors) an optional chest CT at 12 months 


could be performed.


4.  Mild bilateral perinephric stranding.  May be senescent, correlate 


for infection.


5.  Distended gallbladder with hyperdensity may be sludge or stones.


6.  Prominent left lobe of the liver is slightly nodular in appearance.





US ordered 





(6) Subdural hematoma


Assessment & Plan:  as per neurology


MRI noted


bm biopsy pending 














Tomas Adair Apr 20, 2020 10:11

## 2020-04-21 VITALS — SYSTOLIC BLOOD PRESSURE: 115 MMHG | DIASTOLIC BLOOD PRESSURE: 64 MMHG

## 2020-04-21 VITALS — DIASTOLIC BLOOD PRESSURE: 72 MMHG | SYSTOLIC BLOOD PRESSURE: 129 MMHG

## 2020-04-21 VITALS — SYSTOLIC BLOOD PRESSURE: 107 MMHG | DIASTOLIC BLOOD PRESSURE: 59 MMHG

## 2020-04-21 VITALS — SYSTOLIC BLOOD PRESSURE: 126 MMHG | DIASTOLIC BLOOD PRESSURE: 74 MMHG

## 2020-04-21 VITALS — DIASTOLIC BLOOD PRESSURE: 59 MMHG | SYSTOLIC BLOOD PRESSURE: 111 MMHG

## 2020-04-21 VITALS — DIASTOLIC BLOOD PRESSURE: 64 MMHG | SYSTOLIC BLOOD PRESSURE: 102 MMHG

## 2020-04-21 VITALS — SYSTOLIC BLOOD PRESSURE: 130 MMHG | DIASTOLIC BLOOD PRESSURE: 73 MMHG

## 2020-04-21 VITALS — SYSTOLIC BLOOD PRESSURE: 127 MMHG | DIASTOLIC BLOOD PRESSURE: 72 MMHG

## 2020-04-21 VITALS — DIASTOLIC BLOOD PRESSURE: 74 MMHG | SYSTOLIC BLOOD PRESSURE: 130 MMHG

## 2020-04-21 VITALS — SYSTOLIC BLOOD PRESSURE: 132 MMHG | DIASTOLIC BLOOD PRESSURE: 75 MMHG

## 2020-04-21 VITALS — DIASTOLIC BLOOD PRESSURE: 65 MMHG | SYSTOLIC BLOOD PRESSURE: 121 MMHG

## 2020-04-21 PROCEDURE — 0DB78ZX EXCISION OF STOMACH, PYLORUS, VIA NATURAL OR ARTIFICIAL OPENING ENDOSCOPIC, DIAGNOSTIC: ICD-10-PCS

## 2020-04-21 RX ADMIN — VANCOMYCIN HYDROCHLORIDE SCH MG: 500 INJECTION, POWDER, LYOPHILIZED, FOR SOLUTION INTRAVENOUS at 19:10

## 2020-04-21 RX ADMIN — DEXAMETHASONE SODIUM PHOSPHATE SCH MG: 4 INJECTION, SOLUTION INTRA-ARTICULAR; INTRALESIONAL; INTRAMUSCULAR; INTRAVENOUS; SOFT TISSUE at 12:00

## 2020-04-21 RX ADMIN — VANCOMYCIN HYDROCHLORIDE SCH MG: 500 INJECTION, POWDER, LYOPHILIZED, FOR SOLUTION INTRAVENOUS at 20:31

## 2020-04-21 RX ADMIN — INSULIN ASPART SCH UNITS: 100 INJECTION, SOLUTION INTRAVENOUS; SUBCUTANEOUS at 05:50

## 2020-04-21 RX ADMIN — INSULIN DETEMIR SCH UNITS: 100 INJECTION, SOLUTION SUBCUTANEOUS at 21:26

## 2020-04-21 RX ADMIN — Medication SCH TAB: at 14:05

## 2020-04-21 RX ADMIN — SODIUM CHLORIDE SCH MLS/HR: 0.9 INJECTION INTRAVENOUS at 20:31

## 2020-04-21 RX ADMIN — Medication SCH MG: at 19:10

## 2020-04-21 RX ADMIN — Medication SCH TAB: at 19:08

## 2020-04-21 RX ADMIN — VANCOMYCIN HYDROCHLORIDE SCH MG: 500 INJECTION, POWDER, LYOPHILIZED, FOR SOLUTION INTRAVENOUS at 10:21

## 2020-04-21 RX ADMIN — INSULIN ASPART SCH UNITS: 100 INJECTION, SOLUTION INTRAVENOUS; SUBCUTANEOUS at 16:50

## 2020-04-21 RX ADMIN — DEXAMETHASONE SODIUM PHOSPHATE SCH MG: 4 INJECTION, SOLUTION INTRA-ARTICULAR; INTRALESIONAL; INTRAMUSCULAR; INTRAVENOUS; SOFT TISSUE at 00:11

## 2020-04-21 RX ADMIN — VANCOMYCIN HYDROCHLORIDE SCH MG: 500 INJECTION, POWDER, LYOPHILIZED, FOR SOLUTION INTRAVENOUS at 14:05

## 2020-04-21 RX ADMIN — Medication SCH MG: at 09:00

## 2020-04-21 RX ADMIN — DEXAMETHASONE SODIUM PHOSPHATE SCH MG: 4 INJECTION, SOLUTION INTRA-ARTICULAR; INTRALESIONAL; INTRAMUSCULAR; INTRAVENOUS; SOFT TISSUE at 05:50

## 2020-04-21 RX ADMIN — DEXAMETHASONE SODIUM PHOSPHATE SCH MG: 4 INJECTION, SOLUTION INTRA-ARTICULAR; INTRALESIONAL; INTRAMUSCULAR; INTRAVENOUS; SOFT TISSUE at 23:31

## 2020-04-21 RX ADMIN — INSULIN ASPART SCH UNITS: 100 INJECTION, SOLUTION INTRAVENOUS; SUBCUTANEOUS at 16:30

## 2020-04-21 RX ADMIN — DEXAMETHASONE SODIUM PHOSPHATE SCH MG: 4 INJECTION, SOLUTION INTRA-ARTICULAR; INTRALESIONAL; INTRAMUSCULAR; INTRAVENOUS; SOFT TISSUE at 19:10

## 2020-04-21 RX ADMIN — INSULIN ASPART SCH UNITS: 100 INJECTION, SOLUTION INTRAVENOUS; SUBCUTANEOUS at 11:30

## 2020-04-21 RX ADMIN — Medication SCH TAB: at 09:00

## 2020-04-21 RX ADMIN — INSULIN ASPART SCH UNITS: 100 INJECTION, SOLUTION INTRAVENOUS; SUBCUTANEOUS at 11:50

## 2020-04-21 RX ADMIN — TRAMADOL HYDROCHLORIDE PRN MG: 50 TABLET, FILM COATED ORAL at 21:49

## 2020-04-21 RX ADMIN — INSULIN ASPART SCH UNITS: 100 INJECTION, SOLUTION INTRAVENOUS; SUBCUTANEOUS at 21:25

## 2020-04-21 NOTE — PRE-PROCEDURE NOTE/ATTESTATION
Pre-Procedure Note/Attestation


Complete Prior to Procedure


Planned Procedure:  not applicable


Procedure Narrative:


egd





Indications for Procedure


Pre-Operative Diagnosis:


anemia





Attestation


I attest that I discussed the nature of the procedure; its benefits; risks and 

complications; and alternatives (and the risks and benefits of such alternatives

), prior to the procedure, with the patient (or the patient's legal 

representative).





I attest that, if there was a reasonable possibility of needing a blood 

transfusion, the patient (or the patient's legal representative) was given the 

[facility] standardized written summary, pursuant 

to the [employer] Blood Safety Act ([place] Health and Safety Code # 1645, as 

amended).





I attest that I re-evaluated the patient just prior to the surgery and that 

there has been no change in the patient's H&P, except as documented below:











Macario Gibson MD Apr 21, 2020 10:00

## 2020-04-21 NOTE — NUR
NOTE



CALL RECEIVED FROM [email or web address] [facility] REQUESTING UPDATE ON PATIENTS STATUS. VERBAL REPORT 
GIVEN. 

PER XUAN, INPATIENT AUTHORIZATION WILL BE VALID UNTIL FRI 4/24/2020.



[facility]

[phone]

## 2020-04-21 NOTE — NEPHROLOGY PROGRESS NOTE
Assessment/Plan


Problem List:  


(1) Hypernatremia


Assessment:  Improving





(2) UTI (urinary tract infection)


(3) Subdural hematoma


(4) DKA (diabetic ketoacidoses)


(5) Electrolyte imbalance


(6) C. difficile colitis


Assessment





Hypernatremia most likely due to free water deficit


Hypokalemia, hypophosphatemia


Hyperglycemia and DKA


Evidence of UTI


Right-sided weakness with 3 mm subdural hematoma found in CT scan


Plan





Patient has C. difficile colitis now


Lactobacillus added


Discontinue IV fluid


Discontinue Vincent catheter


Change Protonix to p.o.


Stop Neutra-Phos due to diarrhea





Potassium and phosphorus and magnesium supplement as needed


Diet started as the patient is more alert


Low dose of Lopressor


Monitor renal parameters and electrolytes


Neuro eval noted


Keep the blood pressure and blood sugar in check


Urine studies


Per orders





Subjective


ROS Limited/Unobtainable:  No


Constitutional:  Reports: malaise





Objective


Objective





Last 24 Hour Vital Signs








  Date Time  Temp Pulse Resp B/P (MAP) Pulse Ox O2 Delivery O2 Flow Rate FiO2


 


4/21/20 04:00 96.4 64 20 115/64 (81) 95   


 


4/21/20 00:00 98.0 67 20 121/65 (83) 100   


 


4/20/20 21:00      Room Air  


 


4/20/20 20:19  80  116/63    


 


4/20/20 19:53 97.2 80 20 116/63 (80) 94   


 


4/20/20 16:00 98.3 73 18 119/71 (87) 94   


 


4/20/20 12:00 98.3 79 18 121/72 (88) 94   

















Intake and Output  


 


 4/20/20 4/21/20





 19:00 07:00


 


Output Total  900 ml


 


Balance  -900 ml


 


  


 


Output Urine Total  900 ml


 


# Bowel Movements 3 2








No labs drawn today


Height (Feet):  5


Height (Inches):  4.00


Weight (Pounds):  135


Cardiovascular:  normal rate


Respiratory/Chest:  lungs clear


Abdomen:  soft


Objective


No change











Jonathan Jacobson MD Apr 21, 2020 10:18

## 2020-04-21 NOTE — NUR
HAND-OFF: 

Report given to DEREK Pate.

-------------------------------------------------------------------------------

Addendum: 04/21/20 at 0735 by KIA REYES RN RN

-------------------------------------------------------------------------------

Error. Report given to DEREK Galeano

## 2020-04-21 NOTE — GENERAL PROGRESS NOTE
Assessment/Plan


Problem List:  


(1) Abnormal thyroid blood test


ICD Codes:  R79.89 - Other specified abnormal findings of blood chemistry


SNOMED:  378758092, 912160913763491


(2) Diabetes mellitus out of control


ICD Codes:  E11.65 - Type 2 diabetes mellitus with hyperglycemia


SNOMED:  17247433, 391748557


(3) DKA (diabetic ketoacidoses)


ICD Codes:  E11.10 - Type 2 diabetes mellitus with ketoacidosis without coma


SNOMED:  336003120, 69089662


(4) STEMI (ST elevation myocardial infarction)


ICD Codes:  I21.3 - ST elevation (STEMI) myocardial infarction of unspecified 

site


SNOMED:  701430391


(5) Prolonged Q-T interval on ECG


ICD Codes:  R94.31 - Abnormal electrocardiogram [ECG] [EKG]


SNOMED:  450176941


(6) Atrial fibrillation with rapid ventricular response


ICD Codes:  I48.91 - Unspecified atrial fibrillation


SNOMED:  322450218469369


(7) Hypernatremia


ICD Codes:  E87.0 - Hyperosmolality and hypernatremia


SNOMED:  707380151, 34827871


Assessment/Plan:


continue Levemir 30 units qhs 


reduce Novolog to 16 units ac tid 


continue NISS ac / hs 





TSH still suppressed, free T4 normalized 


continue to hold thyroxine





Subjective


Allergies:  


Coded Allergies:  


     No Known Allergies (Unverified , 4/15/15)


Subjective


events noted 


interval notes reviewed 


insulin was held today due to NPO status 











Item Value  Date Time


 


Bedside Blood Glucose 102 mg/dl 4/21/20 0550


 


Bedside Blood Glucose 214 mg/dl H 4/20/20 2100


 


Bedside Blood Glucose 167 mg/dl H 4/20/20 1719


 


Bedside Blood Glucose 251 mg/dl H 4/20/20 1221











Objective





Last 24 Hour Vital Signs








  Date Time  Temp Pulse Resp B/P (MAP) Pulse Ox O2 Delivery O2 Flow Rate FiO2


 


4/21/20 12:35 97.9 67 15 126/74 100 Nasal Cannula 3 


 


4/21/20 12:25  66 19 130/74 100 Nasal Cannula 3 


 


4/21/20 12:15  69 15 129/72 100 Nasal Cannula 3 


 


4/21/20 12:11  64 18  100   


 


4/21/20 12:07  65 17 111/59 100 Nasal Cannula 3 


 


4/21/20 12:02  65 16 127/72 100 Nasal Cannula 3 


 


4/21/20 11:57 97.6 64 18 102/64 100 Nasal Cannula 3 


 


4/21/20 11:54  64 18  100   


 


4/21/20 09:00      Room Air  


 


4/21/20 08:00 97.2 73 18 107/59 (75) 95   


 


4/21/20 04:00 96.4 64 20 115/64 (81) 95   


 


4/21/20 00:00 98.0 67 20 121/65 (83) 100   


 


4/20/20 21:00      Room Air  


 


4/20/20 20:19  80  116/63    


 


4/20/20 19:53 97.2 80 20 116/63 (80) 94   


 


4/20/20 16:00 98.3 73 18 119/71 (87) 94   

















Intake and Output  


 


 4/20/20 4/21/20





 19:00 07:00


 


Output Total  900 ml


 


Balance  -900 ml


 


  


 


Output Urine Total  900 ml


 


# Bowel Movements 3 2








Height (Feet):  5


Height (Inches):  4.00


Weight (Pounds):  135


General Appearance:  no apparent distress


Neck:  normal alignment


Cardiovascular:  normal rate


Respiratory/Chest:  decreased breath sounds


Abdomen:  normal bowel sounds


Objective





Current Medications








 Medications


  (Trade)  Dose


 Ordered  Sig/Meghan


 Route


 PRN Reason  Start Time


 Stop Time Status Last Admin


Dose Admin


 


 Acetaminophen


  (Tylenol)  650 mg  Q6H  PRN


 ORAL


 Mild Pain (Pain Scale 1-3)  4/17/20 10:00


 5/17/20 09:59   


 


 


 Ascorbic Acid


  (Vitamin C)  250 mg  TWICE A  DAY


 ORAL


   4/16/20 18:00


 5/15/20 17:59  4/20/20 18:34


 


 


 Atorvastatin


 Calcium


  (Lipitor)  10 mg  BEDTIME


 ORAL


   4/16/20 21:00


 7/10/20 20:59  4/20/20 20:19


 


 


 Ceftriaxone


 Sodium 1 gm/


 Dextrose  50 ml @ 


 100 mls/hr  Q24H


 IVPB


   4/16/20 21:00


 4/22/20 20:59  4/20/20 20:18


 


 


 Dexamethasone


 Sodium Phosphate


  (Decadron 4mg/ml


 vial)  4 mg  Q6HR


 IVP


   4/17/20 00:00


 7/16/20 00:00  4/21/20 05:50


 


 


 Dextrose


  (Dextrose 50%)  25 ml  Q30M  PRN


 IV


 Hypoglycemia  4/16/20 16:45


 7/9/20 21:44   


 


 


 Dextrose


  (Dextrose 50%)  50 ml  Q30M  PRN


 IV


 Hypoglycemia  4/16/20 16:45


 7/9/20 21:44   


 


 


 Insulin Aspart


  (NovoLOG)    AC+HS


 SUBQ


   4/16/20 21:00


 7/10/20 09:29  4/20/20 20:23


 


 


 Insulin Aspart


  (NovoLOG)  20 units  NOVOTIAC


 SUBQ


   4/20/20 11:50


 7/11/20 11:49  4/20/20 17:19


 


 


 Insulin Detemir


  (Levemir)  30 units  QHS


 SUBQ


   4/16/20 21:00


 7/11/20 20:59  4/20/20 20:22


 


 


 Lactobacillus


 Acidophilus


  (Culturelle)  1 tab  THREE TIMES A  DAY


 ORAL


   4/19/20 13:00


 7/18/20 12:59  4/21/20 14:05


 


 


 Levetiracetam


  (Keppra)  250 mg  Q12HR


 ORAL


   4/16/20 21:00


 5/29/20 20:59  4/21/20 10:21


 


 


 Lorazepam


  (Ativan)  1 mg  Q6H  PRN


 ORAL


 For Anxiety  4/17/20 08:30


 4/24/20 08:29   


 


 


 Metoprolol


 Tartrate


  (Lopressor)  25 mg  Q12HR


 ORAL


   4/16/20 21:00


 7/10/20 20:59  4/20/20 20:19


 


 


 Potassium Chloride


  (K-Dur)  40 meq  BID


 ORAL


   4/16/20 18:00


 7/12/20 09:14  4/20/20 17:17


 


 


 Tramadol HCl


  (Ultram)  25 mg  Q6H  PRN


 ORAL


 Severe Breakthru Pain (>7)  4/16/20 16:48


 4/23/20 16:47  4/19/20 22:01


 


 


 Vancomycin HCl


  (Firvanq)  125 mg  FOUR TIMES A  DAY


 ORAL


   4/16/20 18:00


 4/26/20 17:59  4/21/20 14:05


 

















Kevin Mohr MD Apr 21, 2020 14:29

## 2020-04-21 NOTE — PROCEDURE NOTE
DATE OF PROCEDURE:  04/21/2020

SURGEON:  Macario Gibson M.D.



PROCEDURE:  Upper endoscopy with biopsy.



ANESTHESIA:  Per Dr. Ortiz.



INSTRUMENT:  Olympus adult flexible upper endoscope.



INDICATION:  Anemia, rule out gastric cancer.



REASON FOR PROCEDURE:  The procedure, risks, benefits, and possible

consequences, including hemorrhage, aspiration, perforation and infection,

and alternative treatments, were explained to the patient/legal guardian

by Dr. Macario Gibson and the patient/legal guardian understood and

accepted these risks.



DESCRIPTION OF PROCEDURE:  After informed consent was obtained and the

patient was adequately sedated, Olympus upper endoscope was advanced from

mouth into the second portion of the duodenum and retroflexion was

performed in the stomach.



The patient had evidence of diffuse gastritis.  Random biopsy from

antrum was obtained to rule out H. pylori infection.  Otherwise, the rest

of the upper endoscopic examination grossly normal limits.  The patient

tolerated procedure very well without any complication.



SUMMARY OF FINDINGS:  Gastritis, otherwise normal upper endoscopic

examination.



RECOMMENDATIONS:

1. Follow up biopsy results and treat accordingly.

2. We will order CEA.

3. We will order order CA 19-9.

4. Given the patient has current C. difficile infection, we recommend

outpatient colonoscopy.



I want to thank, Dr. Trevizo, for this kind referral.









  ______________________________________________

  Macario Gibson M.D.





DR:  Pop

D:  04/21/2020 11:49

T:  04/21/2020 16:16

JOB#:  [id number]

CC:  Geoffrey Trevizo M.D.; Fax#:  [phone]

## 2020-04-21 NOTE — HEMATOLOGY/ONC PROGRESS NOTE
Assessment/Plan


Assessment/Plan


Assessment and Recs:


# Multilevel bone marrow signal abnormality, as described. Differential 

considerations include  metastatic neoplasm, infiltrative neoplasm such as 

myeloma or lymphoma, exuberant hematopoietic hyperplasia, less likely systemic 

metabolic disorders. Note that this is much more striking in the thoracic spine 

than it is in the lumbar and cervical segments study earlier


--> tumor markers have been ordered


--> spep and upep


--> bone marrow biopsy was done which shows a increase in Nk population, may be 

reactive v clonal process


--> also ordered for ct c/a/p with iv contrast - reviewed, no primary tumor. 

Slightly more conspicuous, since prior exam of one week earlier, pleural-based 

nodules in the right costophrenic sulcus. Apparent interim growth over short 

perio suggests that this is inflammatory or an area of atelectasis. Unchanged 3 

mm left lower lobe subpleural nodule along the major fissure.


--> r/o hemolysis at this time, DIC


--> dw pcp


# DKA (diabetic ketoacidoses)


--> as per endo, iss and accuchecks qac and qhs


--> hgb a1c goal <8, now 10.4


--> maintains on dexamethasone


# Hypernatremia


--> as per renal recs


--> continue ivfs


# UTI (urinary tract infection)


--> per id, on abx: ceftriaxone 


# Ground-level fall.


--> pt/ot as needed


# Nausea and vomiting.


--> zofran prn


# Right sided weakness


# Generalized Weakness


# SDH 3mm no midline shift


# Elevated d-dimer


# Tachycardia - resolved





Appreciate consultation and leoncio Rn





Subjective


Constitutional:  Denies: no symptoms, chills, fever, malaise, weakness, other


HEENT:  Denies: no symptoms, eye pain, blurred vision, tearing, double vision, 

ear pain, ear discharge, nose pain, nose congestion, throat pain, throat 

swelling, mouth pain, mouth swelling, other


Cardiovascular:  Denies: no symptoms, chest pain, edema, irregular heart rate, 

lightheadedness, palpitations, syncope, other


Respiratory:  Denies: no symptoms, cough, shortness of breath, SOB with 

excertion, SOB at rest, sputum, wheezing, other


Gastrointestinal/Abdominal:  Denies: no symptoms, abdomen distended, abdominal 

pain, black stools, tarry stools, blood in stool, constipated, diarrhea, 

difficulty swallowing, nausea, poor appetite, poor fluid intake, rectal bleeding

, vomiting, other


Genitourinary:  Denies: no symptoms, burning, discharge, frequency, flank pain, 

hematuria, incontinence, pain, urgency, other


Neurologic/Psychiatric:  Denies: no symptoms, anxiety, depressed, emotional 

problems, headache, numbness, paresthesia, pre-existing deficit, seizure, 

tingling, tremors, weakness, other


Endocrine:  Denies: no symptoms, excessive sweating, flushing, intolerance to 

cold, intolerance to heat, increased hunger, increased thirst, increased urine, 

unexplained weight gain, unexplained weight loss, other


Allergies:  


Coded Allergies:  


     No Known Allergies (Unverified , 4/15/15)


Subjective


4/17 cxr and labs reviewed, on room air, bp stable, bone marrow biopsy ordered 


4/19 med surg, no overnight events, ct and labs reviewed, on abx 


4/20 labs are noted, no bleeding hgb 11, plt much better


4/21 labs slowly improving, remains on abx as per id, no bleeding





Objective


Objective





Current Medications








 Medications


  (Trade)  Dose


 Ordered  Sig/Meghan


 Route


 PRN Reason  Start Time


 Stop Time Status Last Admin


Dose Admin


 


 Acetaminophen


  (Tylenol)  650 mg  Q6H  PRN


 ORAL


 Mild Pain (Pain Scale 1-3)  4/17/20 10:00


 5/17/20 09:59   


 


 


 Ascorbic Acid


  (Vitamin C)  250 mg  TWICE A  DAY


 ORAL


   4/16/20 18:00


 5/15/20 17:59  4/20/20 18:34


 


 


 Atorvastatin


 Calcium


  (Lipitor)  10 mg  BEDTIME


 ORAL


   4/16/20 21:00


 7/10/20 20:59  4/20/20 20:19


 


 


 Ceftriaxone


 Sodium 1 gm/


 Dextrose  50 ml @ 


 100 mls/hr  Q24H


 IVPB


   4/16/20 21:00


 4/22/20 20:59  4/20/20 20:18


 


 


 Dexamethasone


 Sodium Phosphate


  (Decadron 4mg/ml


 vial)  4 mg  Q6HR


 IVP


   4/17/20 00:00


 7/16/20 00:00  4/21/20 05:50


 


 


 Dextrose


  (Dextrose 50%)  25 ml  Q30M  PRN


 IV


 Hypoglycemia  4/16/20 16:45


 7/9/20 21:44   


 


 


 Dextrose


  (Dextrose 50%)  50 ml  Q30M  PRN


 IV


 Hypoglycemia  4/16/20 16:45


 7/9/20 21:44   


 


 


 Insulin Aspart


  (NovoLOG)    AC+HS


 SUBQ


   4/16/20 21:00


 7/10/20 09:29  4/20/20 20:23


 


 


 Insulin Aspart


  (NovoLOG)  20 units  NOVOTIAC


 SUBQ


   4/20/20 11:50


 7/11/20 11:49  4/20/20 17:19


 


 


 Insulin Detemir


  (Levemir)  30 units  QHS


 SUBQ


   4/16/20 21:00


 7/11/20 20:59  4/20/20 20:22


 


 


 Lactobacillus


 Acidophilus


  (Culturelle)  1 tab  THREE TIMES A  DAY


 ORAL


   4/19/20 13:00


 7/18/20 12:59  4/20/20 17:17


 


 


 Levetiracetam


  (Keppra)  250 mg  Q12HR


 ORAL


   4/16/20 21:00


 5/29/20 20:59  4/20/20 20:19


 


 


 Lorazepam


  (Ativan)  1 mg  Q6H  PRN


 ORAL


 For Anxiety  4/17/20 08:30


 4/24/20 08:29   


 


 


 Metoprolol


 Tartrate


  (Lopressor)  25 mg  Q12HR


 ORAL


   4/16/20 21:00


 7/10/20 20:59  4/20/20 20:19


 


 


 Potassium Chloride


  (K-Dur)  40 meq  BID


 ORAL


   4/16/20 18:00


 7/12/20 09:14  4/20/20 17:17


 


 


 Tramadol HCl


  (Ultram)  25 mg  Q6H  PRN


 ORAL


 Severe Breakthru Pain (>7)  4/16/20 16:48


 4/23/20 16:47  4/19/20 22:01


 


 


 Vancomycin HCl


  (Firvanq)  125 mg  FOUR TIMES A  DAY


 ORAL


   4/16/20 18:00


 4/26/20 17:59  4/20/20 20:18


 











Last 24 Hour Vital Signs








  Date Time  Temp Pulse Resp B/P (MAP) Pulse Ox O2 Delivery O2 Flow Rate FiO2


 


4/21/20 04:00 96.4 64 20 115/64 (81) 95   


 


4/21/20 00:00 98.0 67 20 121/65 (83) 100   


 


4/20/20 21:00      Room Air  


 


4/20/20 20:19  80  116/63    


 


4/20/20 19:53 97.2 80 20 116/63 (80) 94   


 


4/20/20 16:00 98.3 73 18 119/71 (87) 94   


 


4/20/20 12:00 98.3 79 18 121/72 (88) 94   


 


4/20/20 09:00      Room Air  


 


4/20/20 08:39  70  128/66    


 


4/20/20 08:00 96.4 78 18 118/62 (80) 94   


 


4/20/20 04:00 96.4 70 18 126/66 (86) 94   


 


4/20/20 00:00 97.3 76 22 134/75 (94) 96   


 


4/19/20 22:01  87  121/66    


 


4/19/20 21:00      Room Air  


 


4/19/20 20:00 97.4 87 20 121/66 (84) 98   


 


4/19/20 16:06 97.2 87 18 121/81 (94) 97   


 


4/19/20 12:06 98.1 98 18 127/79 (95) 96   


 


4/19/20 09:00      Room Air  


 


4/19/20 08:41  63  131/70    


 


4/19/20 08:00 98.7 63 18 131/70 (90) 94   

















Intake and Output  


 


 4/20/20 4/21/20





 19:00 07:00


 


  


 


# Bowel Movements 3 1











Labs








Test


  4/19/20


06:45 4/20/20


05:50


 


White Blood Count


  8.7 K/UL


(4.8-10.8) 9.8 K/UL


(4.8-10.8)


 


Red Blood Count


  3.65 M/UL


(4.20-5.40) 3.58 M/UL


(4.20-5.40)


 


Hemoglobin


  11.4 G/DL


(12.0-16.0) 11.1 G/DL


(12.0-16.0)


 


Hematocrit


  32.1 %


(37.0-47.0) 31.3 %


(37.0-47.0)


 


Mean Corpuscular Volume 88 FL (80-99)  88 FL (80-99) 


 


Mean Corpuscular Hemoglobin


  31.2 PG


(27.0-31.0) 31.2 PG


(27.0-31.0)


 


Mean Corpuscular Hemoglobin


Concent 35.5 G/DL


(32.0-36.0) 35.6 G/DL


(32.0-36.0)


 


Red Cell Distribution Width


  11.0 %


(11.6-14.8) 11.4 %


(11.6-14.8)


 


Platelet Count


  151 K/UL


(150-450) 182 K/UL


(150-450)


 


Mean Platelet Volume


  7.4 FL


(6.5-10.1) 7.2 FL


(6.5-10.1)


 


Neutrophils (%) (Auto)


  84.0 %


(45.0-75.0) 84.8 %


(45.0-75.0)


 


Lymphocytes (%) (Auto)


  12.8 %


(20.0-45.0) 11.4 %


(20.0-45.0)


 


Monocytes (%) (Auto)


  3.0 %


(1.0-10.0) 3.5 %


(1.0-10.0)


 


Eosinophils (%) (Auto)


  0.0 %


(0.0-3.0) 0.0 %


(0.0-3.0)


 


Basophils (%) (Auto)


  0.2 %


(0.0-2.0) 0.3 %


(0.0-2.0)


 


Sodium Level


  143 MMOL/L


(136-145) 140 MMOL/L


(136-145)


 


Potassium Level


  4.0 MMOL/L


(3.5-5.1) 4.2 MMOL/L


(3.5-5.1)


 


Chloride Level


  107 MMOL/L


() 106 MMOL/L


()


 


Carbon Dioxide Level


  26 MMOL/L


(21-32) 26 MMOL/L


(21-32)


 


Anion Gap


  10 mmol/L


(5-15) 8 mmol/L


(5-15)


 


Blood Urea Nitrogen


  15 mg/dL


(7-18) 13 mg/dL


(7-18)


 


Creatinine


  0.4 MG/DL


(0.55-1.30) 0.5 MG/DL


(0.55-1.30)


 


Estimat Glomerular Filtration


Rate > 60 mL/min


(>60) > 60 mL/min


(>60)


 


Glucose Level


  188 MG/DL


() 197 MG/DL


()


 


Calcium Level


  8.1 MG/DL


(8.5-10.1) 8.1 MG/DL


(8.5-10.1)


 


Thyroid Stimulating Hormone


(TSH) 0.099 uiU/mL


(0.358-3.740) 


 


 


Free Thyroxine


  1.14 NG/DL


(0.76-1.46) 


 


 


Uric Acid


  


  2.0 MG/DL


(2.6-7.2)


 


Phosphorus Level


  


  3.2 MG/DL


(2.5-4.9)


 


Magnesium Level


  


  2.2 MG/DL


(1.8-2.4)


 


Total Bilirubin


  


  0.1 MG/DL


(0.2-1.0)


 


Gamma Glutamyl Transpeptidase  156 U/L (5-85) 


 


Aspartate Amino Transf


(AST/SGOT) 


  41 U/L (15-37) 


 


 


Alanine Aminotransferase


(ALT/SGPT) 


  66 U/L (12-78) 


 


 


Alkaline Phosphatase


  


  153 U/L


()


 


C-Reactive Protein,


Quantitative 


  0.7 mg/dL


(0.00-0.90)


 


Pro-B-Type Natriuretic Peptide


  


  234 pg/mL


(0-125)


 


Total Protein


  


  6.2 G/DL


(6.4-8.2)


 


Albumin


  


  1.8 G/DL


(3.4-5.0)


 


Globulin  4.4 g/dL 


 


Albumin/Globulin Ratio  0.4 (1.0-2.7) 








Height (Feet):  5


Height (Inches):  4.00


Weight (Pounds):  135


Objective


Physical Exam


General: Awake and somnolent


HEENT: NC/AT. EOMI. dry mucous membranes


Cardiovascular: RRR.  S1 and S2 normal.


Resp: Normal work of breathing. No cough, wheezing or crackles appreciated


Abdomen: Abdomen is soft, nondistended


Skin: Intact.  No abrasions, laceration or rash over the exposed skin


MSK: Normal tone and bulk.


Neuro: Awake and alert.











Dmitry Levin MD Apr 21, 2020 06:23

## 2020-04-21 NOTE — NEUROLOGY PROGRESS NOTE
Interim History


Interim History


ROS Limited/Unobtainable:  No


Interim History


remains paraparetic, work up ongoing. Unclear etiology of this spinal lesions.


ct C A P neg


BM non specific





Objective


Physical Exam





Last Vital Signs








  Date Time  Temp Pulse Resp B/P (MAP) Pulse Ox O2 Delivery O2 Flow Rate FiO2


 


4/21/20 20:31  72  132/75    


 


4/21/20 16:00 96.8  18  98   


 


4/21/20 12:35      Nasal Cannula 3 











Laboratory Tests








Test


  4/21/20


17:00


 


CA 15-3 Antigen Pending  








Head:  normocophalic


Neck:  no rigidity


EENT:  benign





Neurologic Exam


Mental Status:  awake


Speech:  normal speech


Language:  normal language


Cranial Nerve VII:  no facial asymmetry


Objective


alert, oriented x 2, conversant


UEs 5/5


LEs 1/5 flaccid 


cc 35 min





Impression/Recommendations


Problems:  


(1) STEMI (ST elevation myocardial infarction)


(2) Atrial fibrillation with rapid ventricular response


(3) Prolonged Q-T interval on ECG


(4) DKA (diabetic ketoacidoses)


(5) Hypernatremia


(6) UTI (urinary tract infection)


(7) Subdural hematoma


Diagnostic Impression





Given LE weakness , suspect myelopathy, mri cervical thoracic and lumbar ordered

- called radiology to expedite, imaging still not in system to review





 keppra to 250 mg bid


SBP < 150


PT OT


consider Osmin Chao MD Apr 21, 2020 22:09

## 2020-04-21 NOTE — NUR
CASE MANAGEMENT:REVIEW



SI;DIABETIC KETOACIDOSIS. SEPSIS D/T E COLI BACTEREMIA AND UTI. C-DIFF.

BACTEREMIA. C-DIFF.  BONE MARROW SIGNAL ABNORMALITY. S/P EGD TODAY

96.4   73   20   102/64   95% ON RA

NO LABS AVAILABLE



IS;EGD

DECADRON IV Q6 HRS

K-DUR PO BID

VANCOMYCIN PO QID

KEPPRA PO Q12 HRS

ROCEPHIN IV Q24 HRS

INSULIN LEVEMIR

INSULIN  NOVOLOG



******MED SURG STATUS*****



DCP;TO [facility]

## 2020-04-21 NOTE — ANETHESIA PREOPERATIVE EVAL
Anesthesia Pre-op PMH/ROS


General


Date of Evaluation:  Apr 21, 2020


Time of Evaluation:  11:22


Anesthesiologist:  jj


ASA Score:  ASA 3


Mallampati Score


Class I : Soft palate, uvula, fauces, pillars visible


Class II: Soft palate, uvula, fauces visible


Class III: Soft palate, base of uvula visible


Class IV: Only hard plate visible


Mallampati Classification:  Class II


Surgeon:  yelena


Surgical Procedure:  egd


Anesthesia History:  none


Social History:  smoking - nonsmoker


Family History:  no anesthesia problems


Allergies:  


Coded Allergies:  


     No Known Allergies (Unverified , 4/15/15)


Medications:  see eMAR


Patient NPO?:  Yes





Past Medical History


Cardiovascular:  Reports: HTN, MI, arrhythmia


Neurologic/Psychiatric:  Reports: other - subdural hematoma


Endocrine:  Reports: DM, other - abnl thyroid





Anesthesia Pre-op Phys. Exam


Physician Exam





Last Vital Signs








  Date Time  Temp Pulse Resp B/P (MAP) Pulse Ox O2 Delivery O2 Flow Rate FiO2


 


4/21/20 09:00      Room Air  


 


4/21/20 08:00 97.2 73 18 107/59 (75) 95   








Constitutional:  NAD


Neurologic:  CN 2-12 intact


Cardiovascular:  RRR


Respiratory:  CTA


Gastrointestinal:  S/NT/ND





Airway Exam


Mallampati Score:  Class II


MO:  limited


Neck:  flexible


TMD:  2fb


ROM:  limited





Anesthesia Pre-op A/P


Labs





Labs








Test


  4/19/20


06:45 4/20/20


05:50


 


White Blood Count


  8.7 K/UL


(4.8-10.8) 9.8 K/UL


(4.8-10.8)


 


Red Blood Count


  3.65 M/UL


(4.20-5.40) 3.58 M/UL


(4.20-5.40)


 


Hemoglobin


  11.4 G/DL


(12.0-16.0) 11.1 G/DL


(12.0-16.0)


 


Hematocrit


  32.1 %


(37.0-47.0) 31.3 %


(37.0-47.0)


 


Mean Corpuscular Volume 88 FL (80-99)  88 FL (80-99) 


 


Mean Corpuscular Hemoglobin


  31.2 PG


(27.0-31.0) 31.2 PG


(27.0-31.0)


 


Mean Corpuscular Hemoglobin


Concent 35.5 G/DL


(32.0-36.0) 35.6 G/DL


(32.0-36.0)


 


Red Cell Distribution Width


  11.0 %


(11.6-14.8) 11.4 %


(11.6-14.8)


 


Platelet Count


  151 K/UL


(150-450) 182 K/UL


(150-450)


 


Mean Platelet Volume


  7.4 FL


(6.5-10.1) 7.2 FL


(6.5-10.1)


 


Neutrophils (%) (Auto)


  84.0 %


(45.0-75.0) 84.8 %


(45.0-75.0)


 


Lymphocytes (%) (Auto)


  12.8 %


(20.0-45.0) 11.4 %


(20.0-45.0)


 


Monocytes (%) (Auto)


  3.0 %


(1.0-10.0) 3.5 %


(1.0-10.0)


 


Eosinophils (%) (Auto)


  0.0 %


(0.0-3.0) 0.0 %


(0.0-3.0)


 


Basophils (%) (Auto)


  0.2 %


(0.0-2.0) 0.3 %


(0.0-2.0)


 


Sodium Level


  143 MMOL/L


(136-145) 140 MMOL/L


(136-145)


 


Potassium Level


  4.0 MMOL/L


(3.5-5.1) 4.2 MMOL/L


(3.5-5.1)


 


Chloride Level


  107 MMOL/L


() 106 MMOL/L


()


 


Carbon Dioxide Level


  26 MMOL/L


(21-32) 26 MMOL/L


(21-32)


 


Anion Gap


  10 mmol/L


(5-15) 8 mmol/L


(5-15)


 


Blood Urea Nitrogen


  15 mg/dL


(7-18) 13 mg/dL


(7-18)


 


Creatinine


  0.4 MG/DL


(0.55-1.30) 0.5 MG/DL


(0.55-1.30)


 


Estimat Glomerular Filtration


Rate > 60 mL/min


(>60) > 60 mL/min


(>60)


 


Glucose Level


  188 MG/DL


() 197 MG/DL


()


 


Calcium Level


  8.1 MG/DL


(8.5-10.1) 8.1 MG/DL


(8.5-10.1)


 


Thyroid Stimulating Hormone


(TSH) 0.099 uiU/mL


(0.358-3.740) 


 


 


Free Thyroxine


  1.14 NG/DL


(0.76-1.46) 


 


 


Uric Acid


  


  2.0 MG/DL


(2.6-7.2)


 


Phosphorus Level


  


  3.2 MG/DL


(2.5-4.9)


 


Magnesium Level


  


  2.2 MG/DL


(1.8-2.4)


 


Total Bilirubin


  


  0.1 MG/DL


(0.2-1.0)


 


Gamma Glutamyl Transpeptidase  156 U/L (5-85) 


 


Aspartate Amino Transf


(AST/SGOT) 


  41 U/L (15-37) 


 


 


Alanine Aminotransferase


(ALT/SGPT) 


  66 U/L (12-78) 


 


 


Alkaline Phosphatase


  


  153 U/L


()


 


C-Reactive Protein,


Quantitative 


  0.7 mg/dL


(0.00-0.90)


 


Pro-B-Type Natriuretic Peptide


  


  234 pg/mL


(0-125)


 


Total Protein


  


  6.2 G/DL


(6.4-8.2)


 


Albumin


  


  1.8 G/DL


(3.4-5.0)


 


Globulin  4.4 g/dL 


 


Albumin/Globulin Ratio  0.4 (1.0-2.7) 











Risk Assessment & Plan


Assessment:


asa3


Plan:


mac


Status Change Before Surgery:  No





Pre-Antibiotics


Drug:  Mitali Ndiaye MD Apr 21, 2020 11:26

## 2020-04-21 NOTE — 48 HOUR POST ANESTHESIA EVAL
Post Anesthesia Evaluation


Procedure:  egd


Date of Evaluation:  Apr 21, 2020


Time of Evaluation:  12:01


Blood Pressure Systolic:  101


0:  54


Pulse Rate:  64


Respiratory Rate:  18


Temperature (Fahrenheit):  98.2


O2 Sat by Pulse Oximetry:  100


Airway:  patent


Nausea:  No


Vomiting:  No


Pain Intensity:  0


Hydration Status:  adequate


Cardiopulmonary Status:


stable


Mental Status/LOC:  patient returned to baseline


Post-Anesthesia Complications:


none


Follow-up care needed:  N/A











Mitali Vazquez MD Apr 21, 2020 12:11

## 2020-04-21 NOTE — GENERAL PROGRESS NOTE
Assessment/Plan


Assessment/Plan:


65-year-old female with PMH of medical noncompliance, DM type II who presents 

with b/l weakness, N/V, GLF, on admission pt found to be in DKA with 's.





#DKA


#E. coli UTI


#E. coli bacteremia


#Uncontrolled DM, Hgb A1C 10.4


#c. diff colitis


-continue in-patient medical care


-s/p DKA protocol with improved AG, AG 21-> 14


-CT abd reviewed


-4/10: UCx e. coli


-4/10: BCx w/E. coli 2/2, repeat 4/12 BCx NGTD


-c.diff positive, contact precautions


-cont. accuchecks, ISS qAC/HS


-Endo following: Levemir 30 units qhs, Novolog to 15 units ac tid


-ID, Dr. Looney: CTX, s/p #10/10 of abx, PO vanco for c diff day #8 of 

total 14 days





#Multilevel Thoracic BM enhancements, concern for malignancy


-suspicions for metastatic neoplasm, infiltrative neoplasm such as myeloma or 

lymphoma


-4/10: CT abd/pel w/o contrast reviewed with no abnormalities


-4/17 CT C/A/P without any lesions suspicious for primary malignancy


-CEA elevated


- wnl


-Follow up BM biopsy and PEP/UPEP results


-For EGD today


-d/w GI, EGD negative, will need colonoscopy. Given c diff will likely need to 

do as o/p


-d/w Heme/Onc, reviewed imaging, no focal oncological process identified at 

this time, imaging may be more representative of inflammatory process





#Right sided weakness


#Generalized Weakness


#SDH 3mm no midline shift


-weakness may be multifactorial, metabolic


-pt AOx3, pt noted fall 3 days ago


-found on CT head, repeat CT head stable


-4/14: CT head obtained given worsening weakness, stable compared to previous


-d/w Neuro, no need for transfer at this time, will decrease keppra


-cont. fall precautions


-holding ASA given SDH


-statin


-PT treat and eval


-d/w Neurology, Dr. Cheney, weakness does not appear to be neurologic at this 

time


-CM for d/c planning, plan for CRI on d/c





#Elevated d-dimer


#Tachycardia - resolved


-likely multifactorial given infection/DKA, PE has been ruled out


-EKG w/NSR


-CTA thorax negative for PE


-tachycardia likely 2/2 dehydration, encourage PO intake


-no OAC at this time





#Hypernatremia - resolved


#Hypokalemia - improved


#Hypophosphatemia


-Hypernatremia likely 2/2 free water deficit


-replace electrolytes, cont. to monitor and replace PRN


-bruner d/c'd


-Nephro following, IVF and electrolytes per nephro





DVT - SCDs given SDH





Time spent: 36 minutes on this patient's case, d/w pt POC, updated RN. D/w 

consulting MDs, RN, case management





Time of note may not reflect time of encounter.





Subjective


Allergies:  


Coded Allergies:  


     No Known Allergies (Unverified , 4/15/15)


Subjective


F/u DKA, UTI, hypernatremia, SDH, MRI enhancements on thoracic spine concerning 

for malignancy.


EGD today. Pt notes she cont. to have LE weakness. States she cont to have 

loose stools x3. No pain at this time.





Objective





Last 24 Hour Vital Signs








  Date Time  Temp Pulse Resp B/P (MAP) Pulse Ox O2 Delivery O2 Flow Rate FiO2


 


4/21/20 04:00 96.4 64 20 115/64 (81) 95   


 


4/21/20 00:00 98.0 67 20 121/65 (83) 100   


 


4/20/20 21:00      Room Air  


 


4/20/20 20:19  80  116/63    


 


4/20/20 19:53 97.2 80 20 116/63 (80) 94   


 


4/20/20 16:00 98.3 73 18 119/71 (87) 94   


 


4/20/20 12:00 98.3 79 18 121/72 (88) 94   

















Intake and Output  


 


 4/20/20 4/21/20





 19:00 07:00


 


Output Total  900 ml


 


Balance  -900 ml


 


  


 


Output Urine Total  900 ml


 


# Bowel Movements 3 2








Height (Feet):  5


Height (Inches):  4.00


Weight (Pounds):  135


Objective


General: NAD, laying in bed comfortably, AAO x3


HEENT: NCAT, EOMi, mucous membranes moist


CV: RRR, no murmurs, rubs, or gallops


Pulm: CTAB, No wheezes, rhonchi, or rales, no accessory muscle usage


GI: Soft, nontender, nondistended, bowel sounds present


Neuro: CN II-XII grossly intact, sensation intact bilaterally, MS 3/5 in UE, 0/

5 LE B/L, able to wiggle toes


Ext: no LE edema











Anitha Ochoa M.D. Apr 21, 2020 10:59

## 2020-04-21 NOTE — NUR
NURSE NOTES:

Received patient on bed, awake. IV site intact and patent. Bed in low and locked position, 
call light in reach. No signs of respiratory distress or pain. Will continue to monitor.

## 2020-04-21 NOTE — NUR
NURSE NOTES:WOUND CARE FOLLOW-UP NOTES: Sacral DTPI now partially opened. Base of wound is 
90% indurated,purple 10% josy and moist. Borders are maroon in colour and adherent to base 
of wound(L)6.5cm x (W)6.9cm. No odor or exudate noted. Non-blanching erythema noted to 
perianal area.

Non-blanching erythema noted to L Ischium. Pt denied tenderness when palpated.

Both heels are firm and easily blanchable.

Small reabsorbing blood blister noted to L inguinal.Covered with Optifoam drsg.



Tx.Plan:Cleanse Sacral wound with Saline. Apply TheraHoney. Apply Moisture Barrier Paste 
periwound. Cover with Optifoam

           drsg DAILY and PRN.

           Apply Cavilon Skin Barrier to both heels. Cover each heel with Optifoam drsg. 
Change every 7 days and prn.

           APM/VIK Mattress.

           Reposition at least every 2hours or as tolerated.

           Off-load heels with pillow.

## 2020-04-21 NOTE — SURGERY PROGRESS NOTE
Surgery Progress Note


Subjective


Symptoms:  improved, tolerating diet, passing flatus





Objective





Last 24 Hour Vital Signs








  Date Time  Temp Pulse Resp B/P (MAP) Pulse Ox O2 Delivery O2 Flow Rate FiO2


 


4/21/20 22:18      Room Air  


 


4/21/20 20:31  72  132/75    


 


4/21/20 20:00 98.1 71 19 130/73 (92) 98   


 


4/21/20 16:00 96.8 72 18 132/75 (94) 98   


 


4/21/20 12:35 97.9 67 15 126/74 100 Nasal Cannula 3 


 


4/21/20 12:25  66 19 130/74 100 Nasal Cannula 3 


 


4/21/20 12:15  69 15 129/72 100 Nasal Cannula 3 


 


4/21/20 12:11  64 18  100   


 


4/21/20 12:07  65 17 111/59 100 Nasal Cannula 3 


 


4/21/20 12:02  65 16 127/72 100 Nasal Cannula 3 


 


4/21/20 11:57 97.6 64 18 102/64 100 Nasal Cannula 3 


 


4/21/20 11:54  64 18  100   


 


4/21/20 09:00      Room Air  


 


4/21/20 08:00 97.2 73 18 107/59 (75) 95   


 


4/21/20 04:00 96.4 64 20 115/64 (81) 95   


 


4/21/20 00:00 98.0 67 20 121/65 (83) 100   








I&O











Intake and Output  


 


 4/20/20 4/21/20





 19:00 07:00


 


Output Total  900 ml


 


Balance  -900 ml


 


  


 


Output Urine Total  900 ml


 


# Bowel Movements 3 2








Dressing:  dry


Wound:  dry


Cardiovascular:  RSR


Respiratory:  clear


Abdomen:  soft, non-tender, present bowel sounds


Extremities:  no edema, no cyanosis





Laboratory Tests








Test


  4/21/20


17:00


 


CA 15-3 Antigen Pending  











Plan


Problems:  


(1) Decubitus skin ulcer


Assessment & Plan:  Patient noted to have rapid onset Sacral DTPI. Per Primary 

Nurse pt noted on prior shift to have non-blanching erythema as per report.  

All preventive measures were taken to reposition patient and off-load pressure.

  Despite repositioning and applying Moisture Barrier Paste patient noted to 

have further skin decline. 


Sacral DTPI is irregular shaped, Purple and indurated in colour over 

sacrococcygeal with surrounding non-blanching erythema along borders.(L)7.1cm x 

(W)6.5cm. 


Nutritional intake varies 30-45% daily. 


Non-Blanching erythema without fluctuance noted to R and L heels.


Both heels observed floated with pillows Off mattress.





Tx.Plan: 


Apply Moisture Barrier Paste to Sacrum. Cover with Optifoam drsg. Change every 

3 days and prn.


            


Apply Cavilon Skin Barrier to both heels. Cover each heel with Optifoam drsg. 

Change every 7 days and prn.


            


Reposition at least every 2hours or as tolerated.


            


Off-load heels with pillow.


            


APM/VIK Mattress overlay.





MRI spine noted


neuro input appreciated 





(2) Deep tissue injury


(3) Malnutrition


Assessment & Plan:  DAILY ESTIMATED NEEDS:


Needs based on DM 61.5kg  


25-30  kcals/kg 


9261-1712  total kcals


1.25-1.5  g protein/kg


77-92  g total protein 


25-30  mL/kg


4322-2722  total fluid mLs





NUTRITION DIAGNOSIS:


Altered nutrition related lab values r/t DKA, clinical status as evidenced


by A1C 10.4,  on adm, (+) acetone, now w/ critically elev Na(161*)


and low K(2.4*).





[facility]     





PO DIET RECOMMENDATIONS:


[facility] per [facility]  





ADDITIONAL RECOMMENDATIONS:


1) W/ variable po intake add Glucerna 1 tetra per day


    Add high pro/1 carb snacks in b/w meals   


2) On D5 for hydration, monitor BG  


3) Wound care: Add JONATHAN BID + Vit C 250mg daily (f/up w/ WC eval) 


4) Obtain a calibrated bed scale wts 


    EMR wt: 110#      vs        Bed scale wt: 135# 





(4) Abnormal thyroid blood test


(5) Electrolyte imbalance


Assessment & Plan:  1.  No pulmonary embolus, somewhat limited at the bases due 

to motion.


2.  Bibasilar lung atelectasis/airspace disease.


3.  3 mm nodule along the right middle lobe fissure. 3.6 mm nodule left 


lower lobe along the fissure.  Fleischner Society Guidelines for low-risk 


patients, no follow-up is necessary.  For high-risk patients (smoking 


history or other known risk factors) an optional chest CT at 12 months 


could be performed.


4.  Mild bilateral perinephric stranding.  May be senescent, correlate 


for infection.


5.  Distended gallbladder with hyperdensity may be sludge or stones.


6.  Prominent left lobe of the liver is slightly nodular in appearance.





US ordered 





(6) Subdural hematoma


Assessment & Plan:  as per neurology


MRI noted


bm biopsy pending 














Tomas Adair Apr 21, 2020 22:21

## 2020-04-21 NOTE — IMMEDIATE POST-OP EVALUATION
Immediate Post-Op Evalulation


Immediate Post-Op Evalulation


Procedure:  egd


Date of Evaluation:  Apr 21, 2020


Time of Evaluation:  11:59


IV Fluids:  100ml 0.9ns


Blood Products:  none


Estimated Blood Loss:  negligible


Blood Pressure Systolic:  102


Blood Pressure Diastolic:  54


Pulse Rate:  64


Respiratory Rate:  18


O2 Sat by Pulse Oximetry:  100


Temperature (Fahrenheit):  98.0


Pain Score (1-10):  0


Nausea:  No


Vomiting:  No


Complications


none


Patient Status:  awake, reacts, patent


Hydration Status:  adequate


Drug:  Mitali Ndiaye MD Apr 21, 2020 11:54

## 2020-04-22 VITALS — DIASTOLIC BLOOD PRESSURE: 79 MMHG | SYSTOLIC BLOOD PRESSURE: 119 MMHG

## 2020-04-22 VITALS — DIASTOLIC BLOOD PRESSURE: 66 MMHG | SYSTOLIC BLOOD PRESSURE: 118 MMHG

## 2020-04-22 VITALS — DIASTOLIC BLOOD PRESSURE: 62 MMHG | SYSTOLIC BLOOD PRESSURE: 113 MMHG

## 2020-04-22 VITALS — SYSTOLIC BLOOD PRESSURE: 121 MMHG | DIASTOLIC BLOOD PRESSURE: 65 MMHG

## 2020-04-22 VITALS — DIASTOLIC BLOOD PRESSURE: 70 MMHG | SYSTOLIC BLOOD PRESSURE: 121 MMHG

## 2020-04-22 VITALS — DIASTOLIC BLOOD PRESSURE: 67 MMHG | SYSTOLIC BLOOD PRESSURE: 121 MMHG

## 2020-04-22 VITALS — SYSTOLIC BLOOD PRESSURE: 120 MMHG | DIASTOLIC BLOOD PRESSURE: 76 MMHG

## 2020-04-22 LAB
ADD MANUAL DIFF: NO
ALBUMIN SERPL-MCNC: 2.1 G/DL (ref 3.4–5)
ALBUMIN/GLOB SERPL: 0.5 {RATIO} (ref 1–2.7)
ALP SERPL-CCNC: 139 U/L (ref 46–116)
ALT SERPL-CCNC: 75 U/L (ref 12–78)
ANION GAP SERPL CALC-SCNC: 8 MMOL/L (ref 5–15)
AST SERPL-CCNC: 50 U/L (ref 15–37)
BILIRUB SERPL-MCNC: 0.3 MG/DL (ref 0.2–1)
BUN SERPL-MCNC: 22 MG/DL (ref 7–18)
CALCIUM SERPL-MCNC: 8.8 MG/DL (ref 8.5–10.1)
CHLORIDE SERPL-SCNC: 102 MMOL/L (ref 98–107)
CO2 SERPL-SCNC: 27 MMOL/L (ref 21–32)
CREAT SERPL-MCNC: 0.5 MG/DL (ref 0.55–1.3)
ERYTHROCYTE [DISTWIDTH] IN BLOOD BY AUTOMATED COUNT: 13.4 % (ref 11.6–14.8)
GLOBULIN SER-MCNC: 4.3 G/DL
HCT VFR BLD CALC: 34.8 % (ref 37–47)
HGB BLD-MCNC: 12.5 G/DL (ref 12–16)
MCV RBC AUTO: 89 FL (ref 80–99)
PLATELET # BLD: 250 K/UL (ref 150–450)
POTASSIUM SERPL-SCNC: 4.3 MMOL/L (ref 3.5–5.1)
RBC # BLD AUTO: 3.92 M/UL (ref 4.2–5.4)
SODIUM SERPL-SCNC: 137 MMOL/L (ref 136–145)
WBC # BLD AUTO: 6.6 K/UL (ref 4.8–10.8)

## 2020-04-22 RX ADMIN — TRAMADOL HYDROCHLORIDE PRN MG: 50 TABLET, FILM COATED ORAL at 19:55

## 2020-04-22 RX ADMIN — DEXAMETHASONE SODIUM PHOSPHATE SCH MG: 4 INJECTION, SOLUTION INTRA-ARTICULAR; INTRALESIONAL; INTRAMUSCULAR; INTRAVENOUS; SOFT TISSUE at 17:33

## 2020-04-22 RX ADMIN — VANCOMYCIN HYDROCHLORIDE SCH MG: 500 INJECTION, POWDER, LYOPHILIZED, FOR SOLUTION INTRAVENOUS at 17:33

## 2020-04-22 RX ADMIN — DEXAMETHASONE SODIUM PHOSPHATE SCH MG: 4 INJECTION, SOLUTION INTRA-ARTICULAR; INTRALESIONAL; INTRAMUSCULAR; INTRAVENOUS; SOFT TISSUE at 05:47

## 2020-04-22 RX ADMIN — Medication SCH TAB: at 11:17

## 2020-04-22 RX ADMIN — Medication SCH TAB: at 17:34

## 2020-04-22 RX ADMIN — INSULIN ASPART SCH UNITS: 100 INJECTION, SOLUTION INTRAVENOUS; SUBCUTANEOUS at 16:50

## 2020-04-22 RX ADMIN — INSULIN ASPART SCH UNITS: 100 INJECTION, SOLUTION INTRAVENOUS; SUBCUTANEOUS at 22:06

## 2020-04-22 RX ADMIN — INSULIN ASPART SCH UNITS: 100 INJECTION, SOLUTION INTRAVENOUS; SUBCUTANEOUS at 11:30

## 2020-04-22 RX ADMIN — VANCOMYCIN HYDROCHLORIDE SCH MG: 500 INJECTION, POWDER, LYOPHILIZED, FOR SOLUTION INTRAVENOUS at 13:23

## 2020-04-22 RX ADMIN — INSULIN DETEMIR SCH UNITS: 100 INJECTION, SOLUTION SUBCUTANEOUS at 22:06

## 2020-04-22 RX ADMIN — SODIUM CHLORIDE SCH MLS/HR: 9 INJECTION, SOLUTION INTRAVENOUS at 17:33

## 2020-04-22 RX ADMIN — VANCOMYCIN HYDROCHLORIDE SCH MG: 500 INJECTION, POWDER, LYOPHILIZED, FOR SOLUTION INTRAVENOUS at 11:17

## 2020-04-22 RX ADMIN — Medication SCH MG: at 11:18

## 2020-04-22 RX ADMIN — VANCOMYCIN HYDROCHLORIDE SCH MG: 500 INJECTION, POWDER, LYOPHILIZED, FOR SOLUTION INTRAVENOUS at 21:58

## 2020-04-22 RX ADMIN — INSULIN ASPART SCH UNITS: 100 INJECTION, SOLUTION INTRAVENOUS; SUBCUTANEOUS at 06:01

## 2020-04-22 RX ADMIN — INSULIN ASPART SCH UNITS: 100 INJECTION, SOLUTION INTRAVENOUS; SUBCUTANEOUS at 11:50

## 2020-04-22 RX ADMIN — SODIUM CHLORIDE SCH MLS/HR: 9 INJECTION, SOLUTION INTRAVENOUS at 05:29

## 2020-04-22 RX ADMIN — SODIUM CHLORIDE SCH MLS/HR: 0.9 INJECTION INTRAVENOUS at 11:16

## 2020-04-22 RX ADMIN — Medication SCH TAB: at 13:23

## 2020-04-22 RX ADMIN — Medication SCH MG: at 17:34

## 2020-04-22 RX ADMIN — INSULIN ASPART SCH UNITS: 100 INJECTION, SOLUTION INTRAVENOUS; SUBCUTANEOUS at 17:37

## 2020-04-22 RX ADMIN — DEXAMETHASONE SODIUM PHOSPHATE SCH MG: 4 INJECTION, SOLUTION INTRA-ARTICULAR; INTRALESIONAL; INTRAMUSCULAR; INTRAVENOUS; SOFT TISSUE at 13:24

## 2020-04-22 NOTE — NEUROLOGY PROGRESS NOTE
Interim History


Interim History


ROS Limited/Unobtainable:  No


Interim History


extensive work up, no source for malignancy, will need biopsy of spinal lesion 

vs resection





Objective


Physical Exam





Last Vital Signs








  Date Time  Temp Pulse Resp B/P (MAP) Pulse Ox O2 Delivery O2 Flow Rate FiO2


 


4/22/20 16:00 98.0 90 17 119/79 (92) 98   


 


4/22/20 09:00      Room Air  


 


4/21/20 12:35       3 











Laboratory Tests








Test


  4/22/20


05:10


 


White Blood Count


  6.6 K/UL


(4.8-10.8)


 


Red Blood Count


  3.92 M/UL


(4.20-5.40)  L


 


Hemoglobin


  12.5 G/DL


(12.0-16.0)


 


Hematocrit


  34.8 %


(37.0-47.0)  L


 


Mean Corpuscular Volume 89 FL (80-99)  


 


Mean Corpuscular Hemoglobin


  32.0 PG


(27.0-31.0)  H


 


Mean Corpuscular Hemoglobin


Concent 36.0 G/DL


(32.0-36.0)


 


Red Cell Distribution Width


  13.4 %


(11.6-14.8)


 


Platelet Count


  250 K/UL


(150-450)


 


Mean Platelet Volume


  6.9 FL


(6.5-10.1)


 


Neutrophils (%) (Auto)


  % (45.0-75.0)


 


 


Lymphocytes (%) (Auto)


  % (20.0-45.0)


 


 


Monocytes (%) (Auto)  % (1.0-10.0)  


 


Eosinophils (%) (Auto)  % (0.0-3.0)  


 


Basophils (%) (Auto)  % (0.0-2.0)  


 


Sodium Level


  137 MMOL/L


(136-145)


 


Potassium Level


  4.3 MMOL/L


(3.5-5.1)


 


Chloride Level


  102 MMOL/L


()


 


Carbon Dioxide Level


  27 MMOL/L


(21-32)


 


Anion Gap


  8 mmol/L


(5-15)


 


Blood Urea Nitrogen


  22 mg/dL


(7-18)  H


 


Creatinine


  0.5 MG/DL


(0.55-1.30)  L


 


Estimat Glomerular Filtration


Rate > 60 mL/min


(>60)


 


Glucose Level


  188 MG/DL


()  H


 


Calcium Level


  8.8 MG/DL


(8.5-10.1)


 


Total Bilirubin


  0.3 MG/DL


(0.2-1.0)


 


Aspartate Amino Transf


(AST/SGOT) 50 U/L (15-37)


H


 


Alanine Aminotransferase


(ALT/SGPT) 75 U/L (12-78)


 


 


Alkaline Phosphatase


  139 U/L


()  H


 


Total Protein


  6.4 G/DL


(6.4-8.2)


 


Albumin


  2.1 G/DL


(3.4-5.0)  L


 


Globulin 4.3 g/dL  


 


Albumin/Globulin Ratio


  0.5 (1.0-2.7)


L


 


Carcinoembryonic Antigen Pending  


 


CA 19-9 Antigen Pending  








Head:  normocophalic


Neck:  no rigidity


EENT:  benign





Neurologic Exam


Mental Status:  awake


Speech:  normal speech


Language:  normal language


Cranial Nerve VII:  no facial asymmetry


Objective


alert, oriented x 2, conversant


UEs 5/5


LEs 1/5 flaccid 


cc 35 min





Impression/Recommendations


Problems:  


(1) STEMI (ST elevation myocardial infarction)


(2) Atrial fibrillation with rapid ventricular response


(3) Prolonged Q-T interval on ECG


(4) DKA (diabetic ketoacidoses)


(5) Hypernatremia


(6) UTI (urinary tract infection)


(7) Subdural hematoma


Diagnostic Impression





Given LE weakness , suspect myelopathy, mri cervical thoracic and lumbar ordered

- called radiology to expedite, imaging still not in system to review





 keppra to 250 mg bid


SBP < 150


PT OT


consider Osmin Chao MD Apr 22, 2020 18:53

## 2020-04-22 NOTE — NEPHROLOGY PROGRESS NOTE
Assessment/Plan


Problem List:  


(1) Hypernatremia


Assessment:  Improving





(2) UTI (urinary tract infection)


(3) Subdural hematoma


(4) DKA (diabetic ketoacidoses)


(5) Electrolyte imbalance


(6) C. difficile colitis


Assessment





Hypernatremia most likely due to free water deficit


Hypokalemia, hypophosphatemia


Hyperglycemia and DKA


Evidence of UTI


Right-sided weakness with 3 mm subdural hematoma found in CT scan


Plan





Patient has C. difficile colitis now


Lactobacillus added


Discontinue IV fluid


Discontinue Vincent catheter


Change Protonix to p.o.


Stop Neutra-Phos due to diarrhea





Potassium and phosphorus and magnesium supplement as needed


Diet started as the patient is more alert


Low dose of Lopressor


Monitor renal parameters and electrolytes


Neuro eval noted


Keep the blood pressure and blood sugar in check


Urine studies


Per orders





Subjective


ROS Limited/Unobtainable:  No


Constitutional:  Reports: malaise





Objective


Objective





Last 24 Hour Vital Signs








  Date Time  Temp Pulse Resp B/P (MAP) Pulse Ox O2 Delivery O2 Flow Rate FiO2


 


4/22/20 11:17  87  121/70    


 


4/22/20 09:00      Room Air  


 


4/22/20 08:00 97.3 87 17 121/70 (87) 98   


 


4/22/20 04:18 98.1 67 18 118/66 (83) 98   


 


4/22/20 00:00 97.3 71 18 121/65 (83) 98   


 


4/21/20 22:19 96.8       


 


4/21/20 22:18      Room Air  


 


4/21/20 20:31  72  132/75    


 


4/21/20 20:00 98.1 71 19 130/73 (92) 98   


 


4/21/20 16:00 96.8 72 18 132/75 (94) 98   


 


4/21/20 12:35 97.9 67 15 126/74 100 Nasal Cannula 3 


 


4/21/20 12:25  66 19 130/74 100 Nasal Cannula 3 


 


4/21/20 12:15  69 15 129/72 100 Nasal Cannula 3 


 


4/21/20 12:11  64 18  100   


 


4/21/20 12:07  65 17 111/59 100 Nasal Cannula 3 


 


4/21/20 12:02  65 16 127/72 100 Nasal Cannula 3 


 


4/21/20 11:57 97.6 64 18 102/64 100 Nasal Cannula 3 


 


4/21/20 11:54  64 18  100   

















Intake and Output  


 


 4/21/20 4/22/20





 19:00 07:00


 


Intake Total 630 ml 360 ml


 


Balance 630 ml 360 ml


 


  


 


Intake Oral 480 ml 


 


IV Total 150 ml 


 


Other  360 ml


 


# Voids 5 2


 


# Bowel Movements 4 3











Current Medications








 Medications


  (Trade)  Dose


 Ordered  Sig/Meghan


 Route


 PRN Reason  Start Time


 Stop Time Status Last Admin


Dose Admin


 


 Acetaminophen


  (Tylenol)  650 mg  Q6H  PRN


 ORAL


 Mild Pain (Pain Scale 1-3)  4/17/20 10:00


 5/17/20 09:59   


 


 


 Ascorbic Acid


  (Vitamin C)  250 mg  TWICE A  DAY


 ORAL


   4/16/20 18:00


 5/15/20 17:59  4/22/20 11:18


 


 


 Atorvastatin


 Calcium


  (Lipitor)  10 mg  BEDTIME


 ORAL


   4/16/20 21:00


 7/10/20 20:59  4/21/20 20:31


 


 


 Cefepime HCl 2 gm/


 Dextrose  55 ml @ 


 110 mls/hr  EVERY 12  HOURS


 IVPB


   4/21/20 21:00


 4/28/20 20:59  4/22/20 11:16


 


 


 Dexamethasone


 Sodium Phosphate


  (Decadron 4mg/ml


 vial)  4 mg  Q6HR


 IVP


   4/17/20 00:00


 7/16/20 00:00  4/22/20 05:47


 


 


 Dextrose


  (Dextrose 50%)  25 ml  Q30M  PRN


 IV


 Hypoglycemia  4/16/20 16:45


 7/9/20 21:44   


 


 


 Dextrose


  (Dextrose 50%)  50 ml  Q30M  PRN


 IV


 Hypoglycemia  4/16/20 16:45


 7/9/20 21:44   


 


 


 Gadobutrol


  (Gadavist)  7.5 mmol  NOW  PRN


 IV


 Radiology Procedure  4/22/20 08:45


 4/26/20 08:38   


 


 


 Insulin Aspart


  (NovoLOG)    AC+HS


 SUBQ


   4/16/20 21:00


 7/10/20 09:29  4/22/20 06:01


 


 


 Insulin Aspart


  (NovoLOG)  16 units  NOVOTIAC


 SUBQ


   4/21/20 16:50


 7/11/20 11:49  4/22/20 06:01


 


 


 Insulin Detemir


  (Levemir)  30 units  QHS


 SUBQ


   4/16/20 21:00


 7/11/20 20:59  4/21/20 21:26


 


 


 Lactobacillus


 Acidophilus


  (Culturelle)  1 tab  THREE TIMES A  DAY


 ORAL


   4/19/20 13:00


 7/18/20 12:59  4/22/20 11:17


 


 


 Levetiracetam


  (Keppra)  250 mg  Q12HR


 ORAL


   4/16/20 21:00


 5/29/20 20:59  4/22/20 11:17


 


 


 Lorazepam


  (Ativan)  1 mg  Q6H  PRN


 ORAL


 For Anxiety  4/17/20 08:30


 4/24/20 08:29   


 


 


 Metoprolol


 Tartrate


  (Lopressor)  25 mg  Q12HR


 ORAL


   4/16/20 21:00


 7/10/20 20:59  4/22/20 11:17


 


 


 Potassium Chloride


  (K-Dur)  40 meq  BID


 ORAL


   4/16/20 18:00


 7/12/20 09:14  4/22/20 11:17


 


 


 Tramadol HCl


  (Ultram)  25 mg  Q6H  PRN


 ORAL


 Severe Breakthru Pain (>7)  4/16/20 16:48


 4/23/20 16:47  4/21/20 21:49


 


 


 Vancomycin HCl


  (Firvanq)  125 mg  FOUR TIMES A  DAY


 ORAL


   4/16/20 18:00


 4/26/20 17:59  4/22/20 11:17


 


 


 Vancomycin HCl


  (Vanco rx to


 dose)  1 ea  DAILY  PRN


 MISC


 Per rx protocol  4/21/20 18:15


 5/21/20 18:14   


 


 


 Vancomycin HCl


 750 mg/Sodium


 Chloride  275 ml @ 


 183.333


 mls/hr  Q12HR@0600,1800


 IVPB


   4/22/20 06:00


 4/27/20 05:59  4/22/20 05:29


 





Laboratory Tests


4/21/20 17:00: CA 15-3 Antigen 13.4


4/22/20 05:10: 


White Blood Count 6.6, Red Blood Count 3.92L, Hemoglobin 12.5, Hematocrit 34.8L

, Mean Corpuscular Volume 89, Mean Corpuscular Hemoglobin 32.0H, Mean 

Corpuscular Hemoglobin Concent 36.0, Red Cell Distribution Width 13.4, Platelet 

Count 250, Mean Platelet Volume 6.9, Neutrophils (%) (Auto) , Lymphocytes (%) (

Auto) , Monocytes (%) (Auto) , Eosinophils (%) (Auto) , Basophils (%) (Auto) , 

Sodium Level 137, Potassium Level 4.3, Chloride Level 102, Carbon Dioxide Level 

27, Anion Gap 8, Blood Urea Nitrogen 22H, Creatinine 0.5L, Estimat Glomerular 

Filtration Rate > 60, Glucose Level 188H, Calcium Level 8.8, Total Bilirubin 0.3

, Aspartate Amino Transf (AST/SGOT) 50H, Alanine Aminotransferase (ALT/SGPT) 75

, Alkaline Phosphatase 139H, Total Protein 6.4, Albumin 2.1L, Globulin 4.3, 

Albumin/Globulin Ratio 0.5L, Carcinoembryonic Antigen [Pending], CA 19-9 

Antigen [Pending]


Height (Feet):  5


Height (Inches):  4.00


Weight (Pounds):  131


General Appearance:  no apparent distress


Objective


No change











Jonathan Jacobson MD Apr 22, 2020 11:29

## 2020-04-22 NOTE — NUR
NURSE NOTES:

Patient is in bed, awake and able to make needs known. IV sites intact and patent. Bed 
locked and in lowest position. Bed alarm on. Siderails padded for seizure precautions. Call 
light in reach. Will continue to monitor.

## 2020-04-22 NOTE — HEMATOLOGY/ONC PROGRESS NOTE
Assessment/Plan


Assessment/Plan


Assessment and Recs:


# Multilevel bone marrow signal abnormality, as described. Differential 

considerations include  metastatic neoplasm, infiltrative neoplasm such as 

myeloma or lymphoma, exuberant hematopoietic hyperplasia, less likely systemic 

metabolic disorders. Note that this is much more striking in the thoracic spine 

than it is in the lumbar and cervical segments study earlier


--> tumor markers have been ordered


--> spep and upep


--> bone marrow biopsy was done which shows a increase in Nk population, may be 

reactive v clonal process (nonspecific)


--> also ordered for ct c/a/p with iv contrast - reviewed, no primary tumor. 

Slightly more conspicuous, since prior exam of one week earlier, pleural-based 

nodules in the right costophrenic sulcus. Apparent interim growth over short 

perio suggests that this is inflammatory or an area of atelectasis. Unchanged 3 

mm left lower lobe subpleural nodule along the major fissure.


--> r/o hemolysis at this time, DIC


--> dw pcp, consider neurosurg eval if needs further biopsy


# Thrombocytopenia likely initially due to infection


--> plt counts slowly uptreding


--> plt 82-->105-->160-->200


# DKA (diabetic ketoacidoses)


--> as per endo, iss and accuchecks qac and qhs


--> hgb a1c goal <8, now 10.4


--> maintains on dexamethasone


# Hypernatremia


--> as per renal recs


--> continue ivfs


# UTI (urinary tract infection)


--> per id, on abx: ceftriaxone 


# Ground-level fall.


--> pt/ot as needed


# Nausea and vomiting.


--> zofran prn


# Right sided weakness


# Generalized Weakness


# SDH 3mm no midline shift


# Elevated d-dimer


# Tachycardia - resolved





Appreciate consultation and dw Rn





Subjective


Constitutional:  Denies: no symptoms, chills, fever, malaise, weakness, other


HEENT:  Denies: no symptoms, eye pain, blurred vision, tearing, double vision, 

ear pain, ear discharge, nose pain, nose congestion, throat pain, throat 

swelling, mouth pain, mouth swelling, other


Cardiovascular:  Denies: no symptoms, chest pain, edema, irregular heart rate, 

lightheadedness, palpitations, syncope, other


Respiratory:  Denies: no symptoms, cough, shortness of breath, SOB with 

excertion, SOB at rest, sputum, wheezing, other


Gastrointestinal/Abdominal:  Denies: no symptoms, abdomen distended, abdominal 

pain, black stools, tarry stools, blood in stool, constipated, diarrhea, 

difficulty swallowing, nausea, poor appetite, poor fluid intake, rectal bleeding

, vomiting, other


Genitourinary:  Denies: no symptoms, burning, discharge, frequency, flank pain, 

hematuria, incontinence, pain, urgency, other


Neurologic/Psychiatric:  Denies: no symptoms, anxiety, depressed, emotional 

problems, headache, numbness, paresthesia, pre-existing deficit, seizure, 

tingling, tremors, weakness, other


Endocrine:  Denies: no symptoms, excessive sweating, flushing, intolerance to 

cold, intolerance to heat, increased hunger, increased thirst, increased urine, 

unexplained weight gain, unexplained weight loss, other


Allergies:  


Coded Allergies:  


     No Known Allergies (Unverified , 4/15/15)


Subjective


4/17 cxr and labs reviewed, on room air, bp stable, bone marrow biopsy ordered 


4/19 med surg, no overnight events, ct and labs reviewed, on abx 


4/20 labs are noted, no bleeding hgb 11, plt much better


4/21 labs slowly improving, remains on abx as per id, no bleeding


4/22 labs noted, still with lower ext weakness, dw neuro, pcp, may need 

neurosurg eval





Objective


Objective





Current Medications








 Medications


  (Trade)  Dose


 Ordered  Sig/Meghan


 Route


 PRN Reason  Start Time


 Stop Time Status Last Admin


Dose Admin


 


 Acetaminophen


  (Tylenol)  650 mg  Q6H  PRN


 ORAL


 Mild Pain (Pain Scale 1-3)  4/17/20 10:00


 5/17/20 09:59   


 


 


 Ascorbic Acid


  (Vitamin C)  250 mg  TWICE A  DAY


 ORAL


   4/16/20 18:00


 5/15/20 17:59  4/21/20 19:10


 


 


 Atorvastatin


 Calcium


  (Lipitor)  10 mg  BEDTIME


 ORAL


   4/16/20 21:00


 7/10/20 20:59  4/21/20 20:31


 


 


 Cefepime HCl 2 gm/


 Dextrose  55 ml @ 


 110 mls/hr  EVERY 12  HOURS


 IVPB


   4/21/20 21:00


 4/28/20 20:59  4/21/20 20:31


 


 


 Dexamethasone


 Sodium Phosphate


  (Decadron 4mg/ml


 vial)  4 mg  Q6HR


 IVP


   4/17/20 00:00


 7/16/20 00:00  4/22/20 05:47


 


 


 Dextrose


  (Dextrose 50%)  25 ml  Q30M  PRN


 IV


 Hypoglycemia  4/16/20 16:45


 7/9/20 21:44   


 


 


 Dextrose


  (Dextrose 50%)  50 ml  Q30M  PRN


 IV


 Hypoglycemia  4/16/20 16:45


 7/9/20 21:44   


 


 


 Insulin Aspart


  (NovoLOG)    AC+HS


 SUBQ


   4/16/20 21:00


 7/10/20 09:29  4/22/20 06:01


 


 


 Insulin Aspart


  (NovoLOG)  16 units  NOVOTIAC


 SUBQ


   4/21/20 16:50


 7/11/20 11:49  4/22/20 06:01


 


 


 Insulin Detemir


  (Levemir)  30 units  QHS


 SUBQ


   4/16/20 21:00


 7/11/20 20:59  4/21/20 21:26


 


 


 Lactobacillus


 Acidophilus


  (Culturelle)  1 tab  THREE TIMES A  DAY


 ORAL


   4/19/20 13:00


 7/18/20 12:59  4/21/20 19:08


 


 


 Levetiracetam


  (Keppra)  250 mg  Q12HR


 ORAL


   4/16/20 21:00


 5/29/20 20:59  4/21/20 20:31


 


 


 Lorazepam


  (Ativan)  1 mg  Q6H  PRN


 ORAL


 For Anxiety  4/17/20 08:30


 4/24/20 08:29   


 


 


 Metoprolol


 Tartrate


  (Lopressor)  25 mg  Q12HR


 ORAL


   4/16/20 21:00


 7/10/20 20:59  4/21/20 20:31


 


 


 Potassium Chloride


  (K-Dur)  40 meq  BID


 ORAL


   4/16/20 18:00


 7/12/20 09:14  4/21/20 19:09


 


 


 Tramadol HCl


  (Ultram)  25 mg  Q6H  PRN


 ORAL


 Severe Breakthru Pain (>7)  4/16/20 16:48


 4/23/20 16:47  4/21/20 21:49


 


 


 Vancomycin HCl


  (Firvanq)  125 mg  FOUR TIMES A  DAY


 ORAL


   4/16/20 18:00


 4/26/20 17:59  4/21/20 20:31


 


 


 Vancomycin HCl


  (Vanco rx to


 dose)  1 ea  DAILY  PRN


 MISC


 Per rx protocol  4/21/20 18:15


 5/21/20 18:14   


 


 


 Vancomycin HCl


 750 mg/Sodium


 Chloride  275 ml @ 


 183.333


 mls/hr  Q12HR@0600,1800


 IVPB


   4/22/20 06:00


 4/27/20 05:59  4/22/20 05:29


 











Last 24 Hour Vital Signs








  Date Time  Temp Pulse Resp B/P (MAP) Pulse Ox O2 Delivery O2 Flow Rate FiO2


 


4/22/20 04:18 98.1 67 18 118/66 (83) 98   


 


4/22/20 00:00 97.3 71 18 121/65 (83) 98   


 


4/21/20 22:19 96.8       


 


4/21/20 22:18      Room Air  


 


4/21/20 20:31  72  132/75    


 


4/21/20 20:00 98.1 71 19 130/73 (92) 98   


 


4/21/20 16:00 96.8 72 18 132/75 (94) 98   


 


4/21/20 12:35 97.9 67 15 126/74 100 Nasal Cannula 3 


 


4/21/20 12:25  66 19 130/74 100 Nasal Cannula 3 


 


4/21/20 12:15  69 15 129/72 100 Nasal Cannula 3 


 


4/21/20 12:11  64 18  100   


 


4/21/20 12:07  65 17 111/59 100 Nasal Cannula 3 


 


4/21/20 12:02  65 16 127/72 100 Nasal Cannula 3 


 


4/21/20 11:57 97.6 64 18 102/64 100 Nasal Cannula 3 


 


4/21/20 11:54  64 18  100   


 


4/21/20 09:00      Room Air  


 


4/21/20 08:00 97.2 73 18 107/59 (75) 95   


 


4/21/20 04:00 96.4 64 20 115/64 (81) 95   


 


4/21/20 00:00 98.0 67 20 121/65 (83) 100   


 


4/20/20 21:00      Room Air  


 


4/20/20 20:19  80  116/63    


 


4/20/20 19:53 97.2 80 20 116/63 (80) 94   


 


4/20/20 16:00 98.3 73 18 119/71 (87) 94   


 


4/20/20 12:00 98.3 79 18 121/72 (88) 94   


 


4/20/20 09:00      Room Air  


 


4/20/20 08:39  70  128/66    


 


4/20/20 08:00 96.4 78 18 118/62 (80) 94   

















Intake and Output  


 


 4/21/20 4/22/20





 19:00 07:00


 


Intake Total 630 ml 


 


Balance 630 ml 


 


  


 


Intake Oral 480 ml 


 


IV Total 150 ml 


 


# Voids 5 


 


# Bowel Movements 4 1











Labs








Test


  4/19/20


06:45 4/20/20


05:50 4/21/20


17:00 4/22/20


05:10


 


White Blood Count


  8.7 K/UL


(4.8-10.8) 9.8 K/UL


(4.8-10.8) 


  


 


 


Red Blood Count


  3.65 M/UL


(4.20-5.40) 3.58 M/UL


(4.20-5.40) 


  


 


 


Hemoglobin


  11.4 G/DL


(12.0-16.0) 11.1 G/DL


(12.0-16.0) 


  


 


 


Hematocrit


  32.1 %


(37.0-47.0) 31.3 %


(37.0-47.0) 


  


 


 


Mean Corpuscular Volume 88 FL (80-99)  88 FL (80-99)   


 


Mean Corpuscular Hemoglobin


  31.2 PG


(27.0-31.0) 31.2 PG


(27.0-31.0) 


  


 


 


Mean Corpuscular Hemoglobin


Concent 35.5 G/DL


(32.0-36.0) 35.6 G/DL


(32.0-36.0) 


  


 


 


Red Cell Distribution Width


  11.0 %


(11.6-14.8) 11.4 %


(11.6-14.8) 


  


 


 


Platelet Count


  151 K/UL


(150-450) 182 K/UL


(150-450) 


  


 


 


Mean Platelet Volume


  7.4 FL


(6.5-10.1) 7.2 FL


(6.5-10.1) 


  


 


 


Neutrophils (%) (Auto)


  84.0 %


(45.0-75.0) 84.8 %


(45.0-75.0) 


  


 


 


Lymphocytes (%) (Auto)


  12.8 %


(20.0-45.0) 11.4 %


(20.0-45.0) 


  


 


 


Monocytes (%) (Auto)


  3.0 %


(1.0-10.0) 3.5 %


(1.0-10.0) 


  


 


 


Eosinophils (%) (Auto)


  0.0 %


(0.0-3.0) 0.0 %


(0.0-3.0) 


  


 


 


Basophils (%) (Auto)


  0.2 %


(0.0-2.0) 0.3 %


(0.0-2.0) 


  


 


 


Sodium Level


  143 MMOL/L


(136-145) 140 MMOL/L


(136-145) 


  


 


 


Potassium Level


  4.0 MMOL/L


(3.5-5.1) 4.2 MMOL/L


(3.5-5.1) 


  


 


 


Chloride Level


  107 MMOL/L


() 106 MMOL/L


() 


  


 


 


Carbon Dioxide Level


  26 MMOL/L


(21-32) 26 MMOL/L


(21-32) 


  


 


 


Anion Gap


  10 mmol/L


(5-15) 8 mmol/L


(5-15) 


  


 


 


Blood Urea Nitrogen


  15 mg/dL


(7-18) 13 mg/dL


(7-18) 


  


 


 


Creatinine


  0.4 MG/DL


(0.55-1.30) 0.5 MG/DL


(0.55-1.30) 


  


 


 


Estimat Glomerular Filtration


Rate > 60 mL/min


(>60) > 60 mL/min


(>60) 


  


 


 


Glucose Level


  188 MG/DL


() 197 MG/DL


() 


  


 


 


Calcium Level


  8.1 MG/DL


(8.5-10.1) 8.1 MG/DL


(8.5-10.1) 


  


 


 


Thyroid Stimulating Hormone


(TSH) 0.099 uiU/mL


(0.358-3.740) 


  


  


 


 


Free Thyroxine


  1.14 NG/DL


(0.76-1.46) 


  


  


 


 


Uric Acid


  


  2.0 MG/DL


(2.6-7.2) 


  


 


 


Phosphorus Level


  


  3.2 MG/DL


(2.5-4.9) 


  


 


 


Magnesium Level


  


  2.2 MG/DL


(1.8-2.4) 


  


 


 


Total Bilirubin


  


  0.1 MG/DL


(0.2-1.0) 


  


 


 


Gamma Glutamyl Transpeptidase  156 U/L (5-85)   


 


Aspartate Amino Transf


(AST/SGOT) 


  41 U/L (15-37) 


  


  


 


 


Alanine Aminotransferase


(ALT/SGPT) 


  66 U/L (12-78) 


  


  


 


 


Alkaline Phosphatase


  


  153 U/L


() 


  


 


 


C-Reactive Protein,


Quantitative 


  0.7 mg/dL


(0.00-0.90) 


  


 


 


Pro-B-Type Natriuretic Peptide


  


  234 pg/mL


(0-125) 


  


 


 


Total Protein


  


  6.2 G/DL


(6.4-8.2) 


  


 


 


Albumin


  


  1.8 G/DL


(3.4-5.0) 


  


 


 


Globulin  4.4 g/dL   


 


Albumin/Globulin Ratio  0.4 (1.0-2.7)   








Height (Feet):  5


Height (Inches):  4.00


Weight (Pounds):  131


Objective


Physical Exam


General: Awake and somnolent


HEENT: NC/AT. EOMI. dry mucous membranes


Cardiovascular: RRR.  S1 and S2 normal.


Resp: Normal work of breathing. No cough, wheezing or crackles appreciated


Abdomen: Abdomen is soft, nondistended


Skin: Intact.  No abrasions, laceration or rash over the exposed skin


MSK: Normal tone and bulk.


Neuro: Awake and alert. Lower ext weakness











Dmitry Levin MD Apr 22, 2020 06:20

## 2020-04-22 NOTE — GENERAL PROGRESS NOTE
Assessment/Plan


Problem List:  


(1) Abnormal thyroid blood test


ICD Codes:  R79.89 - Other specified abnormal findings of blood chemistry


SNOMED:  739204894, 280016976433480


(2) Diabetes mellitus out of control


ICD Codes:  E11.65 - Type 2 diabetes mellitus with hyperglycemia


SNOMED:  22576435, 515678389


(3) DKA (diabetic ketoacidoses)


ICD Codes:  E11.10 - Type 2 diabetes mellitus with ketoacidosis without coma


SNOMED:  265452703, 28530627


(4) STEMI (ST elevation myocardial infarction)


ICD Codes:  I21.3 - ST elevation (STEMI) myocardial infarction of unspecified 

site


SNOMED:  208052944


(5) Prolonged Q-T interval on ECG


ICD Codes:  R94.31 - Abnormal electrocardiogram [ECG] [EKG]


SNOMED:  435317409


(6) Atrial fibrillation with rapid ventricular response


ICD Codes:  I48.91 - Unspecified atrial fibrillation


SNOMED:  018445458400015


(7) Hypernatremia


ICD Codes:  E87.0 - Hyperosmolality and hypernatremia


SNOMED:  574913294, 47780406


Assessment/Plan:


continue Levemir 30 units qhs 


continue Novolog 16 units ac tid 


continue NISS ac / hs 





TSH still suppressed, free T4 normalized 


continue to hold thyroxine





Subjective


Allergies:  


Coded Allergies:  


     No Known Allergies (Unverified , 4/15/15)


Subjective


events noted 


interval notes reviewed 











Item Value  Date Time


 


Bedside Blood Glucose 176 mg/dl H 4/22/20 0631


 


Bedside Blood Glucose 165 mg/dl H 4/21/20 2219


 


Bedside Blood Glucose 83 mg/dl 4/21/20 1656


 


Bedside Blood Glucose 102 mg/dl 4/21/20 0550











Objective





Last 24 Hour Vital Signs








  Date Time  Temp Pulse Resp B/P (MAP) Pulse Ox O2 Delivery O2 Flow Rate FiO2


 


4/22/20 04:18 98.1 67 18 118/66 (83) 98   


 


4/22/20 00:00 97.3 71 18 121/65 (83) 98   


 


4/21/20 22:19 96.8       


 


4/21/20 22:18      Room Air  


 


4/21/20 20:31  72  132/75    


 


4/21/20 20:00 98.1 71 19 130/73 (92) 98   


 


4/21/20 16:00 96.8 72 18 132/75 (94) 98   


 


4/21/20 12:35 97.9 67 15 126/74 100 Nasal Cannula 3 


 


4/21/20 12:25  66 19 130/74 100 Nasal Cannula 3 


 


4/21/20 12:15  69 15 129/72 100 Nasal Cannula 3 


 


4/21/20 12:11  64 18  100   


 


4/21/20 12:07  65 17 111/59 100 Nasal Cannula 3 


 


4/21/20 12:02  65 16 127/72 100 Nasal Cannula 3 


 


4/21/20 11:57 97.6 64 18 102/64 100 Nasal Cannula 3 


 


4/21/20 11:54  64 18  100   


 


4/21/20 09:00      Room Air  


 


4/21/20 08:00 97.2 73 18 107/59 (75) 95   

















Intake and Output  


 


 4/21/20 4/22/20





 19:00 07:00


 


Intake Total 630 ml 360 ml


 


Balance 630 ml 360 ml


 


  


 


Intake Oral 480 ml 


 


IV Total 150 ml 


 


Other  360 ml


 


# Voids 5 2


 


# Bowel Movements 4 3








Laboratory Tests


4/21/20 17:00: CA 15-3 Antigen [Pending]


4/22/20 05:10: 


White Blood Count 6.6, Red Blood Count 3.92L, Hemoglobin 12.5, Hematocrit 34.8L

, Mean Corpuscular Volume 89, Mean Corpuscular Hemoglobin 32.0H, Mean 

Corpuscular Hemoglobin Concent 36.0, Red Cell Distribution Width 13.4, Platelet 

Count 250, Mean Platelet Volume 6.9, Neutrophils (%) (Auto) , Lymphocytes (%) (

Auto) , Monocytes (%) (Auto) , Eosinophils (%) (Auto) , Basophils (%) (Auto) , 

Sodium Level 137, Potassium Level 4.3, Chloride Level 102, Carbon Dioxide Level 

27, Anion Gap 8, Blood Urea Nitrogen 22H, Creatinine 0.5L, Estimat Glomerular 

Filtration Rate > 60, Glucose Level 188H, Calcium Level 8.8, Total Bilirubin 0.3

, Aspartate Amino Transf (AST/SGOT) 50H, Alanine Aminotransferase (ALT/SGPT) 75

, Alkaline Phosphatase 139H, Total Protein 6.4, Albumin 2.1L, Globulin 4.3, 

Albumin/Globulin Ratio 0.5L, Carcinoembryonic Antigen [Pending], CA 19-9 

Antigen [Pending]


Height (Feet):  5


Height (Inches):  4.00


Weight (Pounds):  131


General Appearance:  no apparent distress


Neck:  normal alignment


Cardiovascular:  normal rate


Respiratory/Chest:  decreased breath sounds


Abdomen:  normal bowel sounds


Objective





Current Medications








 Medications


  (Trade)  Dose


 Ordered  Sig/Meghan


 Route


 PRN Reason  Start Time


 Stop Time Status Last Admin


Dose Admin


 


 Acetaminophen


  (Tylenol)  650 mg  Q6H  PRN


 ORAL


 Mild Pain (Pain Scale 1-3)  4/17/20 10:00


 5/17/20 09:59   


 


 


 Ascorbic Acid


  (Vitamin C)  250 mg  TWICE A  DAY


 ORAL


   4/16/20 18:00


 5/15/20 17:59  4/21/20 19:10


 


 


 Atorvastatin


 Calcium


  (Lipitor)  10 mg  BEDTIME


 ORAL


   4/16/20 21:00


 7/10/20 20:59  4/21/20 20:31


 


 


 Cefepime HCl 2 gm/


 Dextrose  55 ml @ 


 110 mls/hr  EVERY 12  HOURS


 IVPB


   4/21/20 21:00


 4/28/20 20:59  4/21/20 20:31


 


 


 Dexamethasone


 Sodium Phosphate


  (Decadron 4mg/ml


 vial)  4 mg  Q6HR


 IVP


   4/17/20 00:00


 7/16/20 00:00  4/22/20 05:47


 


 


 Dextrose


  (Dextrose 50%)  25 ml  Q30M  PRN


 IV


 Hypoglycemia  4/16/20 16:45


 7/9/20 21:44   


 


 


 Dextrose


  (Dextrose 50%)  50 ml  Q30M  PRN


 IV


 Hypoglycemia  4/16/20 16:45


 7/9/20 21:44   


 


 


 Insulin Aspart


  (NovoLOG)    AC+HS


 SUBQ


   4/16/20 21:00


 7/10/20 09:29  4/22/20 06:01


 


 


 Insulin Aspart


  (NovoLOG)  16 units  NOVOTIAC


 SUBQ


   4/21/20 16:50


 7/11/20 11:49  4/22/20 06:01


 


 


 Insulin Detemir


  (Levemir)  30 units  QHS


 SUBQ


   4/16/20 21:00


 7/11/20 20:59  4/21/20 21:26


 


 


 Lactobacillus


 Acidophilus


  (Culturelle)  1 tab  THREE TIMES A  DAY


 ORAL


   4/19/20 13:00


 7/18/20 12:59  4/21/20 19:08


 


 


 Levetiracetam


  (Keppra)  250 mg  Q12HR


 ORAL


   4/16/20 21:00


 5/29/20 20:59  4/21/20 20:31


 


 


 Lorazepam


  (Ativan)  1 mg  Q6H  PRN


 ORAL


 For Anxiety  4/17/20 08:30


 4/24/20 08:29   


 


 


 Metoprolol


 Tartrate


  (Lopressor)  25 mg  Q12HR


 ORAL


   4/16/20 21:00


 7/10/20 20:59  4/21/20 20:31


 


 


 Potassium Chloride


  (K-Dur)  40 meq  BID


 ORAL


   4/16/20 18:00


 7/12/20 09:14  4/21/20 19:09


 


 


 Tramadol HCl


  (Ultram)  25 mg  Q6H  PRN


 ORAL


 Severe Breakthru Pain (>7)  4/16/20 16:48


 4/23/20 16:47  4/21/20 21:49


 


 


 Vancomycin HCl


  (Firvanq)  125 mg  FOUR TIMES A  DAY


 ORAL


   4/16/20 18:00


 4/26/20 17:59  4/21/20 20:31


 


 


 Vancomycin HCl


  (Vanco rx to


 dose)  1 ea  DAILY  PRN


 MISC


 Per rx protocol  4/21/20 18:15


 5/21/20 18:14   


 


 


 Vancomycin HCl


 750 mg/Sodium


 Chloride  275 ml @ 


 183.333


 mls/hr  Q12HR@0600,1800


 IVPB


   4/22/20 06:00


 4/27/20 05:59  4/22/20 05:29


 

















Kevin Mohr MD Apr 22, 2020 07:02

## 2020-04-22 NOTE — INFECTIOUS DISEASES PROG NOTE
Assessment/Plan


Assessment/Plan





ASSESSMENT AND PLAN:





1. e.coli uti/pyelonephritis with bacteremia, sepsis, fevers, gram neg sepsis 


    c.diff. +, diarrhea


    ? thoracic spine vertebral osteomyelitis/discitis, ? other, ? tumor/

malignancy 


    CVA/subdural hematoma on brain MRI - meningitis and tumor in differential 





   - abx changed to vancomycin and cefepime to cover possible vertebral 

osteomyelitis (will also cover bacterial meningitis)


   - po vancomycin - day # 9, needs at least 14 days treatment and possible 

longer depending on length of concomitant abx tx 


   - plan on vertebral biopsy and culture 


   - surveillance blood cultures negative 


   - monitor labs and temperatures 


   - sepsis improved 


   - d/w Dr. Ochoa 





2. DKA.


3. Hypernatremia.


4. Diabetes type 2.


5. Ground-level fall.


6. Nausea and vomiting.


7. Diarrhea.


8. We will check stool studies.


9. Noncompliance.


10. Right-sided weakness.


11. Subdural hematoma with midline shift.


12. No known drug allergies.


13. Social history negative.


14. Family history noncontributory.


15. MAR was noted.


16. Case discussed with RN.


17. Continue treatment per primary consultants.


18. Orders were noted and entered.





Subjective


Constitutional:  Denies: fever


Respiratory:  Denies: shortness of breath


Cardiovascular:  Denies: chest pain


Gastrointestinal/Abdominal:  Denies: nausea, vomiting


Genitourinary:  Reports: other - no bruner


Neurologic:  Reports: weakness


Psychiatric:  Reports: other - NA


Skin:  Denies: rash


Musculoskeletal:  Reports: pain


Allergies:  


Coded Allergies:  


     No Known Allergies (Unverified , 4/15/15)





Objective


Vital Signs





Last 24 Hour Vital Signs








  Date Time  Temp Pulse Resp B/P (MAP) Pulse Ox O2 Delivery O2 Flow Rate FiO2


 


4/22/20 16:00 98.0 90 17 119/79 (92) 98   


 


4/22/20 12:00 97.6 94 18 120/76 (91) 99   


 


4/22/20 11:17  87  121/70    


 


4/22/20 09:00      Room Air  


 


4/22/20 08:00 97.3 87 17 121/70 (87) 98   


 


4/22/20 04:18 98.1 67 18 118/66 (83) 98   


 


4/22/20 00:00 97.3 71 18 121/65 (83) 98   


 


4/21/20 22:19 96.8       


 


4/21/20 22:18      Room Air  


 


4/21/20 20:31  72  132/75    


 


4/21/20 20:00 98.1 71 19 130/73 (92) 98   








Height (Feet):  5


Height (Inches):  4.00


Weight (Pounds):  131


General Appearance:  no acute distress


HEENT:  normocephalic, atraumatic, anicteric, mucous membranes moist


Respiratory/Chest:  lungs clear, normal breath sounds, no respiratory distress, 

no accessory muscle use


Cardiovascular:  normal rate, regular rhythm, no gallop/murmur, no JVD


Abdomen:  normal bowel sounds, soft, non tender, no organomegaly, non distended


Genitourinary:  other - no bruner


Extremities:  no cyanosis


Skin:  no rash


Neurologic/Psychiatric:  CNs II-XII grossly normal, alert, responsive, other - 

able to move LE but weakness


Lymphatic:  no neck adenopathy


Musculoskeletal:  normal muscle bulk


Objective





CT chest - 





IMPRESSION:     


1.  No pulmonary embolus, somewhat limited at the bases due to motion.


2.  Bibasilar lung atelectasis/airspace disease.


3.  3 mm nodule along the right middle lobe fissure. 3.6 mm nodule left 


lower lobe along the fissure.  Fleischner Society Guidelines for low-risk 


patients, no follow-up is necessary.  For high-risk patients (smoking 


history or other known risk factors) an optional chest CT at 12 months 


could be performed.


4.  Mild bilateral perinephric stranding.  May be senescent, correlate 


for infection.


5.  Distended gallbladder with hyperdensity may be sludge or stones.


6.  Prominent left lobe of the liver is slightly nodular in appearance.





 





CT abdomen and pelvis:





IMPRESSION:     


1.  Urinary bladder distention.


2.  Mild heterogeneity of the lower pole of the left kidney may be 


artifactual.  Infection is not excluded.  Consider correlation with 


urinalysis, as clinically indicated.  Mild bilateral renal pelviectasis 


and prominence of bilateral ureters is likely related to distention of 


the urinary bladder.


 





MRI imaging of spine and brain - reports reviewed





Microbiology








 Date/Time


Source Procedure


Growth Status


 


 


 4/12/20 15:15


Blood Blood Culture - Final


NO GROWTH AFTER 5 DAYS Complete


 


 4/14/20 18:50


Stool Clostridium difficile Toxin Assay - Final Complete


 


 4/10/20 20:40


Urine,Clean Catch Urine Culture - Final


Escherichia Coli Complete











Laboratory Tests








Test


  4/22/20


05:10


 


White Blood Count


  6.6 K/UL


(4.8-10.8)


 


Red Blood Count


  3.92 M/UL


(4.20-5.40)  L


 


Hemoglobin


  12.5 G/DL


(12.0-16.0)


 


Hematocrit


  34.8 %


(37.0-47.0)  L


 


Mean Corpuscular Volume 89 FL (80-99)  


 


Mean Corpuscular Hemoglobin


  32.0 PG


(27.0-31.0)  H


 


Mean Corpuscular Hemoglobin


Concent 36.0 G/DL


(32.0-36.0)


 


Red Cell Distribution Width


  13.4 %


(11.6-14.8)


 


Platelet Count


  250 K/UL


(150-450)


 


Mean Platelet Volume


  6.9 FL


(6.5-10.1)


 


Neutrophils (%) (Auto)


  % (45.0-75.0)


 


 


Lymphocytes (%) (Auto)


  % (20.0-45.0)


 


 


Monocytes (%) (Auto)  % (1.0-10.0)  


 


Eosinophils (%) (Auto)  % (0.0-3.0)  


 


Basophils (%) (Auto)  % (0.0-2.0)  


 


Sodium Level


  137 MMOL/L


(136-145)


 


Potassium Level


  4.3 MMOL/L


(3.5-5.1)


 


Chloride Level


  102 MMOL/L


()


 


Carbon Dioxide Level


  27 MMOL/L


(21-32)


 


Anion Gap


  8 mmol/L


(5-15)


 


Blood Urea Nitrogen


  22 mg/dL


(7-18)  H


 


Creatinine


  0.5 MG/DL


(0.55-1.30)  L


 


Estimat Glomerular Filtration


Rate > 60 mL/min


(>60)


 


Glucose Level


  188 MG/DL


()  H


 


Calcium Level


  8.8 MG/DL


(8.5-10.1)


 


Total Bilirubin


  0.3 MG/DL


(0.2-1.0)


 


Aspartate Amino Transf


(AST/SGOT) 50 U/L (15-37)


H


 


Alanine Aminotransferase


(ALT/SGPT) 75 U/L (12-78)


 


 


Alkaline Phosphatase


  139 U/L


()  H


 


Total Protein


  6.4 G/DL


(6.4-8.2)


 


Albumin


  2.1 G/DL


(3.4-5.0)  L


 


Globulin 4.3 g/dL  


 


Albumin/Globulin Ratio


  0.5 (1.0-2.7)


L


 


Carcinoembryonic Antigen Pending  


 


CA 19-9 Antigen Pending  











Current Medications








 Medications


  (Trade)  Dose


 Ordered  Sig/Meghan


 Route


 PRN Reason  Start Time


 Stop Time Status Last Admin


Dose Admin


 


 Acetaminophen


  (Tylenol)  650 mg  Q6H  PRN


 ORAL


 Mild Pain (Pain Scale 1-3)  4/17/20 10:00


 5/17/20 09:59   


 


 


 Ascorbic Acid


  (Vitamin C)  250 mg  TWICE A  DAY


 ORAL


   4/16/20 18:00


 5/15/20 17:59  4/22/20 17:34


 


 


 Atorvastatin


 Calcium


  (Lipitor)  10 mg  BEDTIME


 ORAL


   4/16/20 21:00


 7/10/20 20:59  4/21/20 20:31


 


 


 Cefepime HCl 2 gm/


 Dextrose  55 ml @ 


 110 mls/hr  EVERY 12  HOURS


 IVPB


   4/21/20 21:00


 4/28/20 20:59  4/22/20 11:16


 


 


 Dexamethasone


 Sodium Phosphate


  (Decadron 4mg/ml


 vial)  4 mg  Q6HR


 IVP


   4/17/20 00:00


 7/16/20 00:00  4/22/20 17:33


 


 


 Dextrose


  (Dextrose 50%)  25 ml  Q30M  PRN


 IV


 Hypoglycemia  4/16/20 16:45


 7/9/20 21:44   


 


 


 Dextrose


  (Dextrose 50%)  50 ml  Q30M  PRN


 IV


 Hypoglycemia  4/16/20 16:45


 7/9/20 21:44   


 


 


 Gadobutrol


  (Gadavist)  7.5 mmol  NOW  PRN


 IV


 Radiology Procedure  4/22/20 08:45


 4/26/20 08:38   


 


 


 Insulin Aspart


  (NovoLOG)    AC+HS


 SUBQ


   4/16/20 21:00


 7/10/20 09:29  4/22/20 17:37


 


 


 Insulin Aspart


  (NovoLOG)  16 units  NOVOTIAC


 SUBQ


   4/21/20 16:50


 7/11/20 11:49  4/22/20 16:50


 


 


 Insulin Detemir


  (Levemir)  30 units  QHS


 SUBQ


   4/16/20 21:00


 7/11/20 20:59  4/21/20 21:26


 


 


 Lactobacillus


 Acidophilus


  (Culturelle)  1 tab  THREE TIMES A  DAY


 ORAL


   4/19/20 13:00


 7/18/20 12:59  4/22/20 17:34


 


 


 Levetiracetam


  (Keppra)  250 mg  Q12HR


 ORAL


   4/16/20 21:00


 5/29/20 20:59  4/22/20 11:17


 


 


 Lidocaine HCl


  (Xylocaine 1%


 30ml)  30 ml  ONCE  PRN


 INJ


 RADIOLOGY  4/22/20 13:27


 4/23/20 23:59   


 


 


 Lorazepam


  (Ativan)  1 mg  Q6H  PRN


 ORAL


 For Anxiety  4/17/20 08:30


 4/24/20 08:29   


 


 


 Metoprolol


 Tartrate


  (Lopressor)  25 mg  Q12HR


 ORAL


   4/16/20 21:00


 7/10/20 20:59  4/22/20 11:17


 


 


 Potassium Chloride


  (K-Dur)  40 meq  BID


 ORAL


   4/16/20 18:00


 7/12/20 09:14  4/22/20 17:34


 


 


 Sodium Chloride  1,000 ml @ 


 75 mls/hr  [id number]


 IV


   4/23/20 00:00


 5/23/20 00:00   


 


 


 Tramadol HCl


  (Ultram)  25 mg  Q6H  PRN


 ORAL


 Severe Breakthru Pain (>7)  4/16/20 16:48


 4/23/20 16:47  4/21/20 21:49


 


 


 Vancomycin HCl


  (Firvanq)  125 mg  FOUR TIMES A  DAY


 ORAL


   4/16/20 18:00


 4/26/20 17:59  4/22/20 17:33


 


 


 Vancomycin HCl


  (Vanco rx to


 dose)  1 ea  DAILY  PRN


 MISC


 Per rx protocol  4/21/20 18:15


 5/21/20 18:14   


 


 


 Vancomycin HCl


 750 mg/Sodium


 Chloride  275 ml @ 


 183.333


 mls/hr  Q12HR@0600,1800


 IVPB


   4/22/20 06:00


 4/27/20 05:59  4/22/20 17:33


 

















Brijesh Looney MD Apr 22, 2020 18:18

## 2020-04-22 NOTE — NUR
NURSE NOTES:

Patient pulled out IV access. 2 new IVs inserted. L hand 24 gauge and R hand 24 gauge. 
Patient tolerated well.

## 2020-04-22 NOTE — SURGERY PROGRESS NOTE
Surgery Progress Note


Subjective


Symptoms:  improved





Objective





Last 24 Hour Vital Signs








  Date Time  Temp Pulse Resp B/P (MAP) Pulse Ox O2 Delivery O2 Flow Rate FiO2


 


4/22/20 11:17  87  121/70    


 


4/22/20 09:00      Room Air  


 


4/22/20 08:00 97.3 87 17 121/70 (87) 98   


 


4/22/20 04:18 98.1 67 18 118/66 (83) 98   


 


4/22/20 00:00 97.3 71 18 121/65 (83) 98   


 


4/21/20 22:19 96.8       


 


4/21/20 22:18      Room Air  


 


4/21/20 20:31  72  132/75    


 


4/21/20 20:00 98.1 71 19 130/73 (92) 98   


 


4/21/20 16:00 96.8 72 18 132/75 (94) 98   


 


4/21/20 12:35 97.9 67 15 126/74 100 Nasal Cannula 3 


 


4/21/20 12:25  66 19 130/74 100 Nasal Cannula 3 








I&O











Intake and Output  


 


 4/21/20 4/22/20





 19:00 07:00


 


Intake Total 630 ml 360 ml


 


Balance 630 ml 360 ml


 


  


 


Intake Oral 480 ml 


 


IV Total 150 ml 


 


Other  360 ml


 


# Voids 5 2


 


# Bowel Movements 4 3








Dressing:  dry


Wound:  clean


Cardiovascular:  RSR


Respiratory:  clear, decreased breath sounds


Abdomen:  soft, non-tender, present bowel sounds


Extremities:  no edema, no tenderness, no cyanosis





Laboratory Tests








Test


  4/21/20


17:00 4/22/20


05:10


 


CA 15-3 Antigen


  13.4 U/mL


(0.0-25.0) 


 


 


White Blood Count


  


  6.6 K/UL


(4.8-10.8)


 


Red Blood Count


  


  3.92 M/UL


(4.20-5.40)  L


 


Hemoglobin


  


  12.5 G/DL


(12.0-16.0)


 


Hematocrit


  


  34.8 %


(37.0-47.0)  L


 


Mean Corpuscular Volume  89 FL (80-99)  


 


Mean Corpuscular Hemoglobin


  


  32.0 PG


(27.0-31.0)  H


 


Mean Corpuscular Hemoglobin


Concent 


  36.0 G/DL


(32.0-36.0)


 


Red Cell Distribution Width


  


  13.4 %


(11.6-14.8)


 


Platelet Count


  


  250 K/UL


(150-450)


 


Mean Platelet Volume


  


  6.9 FL


(6.5-10.1)


 


Neutrophils (%) (Auto)


  


  % (45.0-75.0)


 


 


Lymphocytes (%) (Auto)


  


  % (20.0-45.0)


 


 


Monocytes (%) (Auto)   % (1.0-10.0)  


 


Eosinophils (%) (Auto)   % (0.0-3.0)  


 


Basophils (%) (Auto)   % (0.0-2.0)  


 


Sodium Level


  


  137 MMOL/L


(136-145)


 


Potassium Level


  


  4.3 MMOL/L


(3.5-5.1)


 


Chloride Level


  


  102 MMOL/L


()


 


Carbon Dioxide Level


  


  27 MMOL/L


(21-32)


 


Anion Gap


  


  8 mmol/L


(5-15)


 


Blood Urea Nitrogen


  


  22 mg/dL


(7-18)  H


 


Creatinine


  


  0.5 MG/DL


(0.55-1.30)  L


 


Estimat Glomerular Filtration


Rate 


  > 60 mL/min


(>60)


 


Glucose Level


  


  188 MG/DL


()  H


 


Calcium Level


  


  8.8 MG/DL


(8.5-10.1)


 


Total Bilirubin


  


  0.3 MG/DL


(0.2-1.0)


 


Aspartate Amino Transf


(AST/SGOT) 


  50 U/L (15-37)


H


 


Alanine Aminotransferase


(ALT/SGPT) 


  75 U/L (12-78)


 


 


Alkaline Phosphatase


  


  139 U/L


()  H


 


Total Protein


  


  6.4 G/DL


(6.4-8.2)


 


Albumin


  


  2.1 G/DL


(3.4-5.0)  L


 


Globulin  4.3 g/dL  


 


Albumin/Globulin Ratio


  


  0.5 (1.0-2.7)


L


 


Carcinoembryonic Antigen  Pending  


 


CA 19-9 Antigen  Pending  











Plan


Problems:  


(1) Decubitus skin ulcer


Assessment & Plan:  Patient noted to have rapid onset Sacral DTPI. Per Primary 

Nurse pt noted on prior shift to have non-blanching erythema as per report.  

All preventive measures were taken to reposition patient and off-load pressure.

  Despite repositioning and applying Moisture Barrier Paste patient noted to 

have further skin decline. 


Sacral DTPI is irregular shaped, Purple and indurated in colour over 

sacrococcygeal with surrounding non-blanching erythema along borders.(L)7.1cm x 

(W)6.5cm. 


Nutritional intake varies 30-45% daily. 


Non-Blanching erythema without fluctuance noted to R and L heels.


Both heels observed floated with pillows Off mattress.





Tx.Plan: 


Apply Moisture Barrier Paste to Sacrum. Cover with Optifoam drsg. Change every 

3 days and prn.


            


Apply Cavilon Skin Barrier to both heels. Cover each heel with Optifoam drsg. 

Change every 7 days and prn.


            


Reposition at least every 2hours or as tolerated.


            


Off-load heels with pillow.


            


APM/VIK Mattress overlay.





MRI spine noted


neuro input appreciated 





(2) Deep tissue injury


(3) Malnutrition


Assessment & Plan:  DAILY ESTIMATED NEEDS:


Needs based on DM 61.5kg  


25-30  kcals/kg 


9078-1794  total kcals


1.25-1.5  g protein/kg


77-92  g total protein 


25-30  mL/kg


8879-9253  total fluid mLs





NUTRITION DIAGNOSIS:


Altered nutrition related lab values r/t DKA, clinical status as evidenced


by A1C 10.4,  on adm, (+) acetone, now w/ critically elev Na(161*)


and low K(2.4*).





[facility] + [facility]     





PO DIET RECOMMENDATIONS:


[facility] diet/ texture per SLP  





ADDITIONAL RECOMMENDATIONS:


1) W/ variable po intake add Glucerna 1 tetra per day


    Add high pro/1 carb snacks in b/w meals   


2) On D5 for hydration, monitor BG  


3) Wound care: Add JONATHAN BID + Vit C 250mg daily (f/up w/ WC eval) 


4) Obtain a calibrated bed scale wts 


    EMR wt: 110#      vs        Bed scale wt: 135# 





(4) Abnormal thyroid blood test


(5) Electrolyte imbalance


Assessment & Plan:  1.  No pulmonary embolus, somewhat limited at the bases due 

to motion.


2.  Bibasilar lung atelectasis/airspace disease.


3.  3 mm nodule along the right middle lobe fissure. 3.6 mm nodule left 


lower lobe along the fissure.  Fleischner Society Guidelines for low-risk 


patients, no follow-up is necessary.  For high-risk patients (smoking 


history or other known risk factors) an optional chest CT at 12 months 


could be performed.


4.  Mild bilateral perinephric stranding.  May be senescent, correlate 


for infection.


5.  Distended gallbladder with hyperdensity may be sludge or stones.


6.  Prominent left lobe of the liver is slightly nodular in appearance.





US ordered 





(6) Subdural hematoma


Assessment & Plan:  as per neurology


MRI noted


bm biopsy pending 














Tomas Adair Apr 22, 2020 12:16

## 2020-04-22 NOTE — GENERAL PROGRESS NOTE
Assessment/Plan


Assessment/Plan:


65-year-old female with PMH of medical noncompliance, DM type II who presents 

with b/l weakness, N/V, GLF, on admission pt found to be in DKA with 's.





#DKA - resolved


#Uncontrolled DM, Hgb A1C 10.4


#E. coli UTI


#E. coli bacteremia


#c. diff colitis


-continue in-patient medical care


-s/p DKA protocol with improved AG, AG 21-> 14


-CT abd reviewed


-4/10: UCx e. coli


-4/10: BCx w/E. coli 2/2, repeat 4/12 BCx NGTD


-c.diff positive, contact precautions


-cont. accuchecks, ISS qAC/HS


-Endo following: Levemir 30 units qhs, Novolog to 15 units ac tid


-ID, Dr. Looney: CTX, s/p #10/10 of abx, PO vanco for c diff day #9 of 

total 14 days





#Multilevel Thoracic BM enhancements


#Meningeal Enhancements on Brain MRI


-suspicious for malignancy vs infectious process


-4/10: CT abd/pel w/o contrast reviewed with no abnormalities


-4/17 CT C/A/P without any lesions suspicious for primary malignancy


-CEA elevated


- wnl, BM biopsy w/increase in Nk population, may be reactive v clonal 

process (nonspecific)


-f/u PEP/UPEP results


-4/21: EGD negative, d/w GI, will need colonoscopy. Given c diff will likely 

need to do as o/p


-d/w Heme/Onc, reviewed imaging, no focal oncological process identified at 

this time, imaging may be more representative of inflammatory process


-Breast CA markers pending


-bone scan ordered by heme/onc


-d/w Neuro, unable to obtain LP given SDH


-d/w ID, HemeOnc, Neuro, plan to get thoracic spine biopsy to r/o infectious 

etiology likely tomorrow


-updated pt's sister, Carin Wheeler [phone], on POC, answered all 

questions to her satisfaction





#Midbrain Lacunar Infarct


#Right sided weakness and 


#Generalized B/L LE Weakness 2/2 above


#SDH 3mm no midline shift


-pt AOx3, pt noted fall 3 days PTA


-found on CT head


-4/14: CT head stable compared to previous


-d/w Neuro, no need for transfer at this time, cont. keppra


-cont. fall precautions


-holding ASA given SDH


-statin


-cont. PT treat and eval


-CM for d/c planning, plan for CRI on d/c


-MRI brain w/wo contrast reviewed


-d/w ID and radioliogy, plan for thoracic spine biopsy to r/o infectious 

etiology





#Elevated d-dimer


#Tachycardia - resolved


-likely multifactorial given infection/DKA, PE has been ruled out


-EKG w/NSR


-CTA thorax negative for PE


-tachycardia likely 2/2 dehydration, encourage PO intake


-no OAC at this time





#Hypernatremia - resolved


#Hypokalemia - improved


#Hypophosphatemia


-Hypernatremia likely 2/2 free water deficit


-replace electrolytes, cont. to monitor and replace PRN


-Nephro following, IVF and electrolytes per nephro





#Sacral Decubitus Ulcer


-Wound care following, recs appreciated





DVT - SCDs given SDH





Time spent: 35 minutes on this patient's case, d/w pt, family, RN on POC.


Spent additional 45 mins discussing case w/Dr. Looney, Dr. Cheney and 

Dr. Levin regarding MRI findings and POC.





Time of note may not reflect time of encounter.





Subjective


Allergies:  


Coded Allergies:  


     No Known Allergies (Unverified , 4/15/15)


Subjective


F/u DKA, UTI, hypernatremia, SDH, MRI enhancements on thoracic spine concerning 

for malignancy, MRI brain w/midbrain lacunar infarct.


Pt states arms remain weak but strength is improving. LE weakness L>R, states 

is slightly improving.





Objective





Last 24 Hour Vital Signs








  Date Time  Temp Pulse Resp B/P (MAP) Pulse Ox O2 Delivery O2 Flow Rate FiO2


 


4/22/20 04:18 98.1 67 18 118/66 (83) 98   


 


4/22/20 00:00 97.3 71 18 121/65 (83) 98   


 


4/21/20 22:19 96.8       


 


4/21/20 22:18      Room Air  


 


4/21/20 20:31  72  132/75    


 


4/21/20 20:00 98.1 71 19 130/73 (92) 98   


 


4/21/20 16:00 96.8 72 18 132/75 (94) 98   


 


4/21/20 12:35 97.9 67 15 126/74 100 Nasal Cannula 3 


 


4/21/20 12:25  66 19 130/74 100 Nasal Cannula 3 


 


4/21/20 12:15  69 15 129/72 100 Nasal Cannula 3 


 


4/21/20 12:11  64 18  100   


 


4/21/20 12:07  65 17 111/59 100 Nasal Cannula 3 


 


4/21/20 12:02  65 16 127/72 100 Nasal Cannula 3 


 


4/21/20 11:57 97.6 64 18 102/64 100 Nasal Cannula 3 


 


4/21/20 11:54  64 18  100   


 


4/21/20 09:00      Room Air  

















Intake and Output  


 


 4/21/20 4/22/20





 19:00 07:00


 


Intake Total 630 ml 360 ml


 


Balance 630 ml 360 ml


 


  


 


Intake Oral 480 ml 


 


IV Total 150 ml 


 


Other  360 ml


 


# Voids 5 2


 


# Bowel Movements 4 3








Laboratory Tests


4/21/20 17:00: CA 15-3 Antigen 13.4


4/22/20 05:10: 


White Blood Count 6.6, Red Blood Count 3.92L, Hemoglobin 12.5, Hematocrit 34.8L

, Mean Corpuscular Volume 89, Mean Corpuscular Hemoglobin 32.0H, Mean 

Corpuscular Hemoglobin Concent 36.0, Red Cell Distribution Width 13.4, Platelet 

Count 250, Mean Platelet Volume 6.9, Neutrophils (%) (Auto) , Lymphocytes (%) (

Auto) , Monocytes (%) (Auto) , Eosinophils (%) (Auto) , Basophils (%) (Auto) , 

Sodium Level 137, Potassium Level 4.3, Chloride Level 102, Carbon Dioxide Level 

27, Anion Gap 8, Blood Urea Nitrogen 22H, Creatinine 0.5L, Estimat Glomerular 

Filtration Rate > 60, Glucose Level 188H, Calcium Level 8.8, Total Bilirubin 0.3

, Aspartate Amino Transf (AST/SGOT) 50H, Alanine Aminotransferase (ALT/SGPT) 75

, Alkaline Phosphatase 139H, Total Protein 6.4, Albumin 2.1L, Globulin 4.3, 

Albumin/Globulin Ratio 0.5L, Carcinoembryonic Antigen [Pending], CA 19-9 

Antigen [Pending]


Height (Feet):  5


Height (Inches):  4.00


Weight (Pounds):  131


Objective


General: NAD, laying in bed comfortably, AAO x3


HEENT: NCAT, EOMi, mucous membranes moist


CV: RRR, no murmurs, rubs, or gallops


Pulm: CTAB, No wheezes, rhonchi, or rales, no accessory muscle usage


GI: Soft, nontender, nondistended, bowel sounds present


Neuro: CN II-XII grossly intact, sensation intact bilaterally, MS 3/5 in UE, 0/

5 LE B/L, able to wiggle toes


Ext: no LE edema











Anitha Ochoa M.D. Apr 22, 2020 08:45

## 2020-04-22 NOTE — DIAGNOSTIC IMAGING REPORT
Indication: Reason For Exam: WEAK

 

Technique: sagittal T1 fast spin echo, axial T1 and T2 FLAIR PROPELLER, axial T2 FS

PROPELLER, T2* GRE, axial diffusion weighted images, post contrast axial and coronal

T1 FLAIR PROPELLER images. ADC and exponential ADC maps generated

 

Comparison: For 14 2020 CT scan

 

Findings: . There is an area of acute diffusion in the posterior midbrain at and to

the left of midline. This demonstrates slightly increased signal on the T2-weighted

images. No other foci of restricted diffusion are demonstrated. Susceptibility

artifact is seen along the left side of the tentorium on the GRE images. There is

also susceptibility artifact along the falx. This appears to extent farther

anteriorly along the falx than is suggested on prior CT scan. There is also evidence

of a small subdural hematoma in the left frontal region, manifested by slightly

increased extra-axial FLAIR signal in this area and a slight degree of susceptibility

artifact on the GRE images. On prior CT scan, appearance was more suggestive of

prominent extra-axial CSF. This area measures approximately 3 mm thick and results in

1 to 2 mm of midline shift. 

 

There is pachymeningeal enhancement along the frontal and parietal convexities. There

is a equivocal slight focal widening of the left transverse sinus 

 

Normal size ventricles and extra axial CSF spaces. No evidence of intraparenchymal

hemorrhage.. Visualized orbits demonstrate evidence of prior bilateral cataract

surgery. There are bilateral mastoid effusions. There is minimal ethmoid sinus

mucosal thickening. The vascular flow voids are preserved.

 

Impression: Positive for acute lacunar infarct within the midbrain

 

Small subacute left frontal subdural hematoma measuring approximately 3 mm thick.

Asymmetric widening of the extra-axial space is evident on prior CT scans but

appearance on those studies much more suggestive of prominent extra-axial CSF

 

Acute/subacute small peritentorial parafalcine subdural hematoma, also described on

recent CT scans, is also noted

 

Bilateral convexity pachymeningeal enhancement. Most common etiology of this is

intracranial hypotension, and the presence of subdural hematomas as well as equivocal

slight widening of the left transverse sinus also supports this diagnosis. However,

differential considerations include pachymeningitis secondary to infection as well as

pachymeningeal metastatic tumor spread

 

Bilateral mastoid disease. Minimal ethmoid sinus disease

 

Critical value findings discussed by phone with Dr. Ochoa at the time of

interpretation

## 2020-04-22 NOTE — NUR
RD ASSESSMENT & RECOMMENDATIONS

SEE CARE ACTIVITY FOR COMPLETE ASSESSMENT



DAILY ESTIMATED NEEDS:

Needs based on DM + wound/ 61.5kg  

25-30  kcals/kg 

8310-5637  total kcals

1.25-1.5  g protein/kg

77-92  g total protein 

25-30  mL/kg

0884-2524  total fluid mLs



NUTRITION DIAGNOSIS:

* Increased kcal/prot intake needs R/T wound healing as evidenced by pt

now w/ rapid onset DTI @ sacrum-> now partially opened, and nonblanching

erythema @ BL heels.

* Altered nutrition related lab values r/t DKA, clinical status as

evidenced by A1C 10.4,  on adm-> now improved, (+) acetone, now w/

critically elev Na (161*->wnl) and low K(2.4* -> wnl).







[facility]      





 



PO DIET RECOMMENDATIONS:

[facility] LOW diet/ texture per SLP    



 





ADDITIONAL RECOMMENDATIONS:

1) Continue Glucerna 1 tetra BID w/ fair intake 

     1:1 feeding, pt needs assistance @ all meals 

2) Wound care: Continue Vit C/ Kenny 1pkt BID added to tray 

                      add MVIx1 + ZnSO4 220mg QD x 10 days 

3) Obtain a calibrated bed scale wts 

     -> now w/ added p200 mattress, stated wt= 54kg (119lbs) 

.

## 2020-04-22 NOTE — NUR
NURSE NOTES:

Wound care done on bilateral feet according to wound care order. Patient tolerated well.

-------------------------------------------------------------------------------

Addendum: 04/22/20 at 0409 by Danielle Martinez RN

-------------------------------------------------------------------------------

Wrong patient, DISREGARD

## 2020-04-22 NOTE — GENERAL PROGRESS NOTE
Assessment/Plan


Problem List:  


(1) C. difficile colitis


ICD Codes:  A04.72 - Enterocolitis due to Clostridium difficile, not specified 

as recurrent


SNOMED:  291909803


(2) Elevated CEA


ICD Codes:  R97.0 - Elevated carcinoembryonic antigen [CEA]


SNOMED:  721526833


(3) Subdural hematoma


ICD Codes:  S06.5X9A - Traumatic subdural hemorrhage with loss of consciousness 

of unspecified duration, initial encounter


SNOMED:  007764823


(4) UTI (urinary tract infection)


ICD Codes:  N39.0 - Urinary tract infection, site not specified


SNOMED:  48834467


(5) DKA (diabetic ketoacidoses)


ICD Codes:  E11.10 - Type 2 diabetes mellitus with ketoacidosis without coma


SNOMED:  926901207, 08295231


(6) Atrial fibrillation with rapid ventricular response


ICD Codes:  I48.91 - Unspecified atrial fibrillation


SNOMED:  199901149858869


(7) STEMI (ST elevation myocardial infarction)


ICD Codes:  I21.3 - ST elevation (STEMI) myocardial infarction of unspecified 

site


SNOMED:  183167881


(8) Malnutrition


ICD Codes:  E46 - Unspecified protein-calorie malnutrition


SNOMED:  06025287


Assessment/Plan:


on treatment for C.diff


? thoracic bone mets


elevated CEA


wt loss


anemia





s/p EGD


colonoscopy when C.diff treatment completed


most likely as out patient





Subjective


ROS Limited/Unobtainable:  No


Allergies:  


Coded Allergies:  


     No Known Allergies (Unverified , 4/15/15)





Objective





Last 24 Hour Vital Signs








  Date Time  Temp Pulse Resp B/P (MAP) Pulse Ox O2 Delivery O2 Flow Rate FiO2


 


4/22/20 09:00      Room Air  


 


4/22/20 08:00 97.3 87 17 121/70 (87) 98   


 


4/22/20 04:18 98.1 67 18 118/66 (83) 98   


 


4/22/20 00:00 97.3 71 18 121/65 (83) 98   


 


4/21/20 22:19 96.8       


 


4/21/20 22:18      Room Air  


 


4/21/20 20:31  72  132/75    


 


4/21/20 20:00 98.1 71 19 130/73 (92) 98   


 


4/21/20 16:00 96.8 72 18 132/75 (94) 98   


 


4/21/20 12:35 97.9 67 15 126/74 100 Nasal Cannula 3 


 


4/21/20 12:25  66 19 130/74 100 Nasal Cannula 3 


 


4/21/20 12:15  69 15 129/72 100 Nasal Cannula 3 


 


4/21/20 12:11  64 18  100   


 


4/21/20 12:07  65 17 111/59 100 Nasal Cannula 3 


 


4/21/20 12:02  65 16 127/72 100 Nasal Cannula 3 


 


4/21/20 11:57 97.6 64 18 102/64 100 Nasal Cannula 3 


 


4/21/20 11:54  64 18  100   

















Intake and Output  


 


 4/21/20 4/22/20





 19:00 07:00


 


Intake Total 630 ml 360 ml


 


Balance 630 ml 360 ml


 


  


 


Intake Oral 480 ml 


 


IV Total 150 ml 


 


Other  360 ml


 


# Voids 5 2


 


# Bowel Movements 4 3








Laboratory Tests


4/21/20 17:00: CA 15-3 Antigen 13.4


4/22/20 05:10: 


White Blood Count 6.6, Red Blood Count 3.92L, Hemoglobin 12.5, Hematocrit 34.8L

, Mean Corpuscular Volume 89, Mean Corpuscular Hemoglobin 32.0H, Mean 

Corpuscular Hemoglobin Concent 36.0, Red Cell Distribution Width 13.4, Platelet 

Count 250, Mean Platelet Volume 6.9, Neutrophils (%) (Auto) , Lymphocytes (%) (

Auto) , Monocytes (%) (Auto) , Eosinophils (%) (Auto) , Basophils (%) (Auto) , 

Sodium Level 137, Potassium Level 4.3, Chloride Level 102, Carbon Dioxide Level 

27, Anion Gap 8, Blood Urea Nitrogen 22H, Creatinine 0.5L, Estimat Glomerular 

Filtration Rate > 60, Glucose Level 188H, Calcium Level 8.8, Total Bilirubin 0.3

, Aspartate Amino Transf (AST/SGOT) 50H, Alanine Aminotransferase (ALT/SGPT) 75

, Alkaline Phosphatase 139H, Total Protein 6.4, Albumin 2.1L, Globulin 4.3, 

Albumin/Globulin Ratio 0.5L, Carcinoembryonic Antigen [Pending], CA 19-9 

Antigen [Pending]


Height (Feet):  5


Height (Inches):  4.00


Weight (Pounds):  131


General Appearance:  alert


EENT:  normal ENT inspection


Neck:  supple


Cardiovascular:  normal rate


Respiratory/Chest:  decreased breath sounds


Abdomen:  normal bowel sounds, non tender, soft


Extremities:  non-tender











Macario Gibson MD Apr 22, 2020 11:11

## 2020-04-22 NOTE — NUR
NURSE NOTES:

Received patient on bed, awake. IV sites intact and patent. Flushed and capped. Bed in low 
and locked position, call light in reach. No signs of respiratory distress or pain. Will 
continue to monitor.

## 2020-04-22 NOTE — NUR
*-* INSURANCE *-*



UPDATED CLINICALS AND REVIEWS HAVE BEEN FAXED TO:



[facility]: ROBERT WAITE

T: [phone] (LEAVE A MESSAGE)

F: [phone]

## 2020-04-22 NOTE — DIAGNOSTIC IMAGING REPORT
Indication: Abdominal pain, abnormal thoracic spine MRI

 

Technique: IV administration 26.5 mCi 99 M technetium MDP. Whole body and spot images

were obtained.

 

Comparison: MRI of the thoracic, lumbar, and cervical spine dated 4/15 and 4/16/2020,

chest abdomen pelvis CT dated 4/17/2020

 

Findings: Bladder is distended and filled with tracer. This could obscure pathology.

There is suggestion of subtle slight increased activity in the right mastoid region

minimally increased activity in the right ankle is probably on the basis of

degenerative change. No other abnormal foci of increased or decreased activity are

demonstrated. In particular, spinal activity appears normal. There is normal renal

activity

 

Impression: No generalized abnormal skeletal uptake or spinal uptake to correlate

with findings reported on recent MRI. This makes osseous metastatic disease unlikely.

Findings on MRI could just represent unusually prominent marrow conversion; myeloma

remains in the differential as it can demonstrate normal bone scan findings. 

 

Subtle increased right mastoid activity, probably related to evidence of mastoiditis

demonstrated on recent MRI

 

Distended bladder

 

Mildly increased right ankle uptake, probably due to degenerative changes.

## 2020-04-22 NOTE — NUR
CASE MANAGEMENT:REVIEW



SI;SEPSIS D/T E COLI BACTEREMIA AND UTI. BACTEREMIA. 

C-DIFF.  BONE MARROW SIGNAL ABNORMALITY. 

98.1   94   18   121/70   98% ON RA

BUN 22  CR 0.5     AST 50    ALK PHOS 139  ALB 2.1



IS;VANCOMYCIN JIV Q12 HRS

CEFEPIME IV Q12 HRS

DECADRON IV Q6 HRS

INSULIN NOVOLOG

INSULIN LEVEMIR

K-DUR PO BID



*****MED SURG STATUS*****



DCP;PATIENT IS FROM HOME



PLAN;THORACIC SPINE BIOPSY

DISCHARGE TO [facility] WHEN CLEARED

## 2020-04-23 VITALS — DIASTOLIC BLOOD PRESSURE: 65 MMHG | SYSTOLIC BLOOD PRESSURE: 118 MMHG

## 2020-04-23 VITALS — SYSTOLIC BLOOD PRESSURE: 120 MMHG | DIASTOLIC BLOOD PRESSURE: 66 MMHG

## 2020-04-23 VITALS — SYSTOLIC BLOOD PRESSURE: 119 MMHG | DIASTOLIC BLOOD PRESSURE: 71 MMHG

## 2020-04-23 LAB
ADD MANUAL DIFF: NO
ANION GAP SERPL CALC-SCNC: 8 MMOL/L (ref 5–15)
BASOPHILS NFR BLD AUTO: 0.3 % (ref 0–2)
BUN SERPL-MCNC: 20 MG/DL (ref 7–18)
CALCIUM SERPL-MCNC: 8.4 MG/DL (ref 8.5–10.1)
CHLORIDE SERPL-SCNC: 105 MMOL/L (ref 98–107)
CO2 SERPL-SCNC: 27 MMOL/L (ref 21–32)
CREAT SERPL-MCNC: 0.5 MG/DL (ref 0.55–1.3)
EOSINOPHIL NFR BLD AUTO: 0 % (ref 0–3)
ERYTHROCYTE [DISTWIDTH] IN BLOOD BY AUTOMATED COUNT: 13.8 % (ref 11.6–14.8)
HCT VFR BLD CALC: 34.6 % (ref 37–47)
HGB BLD-MCNC: 12.2 G/DL (ref 12–16)
LYMPHOCYTES NFR BLD AUTO: 14.5 % (ref 20–45)
MCV RBC AUTO: 89 FL (ref 80–99)
MONOCYTES NFR BLD AUTO: 2.8 % (ref 1–10)
NEUTROPHILS NFR BLD AUTO: 82.4 % (ref 45–75)
PLATELET # BLD: 239 K/UL (ref 150–450)
POTASSIUM SERPL-SCNC: 4.4 MMOL/L (ref 3.5–5.1)
RBC # BLD AUTO: 3.88 M/UL (ref 4.2–5.4)
SODIUM SERPL-SCNC: 140 MMOL/L (ref 136–145)
WBC # BLD AUTO: 6 K/UL (ref 4.8–10.8)

## 2020-04-23 RX ADMIN — Medication SCH TAB: at 09:03

## 2020-04-23 RX ADMIN — INSULIN ASPART SCH UNITS: 100 INJECTION, SOLUTION INTRAVENOUS; SUBCUTANEOUS at 11:50

## 2020-04-23 RX ADMIN — VANCOMYCIN HYDROCHLORIDE SCH MG: 500 INJECTION, POWDER, LYOPHILIZED, FOR SOLUTION INTRAVENOUS at 09:03

## 2020-04-23 RX ADMIN — INSULIN ASPART SCH UNITS: 100 INJECTION, SOLUTION INTRAVENOUS; SUBCUTANEOUS at 11:30

## 2020-04-23 RX ADMIN — Medication SCH TAB: at 13:35

## 2020-04-23 RX ADMIN — SODIUM CHLORIDE SCH MLS/HR: 9 INJECTION, SOLUTION INTRAVENOUS at 05:54

## 2020-04-23 RX ADMIN — INSULIN ASPART SCH UNITS: 100 INJECTION, SOLUTION INTRAVENOUS; SUBCUTANEOUS at 07:01

## 2020-04-23 RX ADMIN — DEXAMETHASONE SODIUM PHOSPHATE SCH MG: 4 INJECTION, SOLUTION INTRA-ARTICULAR; INTRALESIONAL; INTRAMUSCULAR; INTRAVENOUS; SOFT TISSUE at 00:16

## 2020-04-23 RX ADMIN — INSULIN ASPART SCH UNITS: 100 INJECTION, SOLUTION INTRAVENOUS; SUBCUTANEOUS at 06:30

## 2020-04-23 RX ADMIN — DEXAMETHASONE SODIUM PHOSPHATE SCH MG: 4 INJECTION, SOLUTION INTRA-ARTICULAR; INTRALESIONAL; INTRAMUSCULAR; INTRAVENOUS; SOFT TISSUE at 05:14

## 2020-04-23 RX ADMIN — DEXAMETHASONE SODIUM PHOSPHATE SCH MG: 4 INJECTION, SOLUTION INTRA-ARTICULAR; INTRALESIONAL; INTRAMUSCULAR; INTRAVENOUS; SOFT TISSUE at 12:01

## 2020-04-23 RX ADMIN — Medication SCH MG: at 09:03

## 2020-04-23 RX ADMIN — VANCOMYCIN HYDROCHLORIDE SCH MG: 500 INJECTION, POWDER, LYOPHILIZED, FOR SOLUTION INTRAVENOUS at 13:35

## 2020-04-23 NOTE — NUR
*-* INSURANCE *-*



UPDATED CLINICALS AND REVIEWS HAVE BEEN FAXED TO:



[facility]: ROBERT WAITE

T: [phone] (LEAVE A MESSAGE)

F: [phone]

## 2020-04-23 NOTE — NUR
NURSE NOTES:

Patient discharged with [facility]. VSS. Patient stable. Belongings list verified, 
ID band removed, IV sites removed.

## 2020-04-23 NOTE — NUR
DISCHARGE PLAN



PER [facility] AT [facility], PATIENT HAS BEEN ACCEPTED AND WILL ADMIT TO ROOM 501 UNDER 
CARE OF 

DR. BREN [facility] TRANSPORTATION SCHEDULED WITH [facility] AT EXT 1801 WITH ETA  @ [phone]

## 2020-04-23 NOTE — GENERAL PROGRESS NOTE
Assessment/Plan


Problem List:  


(1) Abnormal thyroid blood test


ICD Codes:  R79.89 - Other specified abnormal findings of blood chemistry


SNOMED:  525606568, 578882842585453


(2) Diabetes mellitus out of control


ICD Codes:  E11.65 - Type 2 diabetes mellitus with hyperglycemia


SNOMED:  83344044, 320626248


(3) DKA (diabetic ketoacidoses)


ICD Codes:  E11.10 - Type 2 diabetes mellitus with ketoacidosis without coma


SNOMED:  728520197, 71854183


(4) STEMI (ST elevation myocardial infarction)


ICD Codes:  I21.3 - ST elevation (STEMI) myocardial infarction of unspecified 

site


SNOMED:  118110205


(5) Prolonged Q-T interval on ECG


ICD Codes:  R94.31 - Abnormal electrocardiogram [ECG] [EKG]


SNOMED:  715222850


(6) Atrial fibrillation with rapid ventricular response


ICD Codes:  I48.91 - Unspecified atrial fibrillation


SNOMED:  075692515676929


(7) Hypernatremia


ICD Codes:  E87.0 - Hyperosmolality and hypernatremia


SNOMED:  842136720, 06116625


Assessment/Plan:


continue Levemir 30 units qhs 


reduced Novolog to 10 units ac tid 


continue NISS ac / hs 





TSH still suppressed, free T4 normalized 


continue to hold thyroxine





Subjective


Allergies:  


Coded Allergies:  


     No Known Allergies (Unverified , 4/15/15)


Subjective


events noted 


interval notes reviewed 











Item Value  Date Time


 


Bedside Blood Glucose 78 mg/dl 4/23/20 1150


 


Bedside Blood Glucose 138 mg/dl H 4/23/20 0701


 


Bedside Blood Glucose 138 mg/dl H 4/23/20 0630


 


Bedside Blood Glucose 151 mg/dl H 4/22/20 2206


 


Bedside Blood Glucose 97 mg/dl 4/22/20 1150











Objective





Last 24 Hour Vital Signs








  Date Time  Temp Pulse Resp B/P (MAP) Pulse Ox O2 Delivery O2 Flow Rate FiO2


 


4/23/20 09:03  72  120/66    


 


4/23/20 09:00      Room Air  


 


4/23/20 08:00 98.1 72 20 120/66 (84) 97   


 


4/23/20 04:00 98.3 68 18 119/71 (87) 96   


 


4/22/20 23:58 97.9 73 19 121/67 (85) 98   


 


4/22/20 22:16  77  113/62    


 


4/22/20 21:00      Room Air  


 


4/22/20 20:00 98.1 77 19 113/62 (79) 97   


 


4/22/20 16:00 98.0 90 17 119/79 (92) 98   

















Intake and Output  


 


 4/22/20 4/23/20





 19:00 07:00


 


Intake Total 855 ml 


 


Balance 855 ml 


 


  


 


Intake Oral 800 ml 


 


IV Total 55 ml 


 


# Voids 3 2


 


# Bowel Movements 3 2








Laboratory Tests


4/23/20 05:23: 


White Blood Count 6.0, Red Blood Count 3.88L, Hemoglobin 12.2, Hematocrit 34.6L

, Mean Corpuscular Volume 89, Mean Corpuscular Hemoglobin 31.4H, Mean 

Corpuscular Hemoglobin Concent 35.1, Red Cell Distribution Width 13.8, Platelet 

Count 239, Mean Platelet Volume 6.3L, Neutrophils (%) (Auto) 82.4H, Lymphocytes 

(%) (Auto) 14.5L, Monocytes (%) (Auto) 2.8, Eosinophils (%) (Auto) 0.0, 

Basophils (%) (Auto) 0.3, Sodium Level 140, Potassium Level 4.4, Chloride Level 

105, Carbon Dioxide Level 27, Anion Gap 8, Blood Urea Nitrogen 20H, Creatinine 

0.5L, Estimat Glomerular Filtration Rate > 60, Glucose Level 169H, Calcium 

Level 8.4L, Vancomycin Level Trough 16.1H


Height (Feet):  5


Height (Inches):  4.00


Weight (Pounds):  131


General Appearance:  no apparent distress


Neck:  normal alignment


Cardiovascular:  normal rate


Respiratory/Chest:  lungs clear


Abdomen:  normal bowel sounds


Objective





Current Medications








 Medications


  (Trade)  Dose


 Ordered  Sig/Meghan


 Route


 PRN Reason  Start Time


 Stop Time Status Last Admin


Dose Admin


 


 Acetaminophen


  (Tylenol)  650 mg  Q6H  PRN


 ORAL


 Mild Pain (Pain Scale 1-3)  4/17/20 10:00


 5/17/20 09:59   


 


 


 Ascorbic Acid


  (Vitamin C)  250 mg  TWICE A  DAY


 ORAL


   4/16/20 18:00


 5/15/20 17:59  4/23/20 09:03


 


 


 Atorvastatin


 Calcium


  (Lipitor)  10 mg  BEDTIME


 ORAL


   4/16/20 21:00


 7/10/20 20:59  4/22/20 21:59


 


 


 Cefepime HCl 2 gm/


 Dextrose  100 ml @ 


 200 mls/hr  Q8HR


 IVPB


   4/22/20 22:00


 4/29/20 21:59  4/23/20 05:09


 


 


 Dexamethasone


 Sodium Phosphate


  (Decadron 4mg/ml


 vial)  4 mg  Q6HR


 IVP


   4/17/20 00:00


 7/16/20 00:00  4/23/20 12:01


 


 


 Dextrose


  (Dextrose 50%)  25 ml  Q30M  PRN


 IV


 Hypoglycemia  4/16/20 16:45


 7/9/20 21:44   


 


 


 Dextrose


  (Dextrose 50%)  50 ml  Q30M  PRN


 IV


 Hypoglycemia  4/16/20 16:45


 7/9/20 21:44   


 


 


 Gadobutrol


  (Gadavist)  7.5 mmol  NOW  PRN


 IV


 Radiology Procedure  4/22/20 08:45


 4/26/20 08:38   


 


 


 Insulin Aspart


  (NovoLOG)    AC+HS


 SUBQ


   4/16/20 21:00


 7/10/20 09:29  4/22/20 22:06


 


 


 Insulin Aspart


  (NovoLOG)  16 units  NOVOTIAC


 SUBQ


   4/21/20 16:50


 7/11/20 11:49  4/23/20 07:01


 


 


 Insulin Detemir


  (Levemir)  30 units  QHS


 SUBQ


   4/16/20 21:00


 7/11/20 20:59  4/22/20 22:06


 


 


 Lactobacillus


 Acidophilus


  (Culturelle)  1 tab  THREE TIMES A  DAY


 ORAL


   4/19/20 13:00


 7/18/20 12:59  4/23/20 09:03


 


 


 Levetiracetam


  (Keppra)  250 mg  Q12HR


 ORAL


   4/16/20 21:00


 5/29/20 20:59  4/23/20 09:03


 


 


 Lidocaine HCl


  (Xylocaine 1%


 30ml)  30 ml  ONCE  PRN


 INJ


 RADIOLOGY  4/22/20 13:27


 4/23/20 23:59   


 


 


 Lorazepam


  (Ativan)  1 mg  Q6H  PRN


 ORAL


 For Anxiety  4/17/20 08:30


 4/24/20 08:29   


 


 


 Metoprolol


 Tartrate


  (Lopressor)  25 mg  Q12HR


 ORAL


   4/16/20 21:00


 7/10/20 20:59  4/23/20 09:03


 


 


 Potassium Chloride


  (K-Dur)  40 meq  BID


 ORAL


   4/16/20 18:00


 7/12/20 09:14  4/23/20 09:03


 


 


 Sodium Chloride  1,000 ml @ 


 75 mls/hr  [id number]


 IV


   4/23/20 00:00


 5/23/20 00:00  4/23/20 00:07


 


 


 Tramadol HCl


  (Ultram)  25 mg  Q6H  PRN


 ORAL


 Severe Breakthru Pain (>7)  4/16/20 16:48


 4/23/20 16:47  4/22/20 19:55


 


 


 Vancomycin HCl


  (Firvanq)  125 mg  FOUR TIMES A  DAY


 ORAL


   4/16/20 18:00


 4/26/20 17:59  4/23/20 09:03


 


 


 Vancomycin HCl


  (Vanco rx to


 dose)  1 ea  DAILY  PRN


 MISC


 Per rx protocol  4/21/20 18:15


 5/21/20 18:14   


 


 


 Vancomycin HCl


 750 mg/Sodium


 Chloride  275 ml @ 


 183.333


 mls/hr  Q12HR@0600,1800


 IVPB


   4/22/20 06:00


 4/27/20 05:59  4/23/20 05:54


 

















Kevin Mohr MD Apr 23, 2020 13:23

## 2020-04-23 NOTE — NEUROLOGY PROGRESS NOTE
Interim History


Interim History


ROS Limited/Unobtainable:  Yes


Interim History


ao x 3, following, still paraparetic


no evidence of multiple myeloma


to CRI





Objective


Physical Exam





Last Vital Signs








  Date Time  Temp Pulse Resp B/P (MAP) Pulse Ox O2 Delivery O2 Flow Rate FiO2


 


4/23/20 12:00 97.2 74 20 118/65 (82) 95   


 


4/23/20 09:00      Room Air  


 


4/21/20 12:35       3 











Laboratory Tests








Test


  4/23/20


05:23


 


White Blood Count


  6.0 K/UL


(4.8-10.8)


 


Red Blood Count


  3.88 M/UL


(4.20-5.40)  L


 


Hemoglobin


  12.2 G/DL


(12.0-16.0)


 


Hematocrit


  34.6 %


(37.0-47.0)  L


 


Mean Corpuscular Volume 89 FL (80-99)  


 


Mean Corpuscular Hemoglobin


  31.4 PG


(27.0-31.0)  H


 


Mean Corpuscular Hemoglobin


Concent 35.1 G/DL


(32.0-36.0)


 


Red Cell Distribution Width


  13.8 %


(11.6-14.8)


 


Platelet Count


  239 K/UL


(150-450)


 


Mean Platelet Volume


  6.3 FL


(6.5-10.1)  L


 


Neutrophils (%) (Auto)


  82.4 %


(45.0-75.0)  H


 


Lymphocytes (%) (Auto)


  14.5 %


(20.0-45.0)  L


 


Monocytes (%) (Auto)


  2.8 %


(1.0-10.0)


 


Eosinophils (%) (Auto)


  0.0 %


(0.0-3.0)


 


Basophils (%) (Auto)


  0.3 %


(0.0-2.0)


 


Sodium Level


  140 MMOL/L


(136-145)


 


Potassium Level


  4.4 MMOL/L


(3.5-5.1)


 


Chloride Level


  105 MMOL/L


()


 


Carbon Dioxide Level


  27 MMOL/L


(21-32)


 


Anion Gap


  8 mmol/L


(5-15)


 


Blood Urea Nitrogen


  20 mg/dL


(7-18)  H


 


Creatinine


  0.5 MG/DL


(0.55-1.30)  L


 


Estimat Glomerular Filtration


Rate > 60 mL/min


(>60)


 


Glucose Level


  169 MG/DL


()  H


 


Calcium Level


  8.4 MG/DL


(8.5-10.1)  L


 


Vancomycin Level Trough


  16.1 ug/mL


(5.0-12.0)  H


 


Immunoglobulin G Pending  


 


Immunoglobulin M Pending  








Head:  normocophalic


Neck:  no rigidity


EENT:  benign





Neurologic Exam


Mental Status:  awake


Speech:  normal speech


Language:  normal language


Cranial Nerve VII:  no facial asymmetry


Objective


alert, oriented x 2, conversant


UEs 5/5


LEs 1/5 flaccid 


cc 35 min





Impression/Recommendations


Problems:  


(1) STEMI (ST elevation myocardial infarction)


(2) Atrial fibrillation with rapid ventricular response


(3) Prolonged Q-T interval on ECG


(4) DKA (diabetic ketoacidoses)


(5) Hypernatremia


(6) UTI (urinary tract infection)


(7) Subdural hematoma


Diagnostic Impression





Given LE weakness , suspect myelopathy, mri cervical thoracic and lumbar ordered

- called radiology to expedite, imaging still not in system to review





 keppra to 250 mg bid


SBP < 150


PT OT


consider Osmin Chao MD Apr 23, 2020 18:37

## 2020-04-23 NOTE — GENERAL PROGRESS NOTE
Assessment/Plan


Problem List:  


(1) C. difficile colitis


ICD Codes:  A04.72 - Enterocolitis due to Clostridium difficile, not specified 

as recurrent


SNOMED:  242357136


(2) Elevated CEA


ICD Codes:  R97.0 - Elevated carcinoembryonic antigen [CEA]


SNOMED:  372346792


(3) Subdural hematoma


ICD Codes:  S06.5X9A - Traumatic subdural hemorrhage with loss of consciousness 

of unspecified duration, initial encounter


SNOMED:  628239813


(4) UTI (urinary tract infection)


ICD Codes:  N39.0 - Urinary tract infection, site not specified


SNOMED:  90734013


(5) DKA (diabetic ketoacidoses)


ICD Codes:  E11.10 - Type 2 diabetes mellitus with ketoacidosis without coma


SNOMED:  069671940, 90425513


(6) Atrial fibrillation with rapid ventricular response


ICD Codes:  I48.91 - Unspecified atrial fibrillation


SNOMED:  682334214764765


(7) STEMI (ST elevation myocardial infarction)


ICD Codes:  I21.3 - ST elevation (STEMI) myocardial infarction of unspecified 

site


SNOMED:  927049979


(8) Malnutrition


ICD Codes:  E46 - Unspecified protein-calorie malnutrition


SNOMED:  84624627


Assessment/Plan:


on treatment for C.diff


? thoracic bone mets


elevated CEA


wt loss


anemia





s/p EGD


oncology in put appreciated


pending breast tumor markers and bone scan


will plan for possible colonoscopy if above neg





Subjective


ROS Limited/Unobtainable:  Yes


Allergies:  


Coded Allergies:  


     No Known Allergies (Unverified , 4/15/15)





Objective





Last 24 Hour Vital Signs








  Date Time  Temp Pulse Resp B/P (MAP) Pulse Ox O2 Delivery O2 Flow Rate FiO2


 


4/23/20 09:03  72  120/66    


 


4/23/20 09:00      Room Air  


 


4/23/20 08:00 98.1 72 20 120/66 (84) 97   


 


4/23/20 04:00 98.3 68 18 119/71 (87) 96   


 


4/22/20 23:58 97.9 73 19 121/67 (85) 98   


 


4/22/20 22:16  77  113/62    


 


4/22/20 21:00      Room Air  


 


4/22/20 20:00 98.1 77 19 113/62 (79) 97   


 


4/22/20 16:00 98.0 90 17 119/79 (92) 98   


 


4/22/20 12:00 97.6 94 18 120/76 (91) 99   

















Intake and Output  


 


 4/22/20 4/23/20





 19:00 07:00


 


Intake Total 855 ml 


 


Balance 855 ml 


 


  


 


Intake Oral 800 ml 


 


IV Total 55 ml 


 


# Voids 3 2


 


# Bowel Movements 3 2








Laboratory Tests


4/23/20 05:23: 


White Blood Count 6.0, Red Blood Count 3.88L, Hemoglobin 12.2, Hematocrit 34.6L

, Mean Corpuscular Volume 89, Mean Corpuscular Hemoglobin 31.4H, Mean 

Corpuscular Hemoglobin Concent 35.1, Red Cell Distribution Width 13.8, Platelet 

Count 239, Mean Platelet Volume 6.3L, Neutrophils (%) (Auto) 82.4H, Lymphocytes 

(%) (Auto) 14.5L, Monocytes (%) (Auto) 2.8, Eosinophils (%) (Auto) 0.0, 

Basophils (%) (Auto) 0.3, Sodium Level 140, Potassium Level 4.4, Chloride Level 

105, Carbon Dioxide Level 27, Anion Gap 8, Blood Urea Nitrogen 20H, Creatinine 

0.5L, Estimat Glomerular Filtration Rate > 60, Glucose Level 169H, Calcium 

Level 8.4L, Vancomycin Level Trough 16.1H


Height (Feet):  5


Height (Inches):  4.00


Weight (Pounds):  131


General Appearance:  alert


EENT:  normal ENT inspection


Neck:  supple


Cardiovascular:  normal rate


Respiratory/Chest:  decreased breath sounds


Abdomen:  normal bowel sounds, non tender, soft


Extremities:  non-tender











Macario Gibson MD Apr 23, 2020 11:32

## 2020-04-23 NOTE — DISCHARGE SUMMARY
Discharge Summary


Hospital Course


Date of Admission


Apr 10, 2020 at 19:55


Date of Discharge





Admitting Diagnosis


DIABETIC KETOACIDOSIS


TOÑO Lin is a 65 year old female who was admitted on Apr 10, 2020 at 19:

55 for Diabetic Ketoacidosis


Hospital Course


65-year-old female with PMH of medical noncompliance, DM type II who presents 

with b/l weakness, N/V, GLF, on admission pt found to be in DKA with 's.  

Patient admitted to [facility] with DKA protocol initiated, endocrine (Dr. Mohr) 

consulted, DKA improved and patient was transitioned to insulin. HgbA1C 10.4, 

Pt to take detemir 30 qHS, Novolog 10 U qAC. Patient was noted to have E. coli 

UTI, E. coli bacteremia and C. difficile colitis which was treated by 

antibiotics per ID. Patient noted right-sided weakness as well as bilateral 

lower extremity weakness. No loss of sensation, bowel/urinary incontinence. Pt 

stated she fell and hit her head 3 days prior to arrival, did not seek medical 

attention at that time and stated she noted generalized weakness that was 

ongoing for months prior to fall. Patient also noted loose stools and weight 

loss prior to admission for several months. CT head was obtained which revealed 

subdural hematoma, 3 mm. Neurology, Dr. Cheney, was consulted. Repeat CT head 

showed stable subdural hematoma, no transfer or neurosurgical intervention at 

this time per neurology. MRI of C-spine, thorax, and lumbar were obtained.  MRI 

of C-spine/lumbar revealed spinal stenosis. MRI of thorax revealed multiple 

bone marrow enhancements, suspicious for malignancy vs bone marrow 

reconversion.  Hematology/oncology, Dr. Levin, was consulted.  Extensive 

work-up was done: BM biopsy normocellular, cytogenetics normal, flow cytometry 

normal, bone scan normal, currently no evidence of malignancy at this time. Dr. Gibson consulted, pt underwent EGD which was negative. Pt will need 

colonoscopy as o/p once c diff resolves. MRI brain revealed midbrain lacunar 

infarct.  Patient was started on statin, ASA held given SDH. D/w Neurology, 

cont. to hold ASA at this time. Pt to f/u w/GI for o/p colonoscopy, Neurology, 

Endocrine and PCP as o/p. Pt to cont. 10 more days of PO vanco for c diff and 3 

more days of CTX for e coli UTI. Pt in stable condition, d/c to [facility] for further rehabilitation.





#DKA - resolved


#Uncontrolled DM, Hgb A1C 10.4


#E. coli UTI


#E. coli bacteremia


#c. diff colitis


#Multilevel Thoracic BM enhancements


#Meningeal Enhancements on Brain MRI


#Midbrain Lacunar Infarct


#SDH 3mm no midline shift


#Right sided weakness and 


#Generalized B/L LE Weakness 2/2 above


#Elevated d-dimer


#Tachycardia - resolved


#Hypernatremia - resolved


#Hypokalemia - improved


#Hypophosphatemia


#Sacral Decubitus Ulcer





D/C planning >30 mins





Discharge Medications


New Medications:  


Ceftriaxone Sodium (Ceftriaxone) 1 Gm Vial


1 GM IJ natacha for 3 Days, VIAL





Vancomycin Hcl (Vancocin Hcl) 125 Mg Capsule


125 MG PO QID for 10 Days, #40 CAP





Atorvastatin Calcium* (Lipitor*) 10 Mg Tablet


10 MG ORAL BEDTIME for 30 Days, #30 TAB 3 Refills





Insulin Aspart (Novolog Flexpen) 100 Unit/1 Ml Insuln.pen


16 UNITS SUBQ NOVOTIAC for 30 Days, #30 EA 3 Refills





Insulin Detemir (Levemir Flexpen) 100 Unit/1 Ml Insuln.pen


30 UNITS SUBQ QHS for 30 Days, #30 EA 3 Refills





Metoprolol Tartrate (Metoprolol Tartrate) 25 Mg Tablet


25 MG ORAL Q12HR for 30 Days, #60 TAB 3 Refills





 


Discontinued Medications:  


No Known Medications* (NKM - No Known Medications*)  .


0 ., 0 Refills











Discharge


Condition Upon Discharge:  stable


Discharge Vital Signs





Last Vital Signs








  Date Time  Temp Pulse Resp B/P (MAP) Pulse Ox O2 Delivery O2 Flow Rate FiO2


 


4/23/20 09:03  72  120/66    


 


4/23/20 09:00      Room Air  


 


4/23/20 08:00 98.1  20  97   


 


4/21/20 12:35       3 








Discharge Disposition


Patient was discharged to





Discharge Instructions


Discharge Instructions


Follow up with:  PCP, GI for o/p colonoscopy. F/u w/Neurology as o/p.


Call MD/Return to Hospital if:  symptoms worsen.











Anitha Ochoa M.D. Apr 23, 2020 14:54

## 2020-04-23 NOTE — NUR
NURSE NOTES:

Received patient in bed. Awake, A/O x4. Yi and Upper sorbian speaking. IV in the Left hand and 
Right hand, IVF infusing as ordered. Patient denies pain at this time. On room air, 
respirations even and unlabored. Side rails up x2, bed low and locked, side rails padded. 
Call light within reach.

## 2020-04-23 NOTE — NUR
CASE MANAGEMENT:REVIEW



SI;SEPSIS D/T E COLI BACTEREMIA AND UTI.      BACTEREMIA. 

C-DIFF.  BONE MARROW SIGNAL ABNORMALITY. 

98.3   77   20   121/67   96% ON RA

BUN 20   CR 0.5      CA 8.4



IS;IVF NS @ 75 ML/HR

CEFEPIME IV Q8 HRS 

VANCOMYCIN IV BID

INSULIN NOVOLOG

INSULIN LEVEMIR

DECADRON IV Q6 HRS



*****MED SURG STATUS*****

DCP;[facility] UPON DISCHARGE

## 2020-04-23 NOTE — DISCHARGE INSTRUCTIONS
Discharge Instructions


Discharge Instructions


Follow up with:  PCP, GI for o/p colonoscopy. F/u w/Neurology as o/p.


Call MD/Return to Hospital if:  symptoms worsen.





For Congestive Heart Failure


Reminder


Report to your physician any weight gain of 5 pounds or more in one week.











Anitha Ochoa M.D. Apr 23, 2020 14:37

## 2020-04-23 NOTE — NEPHROLOGY PROGRESS NOTE
Assessment/Plan


Problem List:  


(1) Hypernatremia


Assessment:  Improving





(2) UTI (urinary tract infection)


(3) Subdural hematoma


(4) DKA (diabetic ketoacidoses)


(5) Electrolyte imbalance


(6) C. difficile colitis


Assessment





Hypernatremia most likely due to free water deficit


Hypokalemia, hypophosphatemia


Hyperglycemia and DKA


Evidence of UTI


Right-sided weakness with 3 mm subdural hematoma found in CT scan


Plan


Multilevel Thoracic BM enhancements


Meningeal Enhancements on Brain MRI


Patient has C. difficile colitis now


Lactobacillus added


Discontinue IV fluid


Discontinue Vincent catheter


Change Protonix to p.o.


Stop Neutra-Phos due to diarrhea





Potassium and phosphorus and magnesium supplement as needed


Diet started as the patient is more alert


Low dose of Lopressor


Monitor renal parameters and electrolytes


Neuro eval noted


Keep the blood pressure and blood sugar in check


Urine studies


Per orders





Subjective


ROS Limited/Unobtainable:  No





Objective


Objective





Last 24 Hour Vital Signs








  Date Time  Temp Pulse Resp B/P (MAP) Pulse Ox O2 Delivery O2 Flow Rate FiO2


 


4/23/20 09:03  72  120/66    


 


4/23/20 08:00 98.1 72 20 120/66 (84) 97   


 


4/23/20 04:00 98.3 68 18 119/71 (87) 96   


 


4/22/20 23:58 97.9 73 19 121/67 (85) 98   


 


4/22/20 22:16  77  113/62    


 


4/22/20 21:00      Room Air  


 


4/22/20 20:00 98.1 77 19 113/62 (79) 97   


 


4/22/20 16:00 98.0 90 17 119/79 (92) 98   


 


4/22/20 12:00 97.6 94 18 120/76 (91) 99   


 


4/22/20 11:17  87  121/70    

















Intake and Output  


 


 4/22/20 4/23/20





 19:00 07:00


 


Intake Total 855 ml 


 


Balance 855 ml 


 


  


 


Intake Oral 800 ml 


 


IV Total 55 ml 


 


# Voids 3 2


 


# Bowel Movements 3 2








Laboratory Tests


4/23/20 05:23: 


White Blood Count 6.0, Red Blood Count 3.88L, Hemoglobin 12.2, Hematocrit 34.6L

, Mean Corpuscular Volume 89, Mean Corpuscular Hemoglobin 31.4H, Mean 

Corpuscular Hemoglobin Concent 35.1, Red Cell Distribution Width 13.8, Platelet 

Count 239, Mean Platelet Volume 6.3L, Neutrophils (%) (Auto) 82.4H, Lymphocytes 

(%) (Auto) 14.5L, Monocytes (%) (Auto) 2.8, Eosinophils (%) (Auto) 0.0, 

Basophils (%) (Auto) 0.3, Sodium Level 140, Potassium Level 4.4, Chloride Level 

105, Carbon Dioxide Level 27, Anion Gap 8, Blood Urea Nitrogen 20H, Creatinine 

0.5L, Estimat Glomerular Filtration Rate > 60, Glucose Level 169H, Calcium 

Level 8.4L, Vancomycin Level Trough 16.1H


Height (Feet):  5


Height (Inches):  4.00


Weight (Pounds):  131


General Appearance:  no apparent distress


Objective


No change











Jonathan Jacobson MD Apr 23, 2020 10:18

## 2020-04-23 NOTE — HEMATOLOGY/ONC PROGRESS NOTE
Assessment/Plan


Assessment/Plan


Assessment and Recs:


# Multilevel bone marrow signal abnormality, as described. Differential 

considerations include  metastatic neoplasm, infiltrative neoplasm such as 

myeloma or lymphoma, exuberant hematopoietic hyperplasia, less likely systemic 

metabolic disorders. Note that this is much more striking in the thoracic spine 

than it is in the lumbar and cervical segments study earlier


--> tumor markers have been ordered


--> spep and upep


--> bone marrow biopsy was done which shows a increase in Nk population, may be 

reactive v clonal process (nonspecific)and normocellular


--> also ordered for ct c/a/p with iv contrast - reviewed, no primary tumor. 

Slightly more conspicuous, since prior exam of one week earlier, pleural-based 

nodules in the right costophrenic sulcus. Apparent interim growth over short 

perio suggests that this is inflammatory or an area of atelectasis. Unchanged 3 

mm left lower lobe subpleural nodule along the major fissure.


--> r/o hemolysis at this time, DIC


--> Myeloma essentially ruled out negative spep, have ordered igg, igm


--> dw pcp, consider neurosurg eval if needs further biopsy


# Thrombocytopenia likely initially due to infection


--> plt counts slowly uptreding


--> plt 82-->105-->160-->200


# DKA (diabetic ketoacidoses)


--> as per endo, iss and accuchecks qac and qhs


--> hgb a1c goal <8, now 10.4


--> maintains on dexamethasone


# Hypernatremia


--> as per renal recs


--> continue ivfs


# UTI (urinary tract infection)


--> per id, on abx: ceftriaxone 


# Ground-level fall.


--> pt/ot as needed


# Nausea and vomiting.


--> zofran prn


# Right sided weakness


# Generalized Weakness


# SDH 3mm no midline shift


# Elevated d-dimer


# Tachycardia - resolved





Appreciate consultation and dw Rn





Subjective


Constitutional:  Denies: no symptoms, chills, fever, malaise, weakness, other


HEENT:  Denies: no symptoms, eye pain, blurred vision, tearing, double vision, 

ear pain, ear discharge, nose pain, nose congestion, throat pain, throat 

swelling, mouth pain, mouth swelling, other


Cardiovascular:  Denies: no symptoms, chest pain, edema, irregular heart rate, 

lightheadedness, palpitations, syncope, other


Respiratory:  Denies: no symptoms, cough, shortness of breath, SOB with 

excertion, SOB at rest, sputum, wheezing, other


Genitourinary:  Denies: no symptoms, burning, discharge, frequency, flank pain, 

hematuria, incontinence, pain, urgency, other


Neurologic/Psychiatric:  Denies: no symptoms, anxiety, depressed, emotional 

problems, headache, numbness, paresthesia, pre-existing deficit, seizure, 

tingling, tremors, weakness, other


Endocrine:  Denies: no symptoms, excessive sweating, flushing, intolerance to 

cold, intolerance to heat, increased hunger, increased thirst, increased urine, 

unexplained weight gain, unexplained weight loss, other


Hematologic/Lymphatic:  Denies: no symptoms, anemia, easy bleeding, easy 

bruising, adenopathy, other


Allergies:  


Coded Allergies:  


     No Known Allergies (Unverified , 4/15/15)


Subjective


4/17 cxr and labs reviewed, on room air, bp stable, bone marrow biopsy ordered 


4/19 med surg, no overnight events, ct and labs reviewed, on abx 


4/20 labs are noted, no bleeding hgb 11, plt much better


4/21 labs slowly improving, remains on abx as per id, no bleeding


4/22 labs noted, still with lower ext weakness, dw neuro, pcp, may need 

neurosurg eval


4/23 labs noted, no bleeding, myeloma ruled out, dw pcp, dw neuro





Objective


Objective





Current Medications








 Medications


  (Trade)  Dose


 Ordered  Sig/Meghan


 Route


 PRN Reason  Start Time


 Stop Time Status Last Admin


Dose Admin


 


 Acetaminophen


  (Tylenol)  650 mg  Q6H  PRN


 ORAL


 Mild Pain (Pain Scale 1-3)  4/17/20 10:00


 5/17/20 09:59   


 


 


 Ascorbic Acid


  (Vitamin C)  250 mg  TWICE A  DAY


 ORAL


   4/16/20 18:00


 5/15/20 17:59  4/23/20 09:03


 


 


 Atorvastatin


 Calcium


  (Lipitor)  10 mg  BEDTIME


 ORAL


   4/16/20 21:00


 7/10/20 20:59  4/22/20 21:59


 


 


 Cefepime HCl 2 gm/


 Dextrose  100 ml @ 


 200 mls/hr  Q8HR


 IVPB


   4/22/20 22:00


 4/29/20 21:59  4/23/20 13:36


 


 


 Dexamethasone


 Sodium Phosphate


  (Decadron 4mg/ml


 vial)  4 mg  Q6HR


 IVP


   4/17/20 00:00


 7/16/20 00:00  4/23/20 12:01


 


 


 Dextrose


  (Dextrose 50%)  25 ml  Q30M  PRN


 IV


 Hypoglycemia  4/16/20 16:45


 7/9/20 21:44   


 


 


 Dextrose


  (Dextrose 50%)  50 ml  Q30M  PRN


 IV


 Hypoglycemia  4/16/20 16:45


 7/9/20 21:44   


 


 


 Gadobutrol


  (Gadavist)  7.5 mmol  NOW  PRN


 IV


 Radiology Procedure  4/22/20 08:45


 4/26/20 08:38   


 


 


 Insulin Aspart


  (NovoLOG)    AC+HS


 SUBQ


   4/16/20 21:00


 7/10/20 09:29  4/22/20 22:06


 


 


 Insulin Aspart


  (NovoLOG)  10 units  NOVOTIAC


 SUBQ


   4/23/20 16:50


 7/11/20 11:49   


 


 


 Insulin Detemir


  (Levemir)  30 units  QHS


 SUBQ


   4/16/20 21:00


 7/11/20 20:59  4/22/20 22:06


 


 


 Lactobacillus


 Acidophilus


  (Culturelle)  1 tab  THREE TIMES A  DAY


 ORAL


   4/19/20 13:00


 7/18/20 12:59  4/23/20 13:35


 


 


 Levetiracetam


  (Keppra)  250 mg  Q12HR


 ORAL


   4/16/20 21:00


 5/29/20 20:59  4/23/20 09:03


 


 


 Lorazepam


  (Ativan)  1 mg  Q6H  PRN


 ORAL


 For Anxiety  4/17/20 08:30


 4/24/20 08:29   


 


 


 Metoprolol


 Tartrate


  (Lopressor)  25 mg  Q12HR


 ORAL


   4/16/20 21:00


 7/10/20 20:59  4/23/20 09:03


 


 


 Potassium Chloride


  (K-Dur)  40 meq  BID


 ORAL


   4/16/20 18:00


 7/12/20 09:14  4/23/20 09:03


 


 


 Sodium Chloride  1,000 ml @ 


 75 mls/hr  [id number]


 IV


   4/23/20 00:00


 5/23/20 00:00  4/23/20 13:35


 


 


 Tramadol HCl


  (Ultram)  25 mg  Q6H  PRN


 ORAL


 Severe Breakthru Pain (>7)  4/16/20 16:48


 4/23/20 16:47  4/22/20 19:55


 


 


 Vancomycin HCl


  (Firvanq)  125 mg  FOUR TIMES A  DAY


 ORAL


   4/16/20 18:00


 4/26/20 17:59  4/23/20 13:35


 


 


 Vancomycin HCl


  (Vanco rx to


 dose)  1 ea  DAILY  PRN


 MISC


 Per rx protocol  4/21/20 18:15


 5/21/20 18:14   


 


 


 Vancomycin HCl


 750 mg/Sodium


 Chloride  275 ml @ 


 183.333


 mls/hr  Q12HR@0600,1800


 IVPB


   4/22/20 06:00


 4/27/20 05:59  4/23/20 05:54


 











Last 24 Hour Vital Signs








  Date Time  Temp Pulse Resp B/P (MAP) Pulse Ox O2 Delivery O2 Flow Rate FiO2


 


4/23/20 09:03  72  120/66    


 


4/23/20 09:00      Room Air  


 


4/23/20 08:00 98.1 72 20 120/66 (84) 97   


 


4/23/20 04:00 98.3 68 18 119/71 (87) 96   


 


4/22/20 23:58 97.9 73 19 121/67 (85) 98   


 


4/22/20 22:16  77  113/62    


 


4/22/20 21:00      Room Air  


 


4/22/20 20:00 98.1 77 19 113/62 (79) 97   


 


4/22/20 16:00 98.0 90 17 119/79 (92) 98   


 


4/22/20 12:00 97.6 94 18 120/76 (91) 99   


 


4/22/20 11:17  87  121/70    


 


4/22/20 09:00      Room Air  


 


4/22/20 08:00 97.3 87 17 121/70 (87) 98   


 


4/22/20 04:18 98.1 67 18 118/66 (83) 98   


 


4/22/20 00:00 97.3 71 18 121/65 (83) 98   


 


4/21/20 22:19 96.8       


 


4/21/20 22:18      Room Air  


 


4/21/20 20:31  72  132/75    


 


4/21/20 20:00 98.1 71 19 130/73 (92) 98   


 


4/21/20 16:00 96.8 72 18 132/75 (94) 98   

















Intake and Output  


 


 4/22/20 4/23/20





 19:00 07:00


 


Intake Total 855 ml 


 


Balance 855 ml 


 


  


 


Intake Oral 800 ml 


 


IV Total 55 ml 


 


# Voids 3 2


 


# Bowel Movements 3 2











Labs








Test


  4/21/20


17:00 4/22/20


05:10 4/23/20


05:23


 


CA 15-3 Antigen


  13.4 U/mL


(0.0-25.0) 


  


 


 


White Blood Count


  


  6.6 K/UL


(4.8-10.8) 6.0 K/UL


(4.8-10.8)


 


Red Blood Count


  


  3.92 M/UL


(4.20-5.40) 3.88 M/UL


(4.20-5.40)


 


Hemoglobin


  


  12.5 G/DL


(12.0-16.0) 12.2 G/DL


(12.0-16.0)


 


Hematocrit


  


  34.8 %


(37.0-47.0) 34.6 %


(37.0-47.0)


 


Mean Corpuscular Volume  89 FL (80-99)  89 FL (80-99) 


 


Mean Corpuscular Hemoglobin


  


  32.0 PG


(27.0-31.0) 31.4 PG


(27.0-31.0)


 


Mean Corpuscular Hemoglobin


Concent 


  36.0 G/DL


(32.0-36.0) 35.1 G/DL


(32.0-36.0)


 


Red Cell Distribution Width


  


  13.4 %


(11.6-14.8) 13.8 %


(11.6-14.8)


 


Platelet Count


  


  250 K/UL


(150-450) 239 K/UL


(150-450)


 


Mean Platelet Volume


  


  6.9 FL


(6.5-10.1) 6.3 FL


(6.5-10.1)


 


Neutrophils (%) (Auto)


  


   % (45.0-75.0) 


  82.4 %


(45.0-75.0)


 


Lymphocytes (%) (Auto)


  


   % (20.0-45.0) 


  14.5 %


(20.0-45.0)


 


Monocytes (%) (Auto)


  


   % (1.0-10.0) 


  2.8 %


(1.0-10.0)


 


Eosinophils (%) (Auto)


  


   % (0.0-3.0) 


  0.0 %


(0.0-3.0)


 


Basophils (%) (Auto)


  


   % (0.0-2.0) 


  0.3 %


(0.0-2.0)


 


Sodium Level


  


  137 MMOL/L


(136-145) 140 MMOL/L


(136-145)


 


Potassium Level


  


  4.3 MMOL/L


(3.5-5.1) 4.4 MMOL/L


(3.5-5.1)


 


Chloride Level


  


  102 MMOL/L


() 105 MMOL/L


()


 


Carbon Dioxide Level


  


  27 MMOL/L


(21-32) 27 MMOL/L


(21-32)


 


Anion Gap


  


  8 mmol/L


(5-15) 8 mmol/L


(5-15)


 


Blood Urea Nitrogen


  


  22 mg/dL


(7-18) 20 mg/dL


(7-18)


 


Creatinine


  


  0.5 MG/DL


(0.55-1.30) 0.5 MG/DL


(0.55-1.30)


 


Estimat Glomerular Filtration


Rate 


  > 60 mL/min


(>60) > 60 mL/min


(>60)


 


Glucose Level


  


  188 MG/DL


() 169 MG/DL


()


 


Calcium Level


  


  8.8 MG/DL


(8.5-10.1) 8.4 MG/DL


(8.5-10.1)


 


Total Bilirubin


  


  0.3 MG/DL


(0.2-1.0) 


 


 


Aspartate Amino Transf


(AST/SGOT) 


  50 U/L (15-37) 


  


 


 


Alanine Aminotransferase


(ALT/SGPT) 


  75 U/L (12-78) 


  


 


 


Alkaline Phosphatase


  


  139 U/L


() 


 


 


Total Protein


  


  6.4 G/DL


(6.4-8.2) 


 


 


Albumin


  


  2.1 G/DL


(3.4-5.0) 


 


 


Globulin  4.3 g/dL  


 


Albumin/Globulin Ratio  0.5 (1.0-2.7)  


 


Carcinoembryonic Antigen


  


  5.8 ng/mL


(0.0-4.7) 


 


 


Vancomycin Level Trough


  


  


  16.1 ug/mL


(5.0-12.0)








Height (Feet):  5


Height (Inches):  4.00


Weight (Pounds):  131


Objective


Physical Exam


General: Awake and somnolent


HEENT: NC/AT. EOMI. dry mucous membranes


Cardiovascular: RRR.  S1 and S2 normal.


Resp: Normal work of breathing. No cough, wheezing or crackles appreciated


Abdomen: Abdomen is soft, nondistended


Skin: Intact.  No abrasions, laceration or rash over the exposed skin


MSK: Normal tone and bulk.


Neuro: Awake and alert. Lower ext weakness











Dmitry Levin MD Apr 23, 2020 13:43

## 2020-04-23 NOTE — SURGERY PROGRESS NOTE
Surgery Progress Note


Subjective


Additional Comments


Patient seen examined bedside.  Improving.  Eating well.  No complaints.  

Comfortable appearing.  No nausea vomiting fever chills.  Discussed case with 

PCP.  Bone marrow biopsy noted.  Imaging reviewed.  Reviewed hematology and 

neurology.  Can consider further biopsy but would recommend doing it electively 

as an outpatient.  DC planning from surgical standpoint.





Objective





Last 24 Hour Vital Signs








  Date Time  Temp Pulse Resp B/P (MAP) Pulse Ox O2 Delivery O2 Flow Rate FiO2


 


4/23/20 12:00 97.2 74 20 118/65 (82) 95   


 


4/23/20 09:03  72  120/66    


 


4/23/20 09:00      Room Air  


 


4/23/20 08:00 98.1 72 20 120/66 (84) 97   


 


4/23/20 04:00 98.3 68 18 119/71 (87) 96   


 


4/22/20 23:58 97.9 73 19 121/67 (85) 98   


 


4/22/20 22:16  77  113/62    


 


4/22/20 21:00      Room Air  


 


4/22/20 20:00 98.1 77 19 113/62 (79) 97   


 


4/22/20 16:00 98.0 90 17 119/79 (92) 98   








I&O











Intake and Output  


 


 4/22/20 4/23/20





 19:00 07:00


 


Intake Total 855 ml 


 


Balance 855 ml 


 


  


 


Intake Oral 800 ml 


 


IV Total 55 ml 


 


# Voids 3 2


 


# Bowel Movements 3 2








Cardiovascular:  RSR


Respiratory:  clear


Abdomen:  soft, flat, non-tender, present bowel sounds


Extremities:  no tenderness, no cyanosis





Laboratory Tests








Test


  4/23/20


05:23


 


White Blood Count


  6.0 K/UL


(4.8-10.8)


 


Red Blood Count


  3.88 M/UL


(4.20-5.40)  L


 


Hemoglobin


  12.2 G/DL


(12.0-16.0)


 


Hematocrit


  34.6 %


(37.0-47.0)  L


 


Mean Corpuscular Volume 89 FL (80-99)  


 


Mean Corpuscular Hemoglobin


  31.4 PG


(27.0-31.0)  H


 


Mean Corpuscular Hemoglobin


Concent 35.1 G/DL


(32.0-36.0)


 


Red Cell Distribution Width


  13.8 %


(11.6-14.8)


 


Platelet Count


  239 K/UL


(150-450)


 


Mean Platelet Volume


  6.3 FL


(6.5-10.1)  L


 


Neutrophils (%) (Auto)


  82.4 %


(45.0-75.0)  H


 


Lymphocytes (%) (Auto)


  14.5 %


(20.0-45.0)  L


 


Monocytes (%) (Auto)


  2.8 %


(1.0-10.0)


 


Eosinophils (%) (Auto)


  0.0 %


(0.0-3.0)


 


Basophils (%) (Auto)


  0.3 %


(0.0-2.0)


 


Sodium Level


  140 MMOL/L


(136-145)


 


Potassium Level


  4.4 MMOL/L


(3.5-5.1)


 


Chloride Level


  105 MMOL/L


()


 


Carbon Dioxide Level


  27 MMOL/L


(21-32)


 


Anion Gap


  8 mmol/L


(5-15)


 


Blood Urea Nitrogen


  20 mg/dL


(7-18)  H


 


Creatinine


  0.5 MG/DL


(0.55-1.30)  L


 


Estimat Glomerular Filtration


Rate > 60 mL/min


(>60)


 


Glucose Level


  169 MG/DL


()  H


 


Calcium Level


  8.4 MG/DL


(8.5-10.1)  L


 


Vancomycin Level Trough


  16.1 ug/mL


(5.0-12.0)  H











Plan


Problems:  


(1) Decubitus skin ulcer


Assessment & Plan:  Patient noted to have rapid onset Sacral DTPI. Per Primary 

Nurse pt noted on prior shift to have non-blanching erythema as per report.  

All preventive measures were taken to reposition patient and off-load pressure.

  Despite repositioning and applying Moisture Barrier Paste patient noted to 

have further skin decline. 


Sacral DTPI is irregular shaped, Purple and indurated in colour over 

sacrococcygeal with surrounding non-blanching erythema along borders.(L)7.1cm x 

(W)6.5cm. 


Nutritional intake varies 30-45% daily. 


Non-Blanching erythema without fluctuance noted to R and L heels.


Both heels observed floated with pillows Off mattress.





Tx.Plan: 


Apply Moisture Barrier Paste to Sacrum. Cover with Optifoam drsg. Change every 

3 days and prn.


            


Apply Cavilon Skin Barrier to both heels. Cover each heel with Optifoam drsg. 

Change every 7 days and prn.


            


Reposition at least every 2hours or as tolerated.


            


Off-load heels with pillow.


            


APM/VIK Mattress overlay.





MRI spine noted


neuro input appreciated 





(2) Deep tissue injury


(3) Malnutrition


Assessment & Plan:  DAILY ESTIMATED NEEDS:


Needs based on DM 61.5kg  


25-30  kcals/kg 


7298-1357  total kcals


1.25-1.5  g protein/kg


77-92  g total protein 


25-30  mL/kg


5854-6594  total fluid mLs





NUTRITION DIAGNOSIS:


Altered nutrition related lab values r/t DKA, clinical status as evidenced


by A1C 10.4,  on adm, (+) acetone, now w/ critically elev Na(161*)


and low K(2.4*).





CURRENT DIET: CCHO LOW puree + NTL     





PO DIET RECOMMENDATIONS:


CCHO LOW diet/ texture per SLP  





ADDITIONAL RECOMMENDATIONS:


1) W/ variable po intake add Glucerna 1 tetra per day


    Add high pro/1 carb snacks in b/w meals   


2) On D5 for hydration, monitor BG  


3) Wound care: Add JONATHAN BID + Vit C 250mg daily (f/up w/ WC eval) 


4) Obtain a calibrated bed scale wts 


    EMR wt: 110#      vs        Bed scale wt: 135# 





(4) Abnormal thyroid blood test


(5) Electrolyte imbalance


Assessment & Plan:  1.  No pulmonary embolus, somewhat limited at the bases due 

to motion.


2.  Bibasilar lung atelectasis/airspace disease.


3.  3 mm nodule along the right middle lobe fissure. 3.6 mm nodule left 


lower lobe along the fissure.  Fleischner Society Guidelines for low-risk 


patients, no follow-up is necessary.  For high-risk patients (smoking 


history or other known risk factors) an optional chest CT at 12 months 


could be performed.


4.  Mild bilateral perinephric stranding.  May be senescent, correlate 


for infection.


5.  Distended gallbladder with hyperdensity may be sludge or stones.


6.  Prominent left lobe of the liver is slightly nodular in appearance.





US ordered 





(6) Subdural hematoma


Assessment & Plan:  as per neurology


MRI noted


bm biopsy pending 














Tomas Adair Apr 23, 2020 15:27

## 2020-04-23 NOTE — GENERAL PROGRESS NOTE
Assessment/Plan


Assessment/Plan:


65-year-old female with PMH of medical noncompliance, DM type II who presents 

with b/l weakness, N/V, GLF, on admission pt found to be in DKA with 's.





#DKA - resolved


#Uncontrolled DM, Hgb A1C 10.4


#E. coli UTI


#E. coli bacteremia


#c. diff colitis


-continue in-patient medical care


-s/p DKA protocol with improved AG, AG 21-> 14


-4/10 CT abd w/o contrast w/no abnormalities


-4/10: UCx e. coli


-4/10: BCx w/E. coli 2/2, repeat 4/12 BCx NGTD


-c.diff positive, contact precautions


-cont. accuchecks, ISS qAC/HS


-Endo following: Levemir 30 units qhs, Novolog to 15 units ac tid


-ID, Dr. Looney: CTX, s/p #10/10 of abx, PO vanco for c diff day #10. 

States pt ok to d/c to rehab w/CTX x3 days and PO vanc x10 more days





#Multilevel Thoracic BM enhancements


#Meningeal Enhancements on Brain MRI


-suspicious for malignancy vs infectious process


-4/10: CT abd/pel w/o contrast reviewed with no abnormalities


-4/17 CT C/A/P w/w/o without any lesions suspicious for primary malignancy


-CEA elevated, non-


- wnl, BM biopsy w/increase in Nk population, may be reactive v clonal 

process (nonspecific)


-4/21: EGD negative


-Breast CA markers pending


-bone scan negative


-4/23: d/w Heme/Onc: BM biopsy normocellular, cytogenetics normal, flow 

cytometry normal, bone scan normal, currently no evidence of malignancy at this 

time


-4/23: d/w radiology, reviewed images, thoracic enhancements minimal, given 

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 

reconversion


-d/w pt and pt's sister, given negative malignant work-up and radiographic 

findings, will defer thoracic biopsy for outpatient if symptoms arise


-pt will need colonoscopy as o/p once c diff resolves


-updated pt and pt's sister, Carin Rudy Wheeler [phone]


-pt instructed to f/u w/PCP, GI





#Midbrain Lacunar Infarct


#SDH 3mm no midline shift


#Right sided weakness and 


#Generalized B/L LE Weakness 2/2 above


-pt AOx3, pt noted fall 3 days PTA


-4/14: CT head SDH stable compared to previous


-d/w Neuro, no need for transfer at this time, cont. keppra


-MRI brain w/wo contrast reviewed


-cont. fall precautions


-holding ASA given SDH


-cont. statin


-cont. PT treat and eval


-CM for d/c planning, plan for CRI on d/c





#Elevated d-dimer


#Tachycardia - resolved


-likely multifactorial given infection/DKA, PE has been ruled out


-EKG w/NSR


-CTA thorax negative for PE


-tachycardia likely 2/2 dehydration, encourage PO intake


-no OAC at this time





#Hypernatremia - resolved


#Hypokalemia - improved


#Hypophosphatemia


-Hypernatremia likely 2/2 free water deficit


-replace electrolytes, cont. to monitor and replace PRN


-Nephro following, IVF and electrolytes per nephro





#Sacral Decubitus Ulcer


-Wound care following, recs appreciated





DVT - SCDs given SDH





Time spent: 35 minutes on this patient's case, 20 mins spent on d/w pt, 

consulting physicians, and RN on POC.





Time of note may not reflect time of encounter.





Subjective


Allergies:  


Coded Allergies:  


     No Known Allergies (Unverified , 4/15/15)


Subjective


F/u DKA, UTI, hypernatremia, SDH, MRI enhancements on thoracic spine concerning 

for malignancy, MRI brain w/midbrain lacunar infarct.


No acute events overnight.  Patient states UE strength is slowly improving, LE 

remains weak.  Patient denies any bowel/urinary incontinence.





Objective





Last 24 Hour Vital Signs








  Date Time  Temp Pulse Resp B/P (MAP) Pulse Ox O2 Delivery O2 Flow Rate FiO2


 


4/23/20 04:00 98.3 68 18 119/71 (87) 96   


 


4/22/20 23:58 97.9 73 19 121/67 (85) 98   


 


4/22/20 22:16  77  113/62    


 


4/22/20 21:00      Room Air  


 


4/22/20 20:00 98.1 77 19 113/62 (79) 97   


 


4/22/20 16:00 98.0 90 17 119/79 (92) 98   


 


4/22/20 12:00 97.6 94 18 120/76 (91) 99   


 


4/22/20 11:17  87  121/70    


 


4/22/20 09:00      Room Air  

















Intake and Output  


 


 4/22/20 4/23/20





 19:00 07:00


 


Intake Total 855 ml 


 


Balance 855 ml 


 


  


 


Intake Oral 800 ml 


 


IV Total 55 ml 


 


# Voids 3 2


 


# Bowel Movements 3 2








Laboratory Tests


4/23/20 05:23: 


White Blood Count 6.0, Red Blood Count 3.88L, Hemoglobin 12.2, Hematocrit 34.6L

, Mean Corpuscular Volume 89, Mean Corpuscular Hemoglobin 31.4H, Mean 

Corpuscular Hemoglobin Concent 35.1, Red Cell Distribution Width 13.8, Platelet 

Count 239, Mean Platelet Volume 6.3L, Neutrophils (%) (Auto) 82.4H, Lymphocytes 

(%) (Auto) 14.5L, Monocytes (%) (Auto) 2.8, Eosinophils (%) (Auto) 0.0, 

Basophils (%) (Auto) 0.3, Sodium Level 140, Potassium Level 4.4, Chloride Level 

105, Carbon Dioxide Level 27, Anion Gap 8, Blood Urea Nitrogen 20H, Creatinine 

0.5L, Estimat Glomerular Filtration Rate > 60, Glucose Level 169H, Calcium 

Level 8.4L, Vancomycin Level Trough 16.1H


Height (Feet):  5


Height (Inches):  4.00


Weight (Pounds):  131


Objective


General: NAD, laying in bed comfortably, AAO x3


HEENT: NCAT, EOMi, mucous membranes moist


CV: RRR, no murmurs, rubs, or gallops


Pulm: CTAB, No wheezes, rhonchi, or rales, no accessory muscle usage


GI: Soft, nontender, nondistended, bowel sounds present


Neuro: CN II-XII grossly intact, sensation intact bilaterally, MS 3/5 in UE, 0/

5 LE B/L, able to wiggle toes


Ext: no LE edema











Anitha Ochoa M.D. Apr 23, 2020 08:52

## 2020-04-24 NOTE — NUR
*-* INSURANCE *-*



UPDATED CLINICALS AND DISCHARGE SUMMARY HAVE BEEN FAXED TO:



[facility]: ROBERT WAITE

T: [phone] (LEAVE A MESSAGE)

F: [phone]

## 2020-04-26 NOTE — CODER PHYSICIAN QUERY
Clarification is required for compliance, coding accuracy, and to reflect 
severity of illness for this patient.



Dear Dr. MARQUEZ                    Date:04/26/20   : BRITTANY Boogie







SEPSIS QUERY -





04/22 Infectious Disease progress note- e.coli uti/pyelonephritis with 
bacteremia, sepsis, fevers, gram neg sepsis



Discharge summary: Patient was noted to have E. coli UTI, E. coli bacteremia 
and C. difficile colitis which was treated by antibiotics per ID.



--------------------------------------------------------------------------------
----------------

 

A posssible diagnois of SEPSIS was made in the medical record on 04/22/20 prog 
note & consultation 04/12 by Dr. Looney. Upon review, it is difficult to 
determine whether this diagnosis has been ruled in, ruled out,or is still being 
worked up. 



Please clarify if SEPSIS WAS:



[] Ruled out



[] Currently under treatment



[] Still being worked-up



[ ] Unable to determine





If Sepsis under treatment or being worked up,  was Sepsis  Present on Admission
: 

 []  Yes          []  No         []  Clinically Undetermined





_________________                                    _____________

Anitha Marquez M.D.                                   Date



MTDD

## 2021-07-22 NOTE — NUR
CASE MANAGEMENT:REIVEW



SI;   SEPSIS D/T E COLI BACTEREMIA AND UTI. DKA

          HYPERNATREMIA. HYPOKALEMIA. SUBDURAL HEMATOMA

          98.7   109   20   101/75   95% ON RA

 PLT 82    NA+ 146      CR 0.4      CA 8.1



IS:           IV ROCEPHIN Q24

DECADRON IV Q6 HRS

          IVF@100 ML/HR

          LEVEMIR SQ QHS

          VIT C PO BID

          INSULIN SQ TID

          PROTONIX PO BID

          K-DUR PO BID

          KEPPRA PO Q12

          VANCOMYCIN PO QID

          LOPRESSOR PO Q12



****TRANSFERRED TO MED SURG ON 4/16/2020 @ 3172*****

****MED SURG STATUS****

DCP: FROM HOME



PLAN:

BONE MARROW ASPIRATION & BIOPSY

[facility]....DISCHARGE ORDER PENDING [FreeTextEntry1] : Comprehensive annual examination [de-identified] : Feels well.  Yesterday she had a trip and fall with contusions of her right arm and right buttock.  She feels better today.  She has had no prior falls or loss of balance.  She was wearing sneakers and they stuck on a highly polished floor.\par \par She has been living in an apartment after selling her home several years ago.  She will be moving into the [facility].  Her agent was Jennifer

## 2023-07-19 NOTE — NUR
NURSE NOTES:

Patient off floor for MRI. Complex Repair And O-L Flap Text: The defect edges were debeveled with a #15 scalpel blade.  The primary defect was closed partially with a complex linear closure.  Given the location of the remaining defect, shape of the defect and the proximity to free margins an O-L flap was deemed most appropriate for complete closure of the defect.  Using a sterile surgical marker, an appropriate flap was drawn incorporating the defect and placing the expected incisions within the relaxed skin tension lines where possible.    The area thus outlined was incised deep to adipose tissue with a #15 scalpel blade.  The skin margins were undermined to an appropriate distance in all directions utilizing iris scissors.

## 2023-09-30 NOTE — NUR
Report received from PEYTON Pillai. Pt transferred to room 210-1. Pt positive for CDIFF. Pt 
cont. flaccid extremities X4 and neck. 1.72

## 2024-02-15 NOTE — NUR
HAND-OFF: 

Report given to Barbara REED. Pt remains stable. Mother was provided information:    The intake is an appointment focused on understanding your needs and creating a plan for your care. The clinician who completes the intake may or may not be your assigned therapist, depending on your clinical needs and scheduling preferences. The intake clinician will provide additional information about next steps once they are able to meet with you.     Psychiatry is not available currently at [facility] however patients are able to look elsewhere for psych services or contact their insurance company.